# Patient Record
Sex: FEMALE | Race: WHITE | Employment: OTHER | URBAN - METROPOLITAN AREA
[De-identification: names, ages, dates, MRNs, and addresses within clinical notes are randomized per-mention and may not be internally consistent; named-entity substitution may affect disease eponyms.]

---

## 2017-03-14 ENCOUNTER — HOSPITAL ENCOUNTER (OUTPATIENT)
Dept: MRI IMAGING | Age: 67
Discharge: HOME | End: 2017-03-14
Attending: PODIATRIST
Payer: COMMERCIAL

## 2017-03-14 DIAGNOSIS — Z87.39 HISTORY OF OSTEOMYELITIS: ICD-10-CM

## 2017-03-14 LAB — CREAT BLD-MCNC: 0.6 MG/DL (ref 0.6–1)

## 2017-03-14 PROCEDURE — 74011250636 HC RX REV CODE- 250/636: Performed by: PODIATRIST

## 2017-03-14 PROCEDURE — 82565 ASSAY OF CREATININE: CPT

## 2017-03-14 PROCEDURE — A9577 INJ MULTIHANCE: HCPCS | Performed by: PODIATRIST

## 2017-03-14 PROCEDURE — 73720 MRI LWR EXTREMITY W/O&W/DYE: CPT

## 2017-03-14 RX ORDER — SODIUM CHLORIDE 0.9 % (FLUSH) 0.9 %
10 SYRINGE (ML) INJECTION
Status: ACTIVE | OUTPATIENT
Start: 2017-03-14 | End: 2017-03-15

## 2017-03-14 RX ADMIN — GADOBENATE DIMEGLUMINE 9 ML: 529 INJECTION, SOLUTION INTRAVENOUS at 16:12

## 2017-03-17 ENCOUNTER — PATIENT OUTREACH (OUTPATIENT)
Dept: OTHER | Age: 67
End: 2017-03-17

## 2017-03-17 NOTE — PROGRESS NOTES
Verified  and address for HIPAA security. Discussed [address] Management services with patient. Patient does not identify any care management needs at this time and declines services. Patient was notified that should she identify any needs in the future, to please contact us as we would be happy to work with her.

## 2017-06-21 ENCOUNTER — HOSPITAL ENCOUNTER (OUTPATIENT)
Dept: SURGERY | Age: 67
Discharge: HOME | End: 2017-06-21
Attending: OBSTETRICS & GYNECOLOGY
Payer: COMMERCIAL

## 2017-06-21 VITALS
DIASTOLIC BLOOD PRESSURE: 99 MMHG | BODY MASS INDEX: 35.73 KG/M2 | TEMPERATURE: 97.4 F | WEIGHT: 194.13 LBS | RESPIRATION RATE: 16 BRPM | HEART RATE: 64 BPM | SYSTOLIC BLOOD PRESSURE: 187 MMHG | OXYGEN SATURATION: 95 % | HEIGHT: 62 IN

## 2017-06-21 LAB
GLUCOSE BLD STRIP.AUTO-MCNC: 113 MG/DL (ref 65–100)
GLUCOSE SERPL-MCNC: 106 MG/DL (ref 65–100)
POTASSIUM SERPL-SCNC: 3.7 MMOL/L (ref 3.5–5.1)

## 2017-06-21 PROCEDURE — 84132 ASSAY OF SERUM POTASSIUM: CPT | Performed by: ANESTHESIOLOGY

## 2017-06-21 PROCEDURE — 82947 ASSAY GLUCOSE BLOOD QUANT: CPT | Performed by: ANESTHESIOLOGY

## 2017-06-21 PROCEDURE — 82962 GLUCOSE BLOOD TEST: CPT | Performed by: ANESTHESIOLOGY

## 2017-06-21 RX ORDER — THERA TABS 400 MCG
1 TAB ORAL DAILY
COMMUNITY
End: 2019-12-10 | Stop reason: HOSPADM

## 2017-06-21 RX ORDER — FUROSEMIDE 20 MG/1
20 TABLET ORAL DAILY
COMMUNITY
Start: 2016-03-30 | End: 2017-06-21

## 2017-06-21 RX ORDER — CITALOPRAM 20 MG/1
20 TABLET, FILM COATED ORAL
COMMUNITY
Start: 2016-04-20 | End: 2017-10-30 | Stop reason: SDUPTHER

## 2017-06-21 NOTE — PERIOP NOTES
Recent Results (from the past 8 hour(s))   POTASSIUM    Collection Time: 06/21/17  9:55 AM   Result Value Ref Range    Potassium 3.7 3.5 - 5.1 mmol/L   GLUCOSE, RANDOM    Collection Time: 06/21/17  9:55 AM   Result Value Ref Range    Glucose 106 (H) 65 - 100 mg/dL   GLUCOSE, POC    Collection Time: 06/21/17  9:57 AM   Result Value Ref Range    Glucose (POC) 113 (H) 65 - 100 mg/dL

## 2017-06-21 NOTE — PERIOP NOTES
Patient verified name, , and surgery as listed in [facility]. Type 1B surgery, walk in assessment complete. Labs per surgeon: none;   Labs per anesthesia protocol: potassium; results:  3.7; SQBS:  Results 113~within anesthesia guidelines; printed/placed on chart; routed to PCP, Dr. Chas Ceballos and to surgeon, Dr. Tavon Cast for further review. EKG: none needed per protocol    Hibiclens and instructions given per hospital policy. Patient provided with handouts including Guide to Surgery, Pain Management, Hand Hygiene, Blood Transfusion Education, and [place] [facility] Brochure. Patient answered medical/surgical history questions at their best of ability. All prior to admission medications documented in [facility]. Original medication prescription bottle NOT visualized during patient appointment. Patient instructed to hold all vitamins 7 days prior to surgery and NSAIDS 5 days prior to surgery, patient verbalized understanding. Medications to be held none    Patient instructed to continue previous medications as prescribed prior to surgery and to take the following medications the day of surgery according to anesthesia guidelines with a small sip of water: none. Patient taught back and verbalized understanding.

## 2017-06-22 ENCOUNTER — ANESTHESIA EVENT (OUTPATIENT)
Dept: SURGERY | Age: 67
End: 2017-06-22
Payer: COMMERCIAL

## 2017-06-22 RX ORDER — CELECOXIB 200 MG/1
200 CAPSULE ORAL
Status: CANCELLED | OUTPATIENT
Start: 2017-06-22

## 2017-06-22 RX ORDER — FENTANYL CITRATE 50 UG/ML
100 INJECTION, SOLUTION INTRAMUSCULAR; INTRAVENOUS ONCE
Status: CANCELLED | OUTPATIENT
Start: 2017-06-22 | End: 2017-06-22

## 2017-06-23 ENCOUNTER — HOSPITAL ENCOUNTER (OUTPATIENT)
Age: 67
Setting detail: OUTPATIENT SURGERY
Discharge: HOME | End: 2017-06-23
Attending: OBSTETRICS & GYNECOLOGY | Admitting: OBSTETRICS & GYNECOLOGY
Payer: COMMERCIAL

## 2017-06-23 ENCOUNTER — ANESTHESIA (OUTPATIENT)
Dept: SURGERY | Age: 67
End: 2017-06-23
Payer: COMMERCIAL

## 2017-06-23 VITALS
HEART RATE: 57 BPM | BODY MASS INDEX: 35.7 KG/M2 | RESPIRATION RATE: 15 BRPM | SYSTOLIC BLOOD PRESSURE: 124 MMHG | HEIGHT: 62 IN | OXYGEN SATURATION: 94 % | DIASTOLIC BLOOD PRESSURE: 57 MMHG | WEIGHT: 194 LBS | TEMPERATURE: 98 F

## 2017-06-23 LAB — GLUCOSE BLD STRIP.AUTO-MCNC: 164 MG/DL (ref 65–100)

## 2017-06-23 PROCEDURE — 74011250636 HC RX REV CODE- 250/636

## 2017-06-23 PROCEDURE — 77030032490 HC SLV COMPR SCD KNE COVD -B: Performed by: OBSTETRICS & GYNECOLOGY

## 2017-06-23 PROCEDURE — 88305 TISSUE EXAM BY PATHOLOGIST: CPT | Performed by: OBSTETRICS & GYNECOLOGY

## 2017-06-23 PROCEDURE — 76010000138 HC OR TIME 0.5 TO 1 HR: Performed by: OBSTETRICS & GYNECOLOGY

## 2017-06-23 PROCEDURE — 77030020143 HC AIRWY LARYN INTUB CGAS -A: Performed by: ANESTHESIOLOGY

## 2017-06-23 PROCEDURE — 82962 GLUCOSE BLOOD TEST: CPT

## 2017-06-23 PROCEDURE — 74011250636 HC RX REV CODE- 250/636: Performed by: ANESTHESIOLOGY

## 2017-06-23 PROCEDURE — 77030012317 HC CATH URET INT COVD -A: Performed by: OBSTETRICS & GYNECOLOGY

## 2017-06-23 PROCEDURE — 74011000250 HC RX REV CODE- 250: Performed by: ANESTHESIOLOGY

## 2017-06-23 PROCEDURE — 77030020782 HC GWN BAIR PAWS FLX 3M -B: Performed by: ANESTHESIOLOGY

## 2017-06-23 PROCEDURE — 76060000032 HC ANESTHESIA 0.5 TO 1 HR: Performed by: OBSTETRICS & GYNECOLOGY

## 2017-06-23 PROCEDURE — 77030018836 HC SOL IRR NACL ICUM -A: Performed by: OBSTETRICS & GYNECOLOGY

## 2017-06-23 PROCEDURE — 76210000020 HC REC RM PH II FIRST 0.5 HR: Performed by: OBSTETRICS & GYNECOLOGY

## 2017-06-23 PROCEDURE — 74011000250 HC RX REV CODE- 250

## 2017-06-23 PROCEDURE — 76210000016 HC OR PH I REC 1 TO 1.5 HR: Performed by: OBSTETRICS & GYNECOLOGY

## 2017-06-23 RX ORDER — HYDROMORPHONE HYDROCHLORIDE 2 MG/ML
0.5 INJECTION, SOLUTION INTRAMUSCULAR; INTRAVENOUS; SUBCUTANEOUS
Status: DISCONTINUED | OUTPATIENT
Start: 2017-06-23 | End: 2017-06-23 | Stop reason: HOSPADM

## 2017-06-23 RX ORDER — FENTANYL CITRATE 50 UG/ML
INJECTION, SOLUTION INTRAMUSCULAR; INTRAVENOUS AS NEEDED
Status: DISCONTINUED | OUTPATIENT
Start: 2017-06-23 | End: 2017-06-23 | Stop reason: HOSPADM

## 2017-06-23 RX ORDER — SODIUM CHLORIDE 0.9 % (FLUSH) 0.9 %
5-10 SYRINGE (ML) INJECTION AS NEEDED
Status: DISCONTINUED | OUTPATIENT
Start: 2017-06-23 | End: 2017-06-23 | Stop reason: HOSPADM

## 2017-06-23 RX ORDER — ONDANSETRON 2 MG/ML
INJECTION INTRAMUSCULAR; INTRAVENOUS AS NEEDED
Status: DISCONTINUED | OUTPATIENT
Start: 2017-06-23 | End: 2017-06-23 | Stop reason: HOSPADM

## 2017-06-23 RX ORDER — SODIUM CHLORIDE, SODIUM LACTATE, POTASSIUM CHLORIDE, CALCIUM CHLORIDE 600; 310; 30; 20 MG/100ML; MG/100ML; MG/100ML; MG/100ML
75 INJECTION, SOLUTION INTRAVENOUS CONTINUOUS
Status: DISCONTINUED | OUTPATIENT
Start: 2017-06-23 | End: 2017-06-23 | Stop reason: HOSPADM

## 2017-06-23 RX ORDER — LIDOCAINE HYDROCHLORIDE 10 MG/ML
0.1 INJECTION INFILTRATION; PERINEURAL AS NEEDED
Status: DISCONTINUED | OUTPATIENT
Start: 2017-06-23 | End: 2017-06-23 | Stop reason: HOSPADM

## 2017-06-23 RX ORDER — SODIUM CHLORIDE, SODIUM LACTATE, POTASSIUM CHLORIDE, CALCIUM CHLORIDE 600; 310; 30; 20 MG/100ML; MG/100ML; MG/100ML; MG/100ML
50 INJECTION, SOLUTION INTRAVENOUS CONTINUOUS
Status: DISCONTINUED | OUTPATIENT
Start: 2017-06-23 | End: 2017-06-23 | Stop reason: HOSPADM

## 2017-06-23 RX ORDER — KETOROLAC TROMETHAMINE 30 MG/ML
INJECTION, SOLUTION INTRAMUSCULAR; INTRAVENOUS AS NEEDED
Status: DISCONTINUED | OUTPATIENT
Start: 2017-06-23 | End: 2017-06-23 | Stop reason: HOSPADM

## 2017-06-23 RX ORDER — ALBUTEROL SULFATE 0.83 MG/ML
2.5 SOLUTION RESPIRATORY (INHALATION) AS NEEDED
Status: DISCONTINUED | OUTPATIENT
Start: 2017-06-23 | End: 2017-06-23 | Stop reason: HOSPADM

## 2017-06-23 RX ORDER — MIDAZOLAM HYDROCHLORIDE 1 MG/ML
2 INJECTION, SOLUTION INTRAMUSCULAR; INTRAVENOUS ONCE
Status: DISCONTINUED | OUTPATIENT
Start: 2017-06-23 | End: 2017-06-23 | Stop reason: HOSPADM

## 2017-06-23 RX ORDER — PROPOFOL 10 MG/ML
INJECTION, EMULSION INTRAVENOUS AS NEEDED
Status: DISCONTINUED | OUTPATIENT
Start: 2017-06-23 | End: 2017-06-23 | Stop reason: HOSPADM

## 2017-06-23 RX ORDER — MIDAZOLAM HYDROCHLORIDE 1 MG/ML
2 INJECTION, SOLUTION INTRAMUSCULAR; INTRAVENOUS
Status: DISCONTINUED | OUTPATIENT
Start: 2017-06-23 | End: 2017-06-23 | Stop reason: HOSPADM

## 2017-06-23 RX ORDER — DEXAMETHASONE SODIUM PHOSPHATE 4 MG/ML
INJECTION, SOLUTION INTRA-ARTICULAR; INTRALESIONAL; INTRAMUSCULAR; INTRAVENOUS; SOFT TISSUE AS NEEDED
Status: DISCONTINUED | OUTPATIENT
Start: 2017-06-23 | End: 2017-06-23 | Stop reason: HOSPADM

## 2017-06-23 RX ORDER — LIDOCAINE HYDROCHLORIDE 20 MG/ML
INJECTION, SOLUTION EPIDURAL; INFILTRATION; INTRACAUDAL; PERINEURAL AS NEEDED
Status: DISCONTINUED | OUTPATIENT
Start: 2017-06-23 | End: 2017-06-23 | Stop reason: HOSPADM

## 2017-06-23 RX ORDER — SODIUM CHLORIDE 0.9 % (FLUSH) 0.9 %
5-10 SYRINGE (ML) INJECTION EVERY 8 HOURS
Status: DISCONTINUED | OUTPATIENT
Start: 2017-06-23 | End: 2017-06-23 | Stop reason: HOSPADM

## 2017-06-23 RX ORDER — OXYCODONE HYDROCHLORIDE 5 MG/1
5 TABLET ORAL
Status: DISCONTINUED | OUTPATIENT
Start: 2017-06-23 | End: 2017-06-23 | Stop reason: HOSPADM

## 2017-06-23 RX ORDER — TRAMADOL HYDROCHLORIDE 50 MG/1
50 TABLET ORAL
Qty: 20 TAB | Refills: 0 | Status: SHIPPED | OUTPATIENT
Start: 2017-06-23 | End: 2017-12-13

## 2017-06-23 RX ADMIN — DEXAMETHASONE SODIUM PHOSPHATE 10 MG: 4 INJECTION, SOLUTION INTRA-ARTICULAR; INTRALESIONAL; INTRAMUSCULAR; INTRAVENOUS; SOFT TISSUE at 11:06

## 2017-06-23 RX ADMIN — FENTANYL CITRATE 25 MCG: 50 INJECTION, SOLUTION INTRAMUSCULAR; INTRAVENOUS at 11:00

## 2017-06-23 RX ADMIN — SODIUM CHLORIDE, SODIUM LACTATE, POTASSIUM CHLORIDE, AND CALCIUM CHLORIDE: 600; 310; 30; 20 INJECTION, SOLUTION INTRAVENOUS at 10:55

## 2017-06-23 RX ADMIN — FENTANYL CITRATE 50 MCG: 50 INJECTION, SOLUTION INTRAMUSCULAR; INTRAVENOUS at 10:56

## 2017-06-23 RX ADMIN — KETOROLAC TROMETHAMINE 30 MG: 30 INJECTION, SOLUTION INTRAMUSCULAR; INTRAVENOUS at 11:24

## 2017-06-23 RX ADMIN — SODIUM CHLORIDE, SODIUM LACTATE, POTASSIUM CHLORIDE, AND CALCIUM CHLORIDE: 600; 310; 30; 20 INJECTION, SOLUTION INTRAVENOUS at 11:24

## 2017-06-23 RX ADMIN — PROPOFOL 200 MG: 10 INJECTION, EMULSION INTRAVENOUS at 10:56

## 2017-06-23 RX ADMIN — SODIUM CHLORIDE, SODIUM LACTATE, POTASSIUM CHLORIDE, AND CALCIUM CHLORIDE 75 ML/HR: 600; 310; 30; 20 INJECTION, SOLUTION INTRAVENOUS at 10:39

## 2017-06-23 RX ADMIN — LIDOCAINE HYDROCHLORIDE 0.1 ML: 10 INJECTION, SOLUTION INFILTRATION; PERINEURAL at 10:39

## 2017-06-23 RX ADMIN — FENTANYL CITRATE 25 MCG: 50 INJECTION, SOLUTION INTRAMUSCULAR; INTRAVENOUS at 11:10

## 2017-06-23 RX ADMIN — LIDOCAINE HYDROCHLORIDE 100 MG: 20 INJECTION, SOLUTION EPIDURAL; INFILTRATION; INTRACAUDAL; PERINEURAL at 10:56

## 2017-06-23 RX ADMIN — ONDANSETRON 4 MG: 2 INJECTION INTRAMUSCULAR; INTRAVENOUS at 11:06

## 2017-06-23 NOTE — OP NOTES
HYSTEROSCOPY WITH DILATATION AND CURETTAGE    DATE OF PROCEDURE: 6/23/2017     PREOPERATIVE DIAGNOSIS: Post-menopausal bleeding [N95.0]  Thickened endometrium [R93.8]     POSTOPERATWE DIAGNOSIS: Post-menopausal bleeding [N95.0]  Thickened endometrium [R93.8] endometrIA POLYPS    PROCEDURE: Hysteroscopy with dilatation & curettage. SURGEON: Ilia Wilkerson MD    ANESTHESIA: General.    DRAINS: Sterile in and out catheterization of the bladder. FINDINGS: MULTIPLE LARGE POLYPS    PROCEDURE IN DETAIL: The patient was taken to the Operating Room. After adequate anesthesia was established she was properly positioned, scrubbed, prepped and draped. Time out was done to confirm the operating procedure, surgeon, patient and site. Once confirmed by the team, procedure was started. The weighted speculum was placed in the vault to expose the cervix, was grasped at 12 oclock with the single-tooth tenaculum and sounded to 8 cm. It was sequentially dilated prior to the hysteroscope being inserted with the above noted findings. Multiple large polyps excised with forceps. Afterwards a very gentle but homogenous curetting was done starting in the midline and working in a clockwise manner. Endometrial tissue was retrieved. The hysteroscope was reinserted again. With this complete and thorough visual review, we terminated the procedure. With the sponge, instrument and needle count correct, the patient was awakened and taken to the Recovery Room in satisfactory condition. BLOOD LOSS ESTIMATED: Minimal    SPECIMENS:Endometrial curettings AND POLYPS    Pt discharged to home . Precautions given . Appt 2 weeks.  Rx for Tramadol  50 mg (#20)

## 2017-06-23 NOTE — ANESTHESIA POSTPROCEDURE EVALUATION
Post-Anesthesia Evaluation and Assessment    Patient: Kristan Chew MRN: [id number]  SSN: [id number]    YOB: 1950  Age: 78 y.o. Sex: female       Cardiovascular Function/Vital Signs  Visit Vitals    /82    Pulse 60    Temp 36.7 °C (98 °F)    Resp 16    Ht 5' 2\" (1.575 m)    Wt 88 kg (194 lb)    SpO2 93%    BMI 35.48 kg/m2       Patient is status post general anesthesia for Procedure(s):  DILATATION AND CURETTAGE HYSTEROSCOPY, POLYPECTOMY. Nausea/Vomiting: None    Postoperative hydration reviewed and adequate. Pain:  Pain Scale 1: Numeric (0 - 10) (06/23/17 1218)  Pain Intensity 1: 0 (06/23/17 1218)   Managed    Neurological Status:   Neuro (WDL): Exceptions to WDL (06/23/17 1218)  Neuro  Neurologic State: Drowsy (06/23/17 1218)  Orientation Level: Oriented to person;Oriented to place;Oriented to situation (06/23/17 1132)  Cognition: Follows commands (06/23/17 1218)  LUE Motor Response: Purposeful (06/23/17 1218)  LLE Motor Response: Purposeful (06/23/17 1218)  RUE Motor Response: Purposeful (06/23/17 1218)  RLE Motor Response: Purposeful (06/23/17 1218)   At baseline    Mental Status and Level of Consciousness: Arousable    Pulmonary Status:   O2 Device: Room air (06/23/17 1218)   Adequate oxygenation and airway patent    Complications related to anesthesia: None    Post-anesthesia assessment completed.  No concerns    Signed By: Deshawn Garcia MD     June 23, 2017

## 2017-06-23 NOTE — DISCHARGE INSTRUCTIONS
INSTRUCTIONS FOLLOWING HYSTEROSCOPY    ACTIVITY   Limited activity today; increase as tolerated tomorrow, but no vigorous exercise   Shower only; no tub baths   No douches, tampons or intercourse until your doctor releases you (at least 2 weeks)   May return to work or school as directed by your doctor    DIET   Clear liquids until no nausea or vomiting   Advance to regular diet as tolerated    PAIN   Expect a moderate amount of pain.  Take pain medication as directed by your doctor. If no prescription for pain is sent         home with you, take the appropriate dose of your commonly used pain medication.  Call your doctor if pain is NOT relieved by medication.  DO NOT take aspirin or blood thinners until directed by your doctor. FOLLOW-UP PHONE [address] will be made by nursing staff   If you have any problems, call your doctor as needed. CALL YOUR DOCTOR IF   Excessive bleeding that soaks a pad in an hour   Temperature of 101°F or above   Green or yellow, smelly discharge   Unable to urinate by bedtime   Nausea and vomiting that does not stop by bedtime    AFTER ANESTHESIA   For the first 24 hours: DO NOT Drive, Drink alcoholic beverages, or Make important decisions.  Beware of dizziness following anesthesia and while taking pain medication.    Plan to stay tonight within 1 hours drive of the hospital

## 2017-06-23 NOTE — ANESTHESIA PREPROCEDURE EVALUATION
Anesthetic History   No history of anesthetic complications            Review of Systems / Medical History  Patient summary reviewed, nursing notes reviewed and pertinent labs reviewed    Pulmonary  Within defined limits                 Neuro/Psych   Within defined limits           Cardiovascular    Hypertension: well controlled        Dysrhythmias       Exercise tolerance: >4 METS     GI/Hepatic/Renal     GERD: well controlled           Endo/Other    Diabetes: well controlled, type 2    Obesity and arthritis     Other Findings              Physical Exam    Airway  Mallampati: II  TM Distance: 4 - 6 cm  Neck ROM: normal range of motion   Mouth opening: Normal     Cardiovascular    Rhythm: regular           Dental  No notable dental hx       Pulmonary  Breath sounds clear to auscultation               Abdominal         Other Findings            Anesthetic Plan    ASA: 3  Anesthesia type: general          Induction: Intravenous  Anesthetic plan and risks discussed with: Patient

## 2017-06-23 NOTE — H&P
Subjective:     Patient is a 78 y.o.  female presents with postmenopausal bleeding. gradually worsening course. Patient Active Problem List    Diagnosis Date Noted    Cellulitis in diabetic foot ([facility] 2011    Severe sepsis ([facility] 2011    Diabetes ([facility] 2011    Hypotension, unspecified 2011    HTN (hypertension) 2011    Depression 2011     Past Medical History:   Diagnosis Date    Arrhythmia     episode A. Fib with tachycardia 2014 (on a cruise) Converted back to normal sinus rhythm with Amiodarone    Arthritis     in hands    Cellulitis     history of cellulitis right foot, leg (hosp. 11) and 2nd episode in 2014 (not hospitalized);turned into ulcer on toe     Depression     Diabetes ([facility] dx     type 2, oral med, avg fbs 130-170, notices s/s hypoglycemia at 60, unsure last HA1C    Diverticulosis of colon (without mention of hemorrhage)     Edema extremities     chronic (treated with daily Furosemide)    Foot ulcer (HCC)     GERD (gastroesophageal reflux disease)     occ. episode with tomato-type products, relieved with rolaids or tums    Hammer toe     Hypercholesterolemia     no medication    Hypertension     controlled with meds    Neuropathy     in bilateral feet; no medication    Obese 14    BMI 39.1    PUD (peptic ulcer disease)     Thromboembolus ([facility]     right leg      Past Surgical History:   Procedure Laterality Date    ABDOMEN SURGERY PROC UNLISTED  2011    drainage abdominal wall abscess    HX APPENDECTOMY      HX CARPAL TUNNEL RELEASE Right     HX  SECTION      HX LAP CHOLECYSTECTOMY      HX OOPHORECTOMY  early 's    Rt ovary removed.  Left ovary intact    HX ORTHOPAEDIC      left foot~pt denies    HX OTHER SURGICAL      abscess drained~pt states no    HX TUBAL LIGATION        [unfilled]  Allergies   Allergen Reactions    Amoxicillin Rash and Other (comments)     Made her feel \"weird\"  Doxycycline Nausea and Vomiting      Social History   Substance Use Topics    Smoking status: Never Smoker    Smokeless tobacco: Never Used    Alcohol use No      Family History   Problem Relation Age of Onset    Colon Cancer Mother     Cancer Father      lung (smoker)    Hypertension Brother     Heart Disease Brother     Heart Attack Brother     Stroke Brother     Cancer Other      mother - colon      Review of Systems  A comprehensive review of systems was negative except for that written in the HPI. Objective:     Patient Vitals for the past 8 hrs:   BP Temp Pulse Resp SpO2 Height Weight   06/23/17 1012 184/85 - - - - - -   06/23/17 1008 - 97.6 °F (36.4 °C) 68 18 95 % 5' 2\" (1.575 m) 194 lb (88 kg)     No intake or output data in the 24 hours ending 06/23/17 1040      General: Alert . Oriented x3   HEENT: Normocephalic PEERL   Heart: RRR   Lungs: Clear   Abdominal: Bowel sounds are normal, liver is not enlarged, spleen is not enlarged   Neurological: Alert and oriented X 3, normal strength and tone. Normal symmetric reflexes.  Normal coordination and gait   Extremities: extremities normal, atraumatic, no cyanosis or edema     Assessment:     Postmenopausal bleeding    Plan:       Procedure(s):  DILATATION AND CURETTAGE HYSTEROSCOPY

## 2017-06-23 NOTE — IP AVS SNAPSHOT
[place] [facility] 
 
 
 [address] 
[phone] Patient: Huang Lake MRN: [id number] :1950 You are allergic to the following Allergen Reactions Amoxicillin Rash Other (comments) Made her feel \"weird\" Doxycycline Nausea and Vomiting Recent Documentation Height Weight BMI OB Status Smoking Status 1.575 m 88 kg 35.48 kg/m2 Menopause Never Smoker Emergency Contacts Name Discharge Info Relation Home Work Mobile Richard Meehan DISCHARGE CAREGIVER [3] Spouse [3] [phone] About your hospitalization You were admitted on:  2017 You last received care in the:  [facility] PACU You were discharged on:  2017 Unit phone number:  [phone] Why you were hospitalized Your primary diagnosis was:  Not on File Providers Seen During Your Hospitalizations Provider Role Specialty Primary office phone Zen Lipscomb MD Attending Provider Obstetrics & Gynecology [phone] Your Primary Care Physician (PCP) Primary Care Physician Office Phone Office Fax [facility] [address] [facility] [phone] Follow-up Information Follow up With Details Comments Contact Info Zen Lipscomb MD  Please call for follow-up appointment. [address] [employer] [address] [place] [address] 
[phone] Mehrdad Salcedo MD   [address] [place] [address] 
[phone] Your Appointments 2017  2:30 PM EDT  
POST OP with Zen Lipscomb MD  
[facility] ([address] [facility]) [facility] [address] [phone] Current Discharge Medication List  
  
START taking these medications Dose & Instructions Dispensing Information Comments Morning Noon Evening Bedtime traMADol 50 mg tablet Commonly known as:  ULTRAM  
   
Your last dose was: Your next dose is:    
   
   
 Dose:  50 mg Take 1 Tab by mouth every six (6) hours as needed for Pain. Max Daily Amount: 200 mg. Quantity:  20 Tab Refills:  0 CONTINUE these medications which have NOT CHANGED Dose & Instructions Dispensing Information Comments Morning Noon Evening Bedtime  
 b complex vitamins tablet Your last dose was: Your next dose is:    
   
   
 Dose:  1 Tab Take 1 Tab by mouth daily. Refills:  0  
     
   
   
   
  
 benazepril 20 mg tablet Commonly known as:  LOTENSIN Your last dose was: Your next dose is:    
   
   
 Dose:  20 mg Take 1 Tab by mouth daily. Quantity:  90 Tab Refills:  3 cholecalciferol (VITAMIN D3) 5,000 unit Tab tablet Commonly known as:  VITAMIN D3 Your last dose was: Your next dose is:    
   
   
 Dose:  5000 Units Take 5,000 Units by mouth every morning. Refills:  0  
     
   
   
   
  
 citalopram 20 mg tablet Commonly known as:  Uyen Garcia Your last dose was: Your next dose is:    
   
   
 Dose:  20 mg Take 20 mg by mouth nightly. Takes 1/2 tablet Refills:  0  
     
   
   
   
  
 CO Q-10 10 mg Cap Generic drug:  coenzyme q10 Your last dose was: Your next dose is: Take  by mouth daily. WITH CINNAMIN Refills:  0  
     
   
   
   
  
 furosemide 20 mg tablet Commonly known as:  LASIX Your last dose was: Your next dose is:    
   
   
 Dose:  20 mg Take 1 Tab by mouth daily. Quantity:  90 Tab Refills:  3  
     
   
   
   
  
 glucose blood VI test strips strip Commonly known as:  CONTOUR NEXT STRIPS Your last dose was: Your next dose is:    
   
   
 Test blood sugars once daily Quantity:  50 Strip Refills:  11  
     
   
   
   
  
 glyBURIDE-metFORMIN 5-500 mg per tablet Commonly known as:  Kd Mccarthy Your last dose was: Your next dose is: TAKE 1 TABLET ORAL TWO TIMES DAILY Quantity:  180 Tab Refills:  3 OMEGA 3-6-9 COMPLEX PO Your last dose was: Your next dose is: Take  by mouth daily. Refills:  0 PROBIOTIC 4X 10-15 mg Tbec Generic drug:  B.infantis-B.ani-B.long-B.bifi Your last dose was: Your next dose is: Take  by mouth daily. Refills:  0  
     
   
   
   
  
 therapeutic multivitamin tablet Commonly known as:  [facility] Your last dose was: Your next dose is:    
   
   
 Dose:  1 Tab Take 1 Tab by mouth daily. Refills:  0  
     
   
   
   
  
 VITAMIN C PO Your last dose was: Your next dose is:    
   
   
 Dose:  1 Tab Take 1 Tab by mouth every morning. Refills:  0 Where to Get Your Medications Information on where to get these meds will be given to you by the nurse or doctor. ! Ask your nurse or doctor about these medications  
  traMADol 50 mg tablet Discharge Instructions INSTRUCTIONS FOLLOWING HYSTEROSCOPY 
 
ACTIVITY  Limited activity today; increase as tolerated tomorrow, but no vigorous exercise  Shower only; no tub baths  No douches, tampons or intercourse until your doctor releases you (at least 2 weeks)  May return to work or school as directed by your doctor DIET  Clear liquids until no nausea or vomiting  Advance to regular diet as tolerated PAIN 
 Expect a moderate amount of pain.  Take pain medication as directed by your doctor. If no prescription for pain is sent     
   home with you, take the appropriate dose of your commonly used pain medication.  Call your doctor if pain is NOT relieved by medication.  DO NOT take aspirin or blood thinners until directed by your doctor. FOLLOW-UP PHONE CALLS  Calls will be made by nursing staff  If you have any problems, call your doctor as needed. CALL YOUR DOCTOR IF 
 Excessive bleeding that soaks a pad in an hour  Temperature of 101°F or above  Green or yellow, smelly discharge  Unable to urinate by bedtime  Nausea and vomiting that does not stop by bedtime AFTER ANESTHESIA  For the first 24 hours: DO NOT Drive, Drink alcoholic beverages, or Make important decisions.  Beware of dizziness following anesthesia and while taking pain medication.  Plan to stay tonight within 1 hours drive of the hospital 
 
 
 
 
Discharge Orders None ACO Transitions of Care Introducing [employer] [address]qui Mayen offers a voluntary care coordination program to provide high quality service and care to [facility] fee-for-service beneficiaries. Renée Rodriguez was designed to help you enhance your health and well-being through the following services: ? Transitions of Care  support for individuals who are transitioning from one care setting to another (example: Hospital to home). ? Chronic and Complex Care Coordination  support for individuals and caregivers of those with serious or chronic illnesses or with more than one chronic (ongoing) condition and those who take a number of different medications. If you meet specific medical criteria, a [facility] [address] may call you directly to coordinate your care with your primary care physician and your other care providers. For questions about the [facility] programs, please, contact your physicians office. For general questions or additional information about Accountable Care Organizations: 
Please visit www.medicare.gov/acos. html or call 1-800-MEDICARE (7-716.922.3456) TTY users should call 6-258.365.1705. Introducing [facility]! Jennifer Argueta Nicely: Thank you for requesting a [employer] account. Our records indicate that you already have an active [employer] account. You can access your account anytime at [email or web address] Did you know that you can access your hospital and ER discharge instructions at any time in [employer]? You can also review all of your test results from your hospital stay or ER visit. Additional Information If you have questions, please visit the Frequently Asked Questions section of the [employer] website at [email or web address] Remember, [employer] is NOT to be used for urgent needs. For medical emergencies, dial 911. Now available from your iPhone and Android! General Information Please provide this summary of care documentation to your next provider. Patient Signature:  ____________________________________________________________ Date:  ____________________________________________________________  
  
[place] Has Provider Signature:  ____________________________________________________________ Date:  ____________________________________________________________

## 2017-08-10 ENCOUNTER — APPOINTMENT (OUTPATIENT)
Dept: WOUND CARE | Age: 67
End: 2017-08-10
Attending: SURGERY

## 2017-11-02 ENCOUNTER — HOSPITAL ENCOUNTER (OUTPATIENT)
Dept: MAMMOGRAPHY | Age: 67
Discharge: HOME | End: 2017-11-02
Attending: INTERNAL MEDICINE
Payer: COMMERCIAL

## 2017-11-02 DIAGNOSIS — Z12.31 VISIT FOR SCREENING MAMMOGRAM: ICD-10-CM

## 2017-11-02 PROCEDURE — 77067 SCR MAMMO BI INCL CAD: CPT

## 2019-02-03 ENCOUNTER — HOSPITAL ENCOUNTER (OUTPATIENT)
Age: 69
Setting detail: OBSERVATION
Discharge: HOME | End: 2019-02-03
Attending: EMERGENCY MEDICINE | Admitting: INTERNAL MEDICINE
Payer: COMMERCIAL

## 2019-02-03 ENCOUNTER — APPOINTMENT (OUTPATIENT)
Dept: GENERAL RADIOLOGY | Age: 69
End: 2019-02-03
Attending: EMERGENCY MEDICINE
Payer: COMMERCIAL

## 2019-02-03 VITALS
RESPIRATION RATE: 18 BRPM | OXYGEN SATURATION: 96 % | TEMPERATURE: 98 F | WEIGHT: 195 LBS | DIASTOLIC BLOOD PRESSURE: 60 MMHG | HEIGHT: 62 IN | HEART RATE: 66 BPM | BODY MASS INDEX: 35.88 KG/M2 | SYSTOLIC BLOOD PRESSURE: 123 MMHG

## 2019-02-03 DIAGNOSIS — E11.9 TYPE 2 DIABETES MELLITUS WITHOUT COMPLICATION, WITHOUT LONG-TERM CURRENT USE OF INSULIN (HCC): ICD-10-CM

## 2019-02-03 DIAGNOSIS — I48.91 ATRIAL FIBRILLATION WITH RAPID VENTRICULAR RESPONSE (HCC): Primary | ICD-10-CM

## 2019-02-03 DIAGNOSIS — I10 ESSENTIAL HYPERTENSION: ICD-10-CM

## 2019-02-03 LAB
ALBUMIN SERPL-MCNC: 3.8 G/DL (ref 3.2–4.6)
ALBUMIN/GLOB SERPL: 0.9 {RATIO} (ref 1.2–3.5)
ALP SERPL-CCNC: 103 U/L (ref 50–136)
ALT SERPL-CCNC: 30 U/L (ref 12–65)
ANION GAP SERPL CALC-SCNC: 10 MMOL/L (ref 7–16)
AST SERPL-CCNC: 15 U/L (ref 15–37)
ATRIAL RATE: 127 BPM
BASOPHILS # BLD: 0.1 K/UL (ref 0–0.2)
BASOPHILS NFR BLD: 0 % (ref 0–2)
BILIRUB SERPL-MCNC: 0.6 MG/DL (ref 0.2–1.1)
BUN SERPL-MCNC: 16 MG/DL (ref 8–23)
CALCIUM SERPL-MCNC: 10.5 MG/DL (ref 8.3–10.4)
CALCULATED R AXIS, ECG10: 6 DEGREES
CALCULATED T AXIS, ECG11: 37 DEGREES
CHLORIDE SERPL-SCNC: 100 MMOL/L (ref 98–107)
CO2 SERPL-SCNC: 28 MMOL/L (ref 21–32)
CREAT SERPL-MCNC: 0.72 MG/DL (ref 0.6–1)
DIAGNOSIS, 93000: NORMAL
DIFFERENTIAL METHOD BLD: ABNORMAL
EOSINOPHIL # BLD: 0.1 K/UL (ref 0–0.8)
EOSINOPHIL NFR BLD: 1 % (ref 0.5–7.8)
ERYTHROCYTE [DISTWIDTH] IN BLOOD BY AUTOMATED COUNT: 12.9 % (ref 11.9–14.6)
GLOBULIN SER CALC-MCNC: 4.4 G/DL (ref 2.3–3.5)
GLUCOSE SERPL-MCNC: 160 MG/DL (ref 65–100)
HCT VFR BLD AUTO: 48.5 % (ref 35.8–46.3)
HGB BLD-MCNC: 16.4 G/DL (ref 11.7–15.4)
IMM GRANULOCYTES # BLD AUTO: 0.1 K/UL (ref 0–0.5)
IMM GRANULOCYTES NFR BLD AUTO: 0 % (ref 0–5)
LYMPHOCYTES # BLD: 4.7 K/UL (ref 0.5–4.6)
LYMPHOCYTES NFR BLD: 42 % (ref 13–44)
MCH RBC QN AUTO: 30.8 PG (ref 26.1–32.9)
MCHC RBC AUTO-ENTMCNC: 33.8 G/DL (ref 31.4–35)
MCV RBC AUTO: 91.2 FL (ref 79.6–97.8)
MONOCYTES # BLD: 0.8 K/UL (ref 0.1–1.3)
MONOCYTES NFR BLD: 7 % (ref 4–12)
NEUTS SEG # BLD: 5.5 K/UL (ref 1.7–8.2)
NEUTS SEG NFR BLD: 49 % (ref 43–78)
NRBC # BLD: 0 K/UL (ref 0–0.2)
PLATELET # BLD AUTO: 226 K/UL (ref 150–450)
PMV BLD AUTO: 9.8 FL (ref 9.4–12.3)
POTASSIUM SERPL-SCNC: 3.5 MMOL/L (ref 3.5–5.1)
PROT SERPL-MCNC: 8.2 G/DL (ref 6.3–8.2)
Q-T INTERVAL, ECG07: 282 MS
QRS DURATION, ECG06: 84 MS
QTC CALCULATION (BEZET), ECG08: 478 MS
RBC # BLD AUTO: 5.32 M/UL (ref 4.05–5.2)
SODIUM SERPL-SCNC: 138 MMOL/L (ref 136–145)
TROPONIN I BLD-MCNC: 0 NG/ML (ref 0.02–0.05)
VENTRICULAR RATE, ECG03: 173 BPM
WBC # BLD AUTO: 11.2 K/UL (ref 4.3–11.1)

## 2019-02-03 PROCEDURE — 96376 TX/PRO/DX INJ SAME DRUG ADON: CPT

## 2019-02-03 PROCEDURE — 85025 COMPLETE CBC W/AUTO DIFF WBC: CPT

## 2019-02-03 PROCEDURE — 93005 ELECTROCARDIOGRAM TRACING: CPT | Performed by: EMERGENCY MEDICINE

## 2019-02-03 PROCEDURE — 71045 X-RAY EXAM CHEST 1 VIEW: CPT

## 2019-02-03 PROCEDURE — 96365 THER/PROPH/DIAG IV INF INIT: CPT | Performed by: EMERGENCY MEDICINE

## 2019-02-03 PROCEDURE — 80053 COMPREHEN METABOLIC PANEL: CPT

## 2019-02-03 PROCEDURE — 74011000250 HC RX REV CODE- 250: Performed by: EMERGENCY MEDICINE

## 2019-02-03 PROCEDURE — 96375 TX/PRO/DX INJ NEW DRUG ADDON: CPT | Performed by: EMERGENCY MEDICINE

## 2019-02-03 PROCEDURE — 99218 HC RM OBSERVATION: CPT

## 2019-02-03 PROCEDURE — 99285 EMERGENCY DEPT VISIT HI MDM: CPT | Performed by: EMERGENCY MEDICINE

## 2019-02-03 PROCEDURE — 74011000258 HC RX REV CODE- 258: Performed by: EMERGENCY MEDICINE

## 2019-02-03 PROCEDURE — 96366 THER/PROPH/DIAG IV INF ADDON: CPT | Performed by: EMERGENCY MEDICINE

## 2019-02-03 PROCEDURE — 84484 ASSAY OF TROPONIN QUANT: CPT

## 2019-02-03 RX ORDER — FLECAINIDE ACETATE 50 MG/1
50 TABLET ORAL 2 TIMES DAILY
Qty: 60 TAB | Refills: 2 | Status: SHIPPED | OUTPATIENT
Start: 2019-02-03 | End: 2019-03-05

## 2019-02-03 RX ORDER — DILTIAZEM HYDROCHLORIDE 5 MG/ML
20 INJECTION INTRAVENOUS
Status: COMPLETED | OUTPATIENT
Start: 2019-02-03 | End: 2019-02-03

## 2019-02-03 RX ORDER — DILTIAZEM HYDROCHLORIDE 5 MG/ML
INJECTION INTRAVENOUS
Status: DISCONTINUED
Start: 2019-02-03 | End: 2019-02-03 | Stop reason: HOSPADM

## 2019-02-03 RX ORDER — LISINOPRIL 20 MG/1
20 TABLET ORAL DAILY
Status: CANCELLED | OUTPATIENT
Start: 2019-02-04

## 2019-02-03 RX ORDER — METOPROLOL TARTRATE 25 MG/1
25 TABLET, FILM COATED ORAL 2 TIMES DAILY
Qty: 60 TAB | Refills: 2 | Status: SHIPPED | OUTPATIENT
Start: 2019-02-03 | End: 2019-02-05

## 2019-02-03 RX ORDER — INSULIN LISPRO 100 [IU]/ML
INJECTION, SOLUTION INTRAVENOUS; SUBCUTANEOUS
Status: DISCONTINUED | OUTPATIENT
Start: 2019-02-03 | End: 2019-02-03 | Stop reason: HOSPADM

## 2019-02-03 RX ORDER — SODIUM CHLORIDE 0.9 % (FLUSH) 0.9 %
5-40 SYRINGE (ML) INJECTION EVERY 8 HOURS
Status: DISCONTINUED | OUTPATIENT
Start: 2019-02-03 | End: 2019-02-03 | Stop reason: HOSPADM

## 2019-02-03 RX ORDER — METOPROLOL TARTRATE 5 MG/5ML
INJECTION INTRAVENOUS
Status: DISCONTINUED
Start: 2019-02-03 | End: 2019-02-03 | Stop reason: HOSPADM

## 2019-02-03 RX ORDER — METOPROLOL TARTRATE 25 MG/1
25 TABLET, FILM COATED ORAL 2 TIMES DAILY
Qty: 60 TAB | Refills: 2 | Status: SHIPPED | OUTPATIENT
Start: 2019-02-03 | End: 2019-03-05

## 2019-02-03 RX ORDER — SODIUM CHLORIDE 0.9 % (FLUSH) 0.9 %
5-40 SYRINGE (ML) INJECTION AS NEEDED
Status: DISCONTINUED | OUTPATIENT
Start: 2019-02-03 | End: 2019-02-03 | Stop reason: HOSPADM

## 2019-02-03 RX ORDER — METOPROLOL TARTRATE 5 MG/5ML
5 INJECTION INTRAVENOUS
Status: ACTIVE | OUTPATIENT
Start: 2019-02-03 | End: 2019-02-03

## 2019-02-03 RX ADMIN — DILTIAZEM HYDROCHLORIDE 20 MG: 5 INJECTION INTRAVENOUS at 13:04

## 2019-02-03 RX ADMIN — METOPROLOL TARTRATE 5 MG: 1 INJECTION, SOLUTION INTRAVENOUS at 13:05

## 2019-02-03 RX ADMIN — DILTIAZEM HYDROCHLORIDE 5 MG/HR: 5 INJECTION, SOLUTION INTRAVENOUS at 13:33

## 2019-02-03 NOTE — ED TRIAGE NOTES
Pt to triage with . EKG being completed at this time. Pt states she feel like her heart is out of rhythm and is feeling palpitations, but denies cp. Pt reports SOB. Pt this started after [place] a bit ago. Pt has a hx of atrial flutter. Pt states she is not taking any medications for the atrial flutter and denies taking any anticoagulants.

## 2019-02-03 NOTE — ED PROVIDER NOTES
Patient has a history of paroxysmal atrial fibrillation that has been going on for several years. She is currently not taking any medication for it, states it happens very sporadically. Around noon today, she had the sudden onset of palpitations and the feeling of her heart racing. She denies any chest pain, states that this has been the pattern in the past.  Her symptoms persisted so she came here for evaluation. She presented to triage with a heart rate in the 180s. Elements of this note were created using speech recognition software. As such, errors of speech recognition may be present. Past Medical History:  
Diagnosis Date  Arrhythmia   
 episode A. Fib with tachycardia 1/2014 (on a cruise) Converted back to normal sinus rhythm with Amiodarone  Arthritis   
 in hands  Cellulitis   
 history of cellulitis right foot, leg (hosp. 8/19/11) and 2nd episode in 1/2014 (not hospitalized);turned into ulcer on toe  Depression  Diabetes ([address] dx 2010  
 type 2, oral med, avg fbs 130-170, notices s/s hypoglycemia at 60, unsure last HA1C  
 Diverticulosis of colon (without mention of hemorrhage)  Edema extremities   
 chronic (treated with daily Furosemide)  Foot ulcer ([address]  GERD (gastroesophageal reflux disease)   
 occ. episode with tomato-type products, relieved with rolaids or tums  Hammer toe  Hypercholesterolemia   
 no medication  Hypertension   
 controlled with meds  Neuropathy   
 in bilateral feet; no medication  Obese 2/18/14 BMI 39.1  PUD (peptic ulcer disease) 1971  Thromboembolus ([address] right leg Past Surgical History:  
Procedure Laterality Date  ABDOMEN SURGERY PROC UNLISTED  2/2011  
 drainage abdominal wall abscess [address]  HX CARPAL TUNNEL RELEASE Right [address]  HX OOPHORECTOMY  early 2000's Rt ovary removed. Left ovary intact  HX ORTHOPAEDIC    
 left foot~pt denies  HX OTHER SURGICAL  2007  
 abscess drained~pt states no  HX TUBAL LIGATION Family History:  
Problem Relation Age of Onset  Colon Cancer Mother  Cancer Father   
     lung (smoker)  Hypertension Brother  Heart Disease Brother  Heart Attack Brother  Stroke Brother  Cancer Other   
     mother - colon Social History Socioeconomic History  Marital status:  Spouse name: Not on file  Number of children: Not on file  Years of education: Not on file  Highest education level: Not on file Social Needs  Financial resource strain: Not on file  Food insecurity - worry: Not on file  Food insecurity - inability: Not on file  Transportation needs - medical: Not on file  Transportation needs - non-medical: Not on file Occupational History  Not on file Tobacco Use  Smoking status: Never Smoker  Smokeless tobacco: Never Used Substance and Sexual Activity  Alcohol use: No  
 Drug use: No  
 Sexual activity: Not on file Other Topics Concern [address] Service Not Asked  Blood Transfusions Not Asked  Caffeine Concern Not Asked  Occupational Exposure Not Asked [place] Hazards Not Asked  Sleep Concern Not Asked  Stress Concern Not Asked  Weight Concern Not Asked  Special Diet Not Asked  Back Care Not Asked  Exercise Not Asked  Bike Helmet Not Asked [address] [facility] Yes  Self-Exams Not Asked Social History Narrative Has PCP Dr Praful Dangelo in [facility] Lives with  at home ALLERGIES: Amoxicillin; Doxycycline; and Pravastatin Review of Systems Constitutional: Negative for chills and fever. Gastrointestinal: Negative for nausea and vomiting. All other systems reviewed and are negative. Vitals:  
 02/03/19 1229 BP: (!) 163/126 Pulse: (!) 170 Resp: 24 Temp: 98 °F (36.7 °C) SpO2: 100% Weight: 88.5 kg (195 lb) Height: 5' 2\" (1.575 m) Physical Exam  
Constitutional: She is oriented to person, place, and time. She appears well-developed and well-nourished. HENT:  
Head: Normocephalic and atraumatic. Eyes: Conjunctivae are normal. Pupils are equal, round, and reactive to light. Neck: Normal range of motion. Neck supple. Cardiovascular: No murmur heard. Heart rate in the 180s per bedside monitor Pulmonary/Chest: Breath sounds normal. No respiratory distress. Abdominal: Soft. There is no tenderness. Musculoskeletal: She exhibits no edema or tenderness. Neurological: She is alert and oriented to person, place, and time. Skin: Skin is warm and dry. Psychiatric: She has a normal mood and affect. Her behavior is normal.  
Nursing note and vitals reviewed. MDM Number of Diagnoses or Management Options Atrial fibrillation with rapid ventricular response ([address]): established and worsening Essential hypertension:  
Type 2 diabetes mellitus without complication, without long-term current use of insulin [facility]):  
Diagnosis management comments: 12:59 PM heart rate went from 190s down to 140s after Cardizem 20 mg. She was given an additional 20 mg bolus, heart rate came down to 90s briefly and then into the 120s. She will be given metoprolol 5 mg 
1:16 PM heart rate now in the 90s after metoprolol, still in atrial fibrillation 1:28 PM patient now in the 130s, we will start her on Cardizem drip 2:15 PM heart rate improved on Cardizem 2:47 PM patient continues to be tachycardic in the 110-130 range on Cardizem 15 mg/hr. Cardiology has been paged. 4:27 PM patient has converted to a sinus rhythm with a rate in the 50s. I spoke with Dr. Citlaly Sargent, he requests the patient be discharged on Eliquis and Toprol 25 mg BID and flecainide 50 mg BID Total critical care time spent on initial evaluation, repeat evaluations, managing her care, looking up old records, speaking with consultant and obtaining additional history from the family was forty-five minutes. Amount and/or Complexity of Data Reviewed Clinical lab tests: ordered and reviewed Tests in the radiology section of CPT®: ordered and reviewed Tests in the medicine section of CPT®: ordered and reviewed Decide to obtain previous medical records or to obtain history from someone other than the patient: yes Discuss the patient with other providers: yes Risk of Complications, Morbidity, and/or Mortality Presenting problems: high Diagnostic procedures: high Management options: high Critical Care Total time providing critical care: 30-74 minutes Patient Progress Patient progress: improved Procedures

## 2019-02-03 NOTE — DISCHARGE INSTRUCTIONS
Follow-up with [facility] [place] this week. They will call you with an appointment time. Return to the emergency department if your symptoms recur.

## 2019-02-03 NOTE — ED NOTES
Pt ambulatory with no assistance. Remains in NSR with no symptoms. I have reviewed discharge instructions with the patient. The patient verbalized understanding. Patient left ED via Discharge Method: ambulatory to Home with . Opportunity for questions and clarification provided. Patient given 3 scripts. To continue your aftercare when you leave the hospital, you may receive an automated call from our care team to check in on how you are doing. This is a free service and part of our promise to provide the best care and service to meet your aftercare needs.  If you have questions, or wish to unsubscribe from this service please call [phone]. Thank you for Choosing our [employer] Emergency Department.

## 2019-02-03 NOTE — H&P
History of present illness:68 y.o. female Previous history of atrial fibrillation diagnosed several years before. Patient stated she was on a cruise approximate 5 years before presentation 2/3/2019. Patient was diagnosed with atrial fibrillation at that time. She had follow up with [facility] with Dr. Katerine Rodrigues. According to patient's subsequent monitoring did not reveal atrial arrhythmias. She reported no recurrent symptoms. On 2/3/2019 patient described episodes of palpitation shortness of breath. She was discovered to be in atrial fibrillation with rapid ventricular response in the emergency department. Placed on intravenous Cardizem with improvement of symptoms. At the time of exam patient with much improvement of symptoms. Assessment 1. Atrial fibrillation previous diagnosis but no recurrence. Now atrial fibrillation with rapid ventricular response initiate IV Cardizem. Patient has [id number] Score for Atrial Fibrillation Stroke Risk score of 3  
2. Based on her risk factors for age diabetes and female gender. We discussed risks of anticoagulation therapy including major bleeding and intracranial hemorrhage. Patient agrees to initiate anticoagulation for stroke prevention. If patient does not achieve successful cardioversion additional rhythm control strategies will be discussed. Likely transesophageal echo and cardioversion 2/4/2019. Patient nothing by mouth. 3. Elevated white count chest radiograph does not show infiltrate no left shift plan for UA 4. Diabetes continue home medications with sliding scale insulin 5. Hyperlipidemia patient has previous allergies to statins additional lipid strategy should be discussed at follow-up. Addendum Patient converted to sinus rhythm spontaneously. She will be discharged with office follow-up. Initiate flecanide and 50 mg by mouth twice a day, metoprolol 25 by mouth twice a day eliquis. Review of Systems Constitutional: Negative for fever. Respiratory: Negative for cough. Cardiovascular: Negative for chest pain. Genitourinary: Negative for dysuria. Musculoskeletal: Negative for myalgias. Skin: Negative for rash. Neurological: Negative for dizziness. Psychiatric/Behavioral: Negative for depression. Past Medical History:  
Diagnosis Date  Arrhythmia   
 episode A. Fib with tachycardia 1/2014 (on a cruise) Converted back to normal sinus rhythm with Amiodarone  Arthritis   
 in hands  Cellulitis   
 history of cellulitis right foot, leg (hosp. 8/19/11) and 2nd episode in 1/2014 (not hospitalized);turned into ulcer on toe  Depression  Diabetes ([facility] [address] dx 2010  
 type 2, oral med, avg fbs 130-170, notices s/s hypoglycemia at 60, unsure last HA1C  
 Diverticulosis of colon (without mention of hemorrhage)  Edema extremities   
 chronic (treated with daily Furosemide)  Foot ulcer ([address]  GERD (gastroesophageal reflux disease)   
 occ. episode with tomato-type products, relieved with rolaids or tums  Hammer toe  Hypercholesterolemia   
 no medication  Hypertension   
 controlled with meds  Neuropathy   
 in bilateral feet; no medication  Obese 2/18/14 BMI 39.1  PUD (peptic ulcer disease) 1971  Thromboembolus ([address] right leg Past Surgical History:  
Procedure Laterality Date  ABDOMEN SURGERY PROC UNLISTED  2/2011  
 drainage abdominal wall abscess [address]  HX CARPAL TUNNEL RELEASE Right [address]  HX OOPHORECTOMY  early 2000's Rt ovary removed. Left ovary intact  HX ORTHOPAEDIC    
 left foot~pt denies  HX OTHER SURGICAL  2007  
 abscess drained~pt states no  HX TUBAL LIGATION Family History Problem Relation Age of Onset  Colon Cancer Mother  Cancer Father   
     lung (smoker)  Hypertension Brother  Heart Disease Brother  Heart Attack Brother  Stroke Brother  Cancer Other   
     mother - colon Social History Socioeconomic History  Marital status:  Spouse name: Not on file  Number of children: Not on file  Years of education: Not on file  Highest education level: Not on file Social Needs  Financial resource strain: Not on file  Food insecurity - worry: Not on file  Food insecurity - inability: Not on file  Transportation needs - medical: Not on file  Transportation needs - non-medical: Not on file Occupational History  Not on file Tobacco Use  Smoking status: Never Smoker  Smokeless tobacco: Never Used Substance and Sexual Activity  Alcohol use: No  
 Drug use: No  
 Sexual activity: Not on file Other Topics Concern [address] Service Not Asked  Blood Transfusions Not Asked  Caffeine Concern Not Asked  Occupational Exposure Not Asked [place] Hazards Not Asked  Sleep Concern Not Asked  Stress Concern Not Asked  Weight Concern Not Asked  Special Diet Not Asked  Back Care Not Asked  Exercise Not Asked  Bike Helmet Not Asked [address] Yes  Self-Exams Not Asked Social History Narrative Has PCP [facility] Paul Whiet in [facility] Lives with  at home Current Facility-Administered Medications Medication Dose Route Frequency  dilTIAZem (CARDIZEM) 125 mg in dextrose 5% 125 mL infusion  5 mg/hr IntraVENous TITRATE  sodium chloride (NS) flush 5-40 mL  5-40 mL IntraVENous Q8H  
 sodium chloride (NS) flush 5-40 mL  5-40 mL IntraVENous PRN  
 insulin lispro (HUMALOG) injection   SubCUTAneous AC&HS  
 apixaban (ELIQUIS) tablet 5 mg  5 mg Oral Q12H Current Outpatient Medications Medication Sig  
 citalopram (CELEXA) 20 mg tablet TAKE 1 TAB BY MOUTH NIGHTLY.  benazepril (LOTENSIN) 20 mg tablet TAKE 1 TABLET BY MOUTH EVERY DAY  glyBURIDE-metFORMIN (GLUCOVANCE) 5-500 mg per tablet TAKE 1 TABLET ORAL TWO TIMES DAILY  furosemide (LASIX) 20 mg tablet TAKE 1 TABLET BY MOUTH EVERY DAY  therapeutic multivitamin (THERAGRAN) tablet Take 1 Tab by mouth daily.  glucose blood VI test strips (CONTOUR NEXT STRIPS) strip Test blood sugars once daily  FISH OIL/BORAGE/FLAX/OM3,6,9#1 (OMEGA 3-6-9 COMPLEX PO) Take  by mouth daily.  b complex vitamins tablet Take 1 Tab by mouth daily.  cholecalciferol, VITAMIN D3, (VITAMIN D3) 5,000 unit tab tablet Take 5,000 Units by mouth every morning.  ASCORBATE CALCIUM (VITAMIN C PO) Take 1 Tab by mouth every morning. Visit Vitals /55 Pulse (!) 51 Temp 98 °F (36.7 °C) Resp 24 Ht 5' 2\" (1.575 m) Wt 88.5 kg (195 lb) SpO2 98% BMI 35.67 kg/m² Physical Exam 
No intake or output data in the 24 hours ending 02/03/19 1529 Lab Results Component Value Date/Time Sodium 138 02/03/2019 12:33 PM  
 Potassium 3.5 02/03/2019 12:33 PM  
 Chloride 100 02/03/2019 12:33 PM  
 CO2 28 02/03/2019 12:33 PM  
 Anion gap 10 02/03/2019 12:33 PM  
 Glucose 160 (H) 02/03/2019 12:33 PM  
 BUN 16 02/03/2019 12:33 PM  
 Creatinine 0.72 02/03/2019 12:33 PM  
 BUN/Creatinine ratio 31 (H) 12/20/2018 09:26 AM  
 GFR est AA >60 02/03/2019 12:33 PM  
 GFR est non-AA >60 02/03/2019 12:33 PM  
 Calcium 10.5 (H) 02/03/2019 12:33 PM  
 
Lab Results Component Value Date/Time WBC 11.2 (H) 02/03/2019 12:33 PM  
 HGB 16.4 (H) 02/03/2019 12:33 PM  
 HCT 48.5 (H) 02/03/2019 12:33 PM  
 PLATELET 811 24/55/2549 12:33 PM  
 MCV 91.2 02/03/2019 12:33 PM  
 
No results found for: CPK, RCK1, RCK2, RCK3, RCK4, CKMB, CKNDX, CKND1, TROPT, TROIQ, BNPP, BNP Lab Results Component Value Date/Time Hemoglobin A1c 7.3 (H) 12/20/2018 09:26 AM  
 Hemoglobin A1c, External 7.0 01/15/2015 Assessment: 
[unfilled] Recommendations:

## 2019-02-04 ENCOUNTER — PATIENT OUTREACH (OUTPATIENT)
Dept: OTHER | Age: 69
End: 2019-02-04

## 2019-02-04 NOTE — PROGRESS NOTES
Patient on report as eligible for Case Management. Left discreet message on voicemail with this CM contact information. Will attempt to contact again to offer [phone] Management services.

## 2019-02-05 ENCOUNTER — PATIENT OUTREACH (OUTPATIENT)
Dept: OTHER | Age: 69
End: 2019-02-05

## 2019-02-05 ENCOUNTER — HOSPITAL ENCOUNTER (OUTPATIENT)
Dept: LAB | Age: 69
Discharge: HOME | End: 2019-02-05
Payer: COMMERCIAL

## 2019-02-05 DIAGNOSIS — Z79.899 LONG TERM CURRENT USE OF ANTIARRHYTHMIC DRUG: ICD-10-CM

## 2019-02-05 DIAGNOSIS — R06.02 SHORTNESS OF BREATH: ICD-10-CM

## 2019-02-05 DIAGNOSIS — Z79.01 ANTICOAGULANT LONG-TERM USE: ICD-10-CM

## 2019-02-05 PROBLEM — E66.01 SEVERE OBESITY (HCC): Status: ACTIVE | Noted: 2019-02-05

## 2019-02-05 LAB
T4 SERPL-MCNC: 8.6 UG/DL (ref 4.8–13.9)
TSH SERPL DL<=0.005 MIU/L-ACNC: 2.31 UIU/ML (ref 0.36–3.74)

## 2019-02-05 PROCEDURE — 84436 ASSAY OF TOTAL THYROXINE: CPT

## 2019-02-05 PROCEDURE — 84443 ASSAY THYROID STIM HORMONE: CPT

## 2019-02-05 PROCEDURE — 36415 COLL VENOUS BLD VENIPUNCTURE: CPT

## 2019-02-05 NOTE — PROGRESS NOTES
Verified  and address for HIPAA security. Introduced [employer] for patient. Patient does not identify any Care Management needs at this time and declines services. Asked patient if it would be alright for me to remain her care manager so I would be notified if she had any hospitalizations. She said that would be fine.

## 2019-02-26 ENCOUNTER — HOSPITAL ENCOUNTER (OUTPATIENT)
Dept: LAB | Age: 69
Discharge: HOME | End: 2019-02-26
Attending: INTERNAL MEDICINE
Payer: COMMERCIAL

## 2019-02-26 DIAGNOSIS — R94.39 ABNORMAL STRESS TEST: ICD-10-CM

## 2019-02-26 LAB
ANION GAP SERPL CALC-SCNC: 10 MMOL/L
BASOPHILS # BLD: 0 K/UL (ref 0–0.2)
BASOPHILS NFR BLD: 0 % (ref 0–2)
BUN SERPL-MCNC: 26 MG/DL (ref 8–23)
CALCIUM SERPL-MCNC: 9.4 MG/DL (ref 8.3–10.4)
CHLORIDE SERPL-SCNC: 105 MMOL/L (ref 98–107)
CO2 SERPL-SCNC: 26 MMOL/L (ref 21–32)
CREAT SERPL-MCNC: 0.7 MG/DL (ref 0.6–1)
DIFFERENTIAL METHOD BLD: NORMAL
EOSINOPHIL # BLD: 0.1 K/UL (ref 0–0.8)
EOSINOPHIL NFR BLD: 1 % (ref 0.5–7.8)
ERYTHROCYTE [DISTWIDTH] IN BLOOD BY AUTOMATED COUNT: 13.4 % (ref 11.9–14.6)
GLUCOSE SERPL-MCNC: 90 MG/DL (ref 65–100)
HCT VFR BLD AUTO: 41.9 % (ref 35.8–46.3)
HGB BLD-MCNC: 14.2 G/DL (ref 11.7–15.4)
IMM GRANULOCYTES # BLD AUTO: 0 K/UL (ref 0–0.5)
IMM GRANULOCYTES NFR BLD AUTO: 0 % (ref 0–5)
LYMPHOCYTES # BLD: 3.7 K/UL (ref 0.5–4.6)
LYMPHOCYTES NFR BLD: 35 % (ref 13–44)
MCH RBC QN AUTO: 31.1 PG (ref 26.1–32.9)
MCHC RBC AUTO-ENTMCNC: 33.9 G/DL (ref 31.4–35)
MCV RBC AUTO: 91.9 FL (ref 79.6–97.8)
MONOCYTES # BLD: 0.7 K/UL (ref 0.1–1.3)
MONOCYTES NFR BLD: 7 % (ref 4–12)
NEUTS SEG # BLD: 6 K/UL (ref 1.7–8.2)
NEUTS SEG NFR BLD: 57 % (ref 43–78)
NRBC # BLD: 0 K/UL (ref 0–0.2)
PLATELET # BLD AUTO: 168 K/UL (ref 150–450)
PMV BLD AUTO: 10.1 FL (ref 9.4–12.3)
POTASSIUM SERPL-SCNC: 4 MMOL/L (ref 3.5–5.1)
RBC # BLD AUTO: 4.56 M/UL (ref 4.05–5.2)
SODIUM SERPL-SCNC: 141 MMOL/L (ref 136–145)
WBC # BLD AUTO: 10.6 K/UL (ref 4.3–11.1)

## 2019-02-26 PROCEDURE — 80048 BASIC METABOLIC PNL TOTAL CA: CPT

## 2019-02-26 PROCEDURE — 85025 COMPLETE CBC W/AUTO DIFF WBC: CPT

## 2019-02-26 PROCEDURE — 36415 COLL VENOUS BLD VENIPUNCTURE: CPT

## 2019-02-27 ENCOUNTER — HOSPITAL ENCOUNTER (OUTPATIENT)
Dept: CARDIAC CATH/INVASIVE PROCEDURES | Age: 69
Discharge: HOME | End: 2019-02-27
Attending: INTERNAL MEDICINE | Admitting: INTERNAL MEDICINE
Payer: COMMERCIAL

## 2019-02-27 VITALS
HEIGHT: 62 IN | BODY MASS INDEX: 37.36 KG/M2 | OXYGEN SATURATION: 97 % | WEIGHT: 203 LBS | RESPIRATION RATE: 15 BRPM | HEART RATE: 51 BPM | DIASTOLIC BLOOD PRESSURE: 61 MMHG | SYSTOLIC BLOOD PRESSURE: 129 MMHG

## 2019-02-27 LAB
ACT BLD: 224 SECS (ref 70–128)
ACT BLD: 246 SECS (ref 70–128)
ACT BLD: 296 SECS (ref 70–128)
ATRIAL RATE: 44 BPM
CALCULATED P AXIS, ECG09: 31 DEGREES
CALCULATED R AXIS, ECG10: -12 DEGREES
CALCULATED T AXIS, ECG11: 44 DEGREES
DIAGNOSIS, 93000: NORMAL
GLUCOSE BLD STRIP.AUTO-MCNC: 184 MG/DL (ref 65–100)
P-R INTERVAL, ECG05: 170 MS
Q-T INTERVAL, ECG07: 524 MS
QRS DURATION, ECG06: 106 MS
QTC CALCULATION (BEZET), ECG08: 448 MS
VENTRICULAR RATE, ECG03: 44 BPM

## 2019-02-27 PROCEDURE — 99152 MOD SED SAME PHYS/QHP 5/>YRS: CPT

## 2019-02-27 PROCEDURE — C1894 INTRO/SHEATH, NON-LASER: HCPCS

## 2019-02-27 PROCEDURE — 93005 ELECTROCARDIOGRAM TRACING: CPT | Performed by: INTERNAL MEDICINE

## 2019-02-27 PROCEDURE — 77030019569 HC BND COMPR RAD TERU -B

## 2019-02-27 PROCEDURE — 93571 IV DOP VEL&/PRESS C FLO 1ST: CPT

## 2019-02-27 PROCEDURE — C1725 CATH, TRANSLUMIN NON-LASER: HCPCS

## 2019-02-27 PROCEDURE — 82962 GLUCOSE BLOOD TEST: CPT

## 2019-02-27 PROCEDURE — 74011636320 HC RX REV CODE- 636/320: Performed by: INTERNAL MEDICINE

## 2019-02-27 PROCEDURE — 93572 IV DOP VEL&/PRESS C FLO EA: CPT

## 2019-02-27 PROCEDURE — 85347 COAGULATION TIME ACTIVATED: CPT

## 2019-02-27 PROCEDURE — 93458 L HRT ARTERY/VENTRICLE ANGIO: CPT

## 2019-02-27 PROCEDURE — 74011000250 HC RX REV CODE- 250: Performed by: INTERNAL MEDICINE

## 2019-02-27 PROCEDURE — 74011250637 HC RX REV CODE- 250/637: Performed by: INTERNAL MEDICINE

## 2019-02-27 PROCEDURE — C1769 GUIDE WIRE: HCPCS

## 2019-02-27 PROCEDURE — 77030004534 HC CATH ANGI DX INFN CARD -A

## 2019-02-27 PROCEDURE — 99153 MOD SED SAME PHYS/QHP EA: CPT

## 2019-02-27 PROCEDURE — 74011250636 HC RX REV CODE- 250/636

## 2019-02-27 PROCEDURE — C1887 CATHETER, GUIDING: HCPCS

## 2019-02-27 PROCEDURE — 74011250636 HC RX REV CODE- 250/636: Performed by: INTERNAL MEDICINE

## 2019-02-27 RX ORDER — MAG HYDROX/ALUMINUM HYD/SIMETH 200-200-20
30 SUSPENSION, ORAL (FINAL DOSE FORM) ORAL AS NEEDED
Status: DISCONTINUED | OUTPATIENT
Start: 2019-02-27 | End: 2019-02-27 | Stop reason: HOSPADM

## 2019-02-27 RX ORDER — HEPARIN SODIUM 10000 [USP'U]/ML
1000-10000 INJECTION, SOLUTION INTRAVENOUS; SUBCUTANEOUS
Status: DISCONTINUED | OUTPATIENT
Start: 2019-02-27 | End: 2019-02-27 | Stop reason: HOSPADM

## 2019-02-27 RX ORDER — FUROSEMIDE 20 MG/1
20 TABLET ORAL DAILY
COMMUNITY
End: 2019-09-24 | Stop reason: SDUPTHER

## 2019-02-27 RX ORDER — GLYBURIDE-METFORMIN HYDROCHLORIDE 5; 500 MG/1; MG/1
1 TABLET ORAL
COMMUNITY
End: 2019-03-13 | Stop reason: ALTCHOICE

## 2019-02-27 RX ORDER — SODIUM CHLORIDE 9 MG/ML
75 INJECTION, SOLUTION INTRAVENOUS CONTINUOUS
Status: DISCONTINUED | OUTPATIENT
Start: 2019-02-27 | End: 2019-02-27 | Stop reason: HOSPADM

## 2019-02-27 RX ORDER — ISOSORBIDE MONONITRATE 30 MG/1
30 TABLET, EXTENDED RELEASE ORAL DAILY
Qty: 30 TAB | Refills: 11 | Status: SHIPPED | OUTPATIENT
Start: 2019-02-27 | End: 2020-02-20 | Stop reason: SDUPTHER

## 2019-02-27 RX ORDER — GUAIFENESIN 100 MG/5ML
81-324 LIQUID (ML) ORAL ONCE
Status: COMPLETED | OUTPATIENT
Start: 2019-02-27 | End: 2019-02-27

## 2019-02-27 RX ORDER — DIAZEPAM 5 MG/1
5 TABLET ORAL ONCE
Status: DISCONTINUED | OUTPATIENT
Start: 2019-02-27 | End: 2019-02-27 | Stop reason: HOSPADM

## 2019-02-27 RX ORDER — SODIUM CHLORIDE 0.9 % (FLUSH) 0.9 %
5-40 SYRINGE (ML) INJECTION EVERY 8 HOURS
Status: DISCONTINUED | OUTPATIENT
Start: 2019-02-27 | End: 2019-02-27 | Stop reason: HOSPADM

## 2019-02-27 RX ORDER — HYDROCODONE BITARTRATE AND ACETAMINOPHEN 5; 325 MG/1; MG/1
1 TABLET ORAL
Status: DISCONTINUED | OUTPATIENT
Start: 2019-02-27 | End: 2019-02-27 | Stop reason: HOSPADM

## 2019-02-27 RX ORDER — BENAZEPRIL HYDROCHLORIDE 20 MG/1
20 TABLET ORAL DAILY
COMMUNITY
End: 2019-09-24 | Stop reason: SDUPTHER

## 2019-02-27 RX ORDER — LIDOCAINE HYDROCHLORIDE 10 MG/ML
2-10 INJECTION INFILTRATION; PERINEURAL
Status: DISCONTINUED | OUTPATIENT
Start: 2019-02-27 | End: 2019-02-27 | Stop reason: HOSPADM

## 2019-02-27 RX ORDER — ACETAMINOPHEN 325 MG/1
650 TABLET ORAL
Status: DISCONTINUED | OUTPATIENT
Start: 2019-02-27 | End: 2019-02-27 | Stop reason: HOSPADM

## 2019-02-27 RX ORDER — ATORVASTATIN CALCIUM 10 MG/1
10 TABLET, FILM COATED ORAL DAILY
Qty: 30 TAB | Refills: 11 | Status: SHIPPED | OUTPATIENT
Start: 2019-02-27 | End: 2020-03-09 | Stop reason: SDUPTHER

## 2019-02-27 RX ORDER — ONDANSETRON 2 MG/ML
4 INJECTION INTRAMUSCULAR; INTRAVENOUS
Status: DISCONTINUED | OUTPATIENT
Start: 2019-02-27 | End: 2019-02-27 | Stop reason: HOSPADM

## 2019-02-27 RX ORDER — MIDAZOLAM HYDROCHLORIDE 1 MG/ML
.5-2 INJECTION, SOLUTION INTRAMUSCULAR; INTRAVENOUS
Status: DISCONTINUED | OUTPATIENT
Start: 2019-02-27 | End: 2019-02-27 | Stop reason: HOSPADM

## 2019-02-27 RX ORDER — MORPHINE SULFATE 2 MG/ML
1 INJECTION, SOLUTION INTRAMUSCULAR; INTRAVENOUS
Status: DISCONTINUED | OUTPATIENT
Start: 2019-02-27 | End: 2019-02-27 | Stop reason: HOSPADM

## 2019-02-27 RX ORDER — NALOXONE HYDROCHLORIDE 0.4 MG/ML
0.4 INJECTION, SOLUTION INTRAMUSCULAR; INTRAVENOUS; SUBCUTANEOUS AS NEEDED
Status: DISCONTINUED | OUTPATIENT
Start: 2019-02-27 | End: 2019-02-27 | Stop reason: HOSPADM

## 2019-02-27 RX ORDER — FENTANYL CITRATE 50 UG/ML
25-100 INJECTION, SOLUTION INTRAMUSCULAR; INTRAVENOUS
Status: DISCONTINUED | OUTPATIENT
Start: 2019-02-27 | End: 2019-02-27 | Stop reason: HOSPADM

## 2019-02-27 RX ORDER — HEPARIN SODIUM 200 [USP'U]/100ML
2 INJECTION, SOLUTION INTRAVENOUS CONTINUOUS
Status: ACTIVE | OUTPATIENT
Start: 2019-02-27 | End: 2019-02-27

## 2019-02-27 RX ORDER — SODIUM CHLORIDE 0.9 % (FLUSH) 0.9 %
5-40 SYRINGE (ML) INJECTION AS NEEDED
Status: DISCONTINUED | OUTPATIENT
Start: 2019-02-27 | End: 2019-02-27 | Stop reason: HOSPADM

## 2019-02-27 RX ADMIN — HEPARIN SODIUM 4000 UNITS: 10000 INJECTION INTRAVENOUS; SUBCUTANEOUS at 08:52

## 2019-02-27 RX ADMIN — ASPIRIN 81 MG 324 MG: 81 TABLET ORAL at 07:32

## 2019-02-27 RX ADMIN — HEPARIN SODIUM 2 ML: 10000 INJECTION, SOLUTION INTRAVENOUS; SUBCUTANEOUS at 08:42

## 2019-02-27 RX ADMIN — NITROGLYCERIN 0.1 MG: 200 INJECTION, SOLUTION INTRAVENOUS at 09:18

## 2019-02-27 RX ADMIN — LIDOCAINE HYDROCHLORIDE 2 ML: 10 INJECTION, SOLUTION INFILTRATION; PERINEURAL at 08:42

## 2019-02-27 RX ADMIN — HEPARIN SODIUM 2000 UNITS: 10000 INJECTION INTRAVENOUS; SUBCUTANEOUS at 09:17

## 2019-02-27 RX ADMIN — HEPARIN SODIUM 2 ML/HR: 5000 INJECTION, SOLUTION INTRAVENOUS; SUBCUTANEOUS at 08:23

## 2019-02-27 RX ADMIN — TICAGRELOR 180 MG: 90 TABLET ORAL at 08:54

## 2019-02-27 RX ADMIN — MIDAZOLAM HYDROCHLORIDE 2 MG: 1 INJECTION, SOLUTION INTRAMUSCULAR; INTRAVENOUS at 08:42

## 2019-02-27 RX ADMIN — IOPAMIDOL 200 ML: 755 INJECTION, SOLUTION INTRAVENOUS at 09:48

## 2019-02-27 RX ADMIN — ALUMINUM HYDROXIDE, MAGNESIUM HYDROXIDE, AND SIMETHICONE 30 ML: 200; 200; 20 SUSPENSION ORAL at 09:54

## 2019-02-27 RX ADMIN — SODIUM CHLORIDE 75 ML/HR: 900 INJECTION, SOLUTION INTRAVENOUS at 07:33

## 2019-02-27 RX ADMIN — FENTANYL CITRATE 50 MCG: 50 INJECTION, SOLUTION INTRAMUSCULAR; INTRAVENOUS at 08:42

## 2019-02-27 RX ADMIN — HEPARIN SODIUM 2000 UNITS: 10000 INJECTION INTRAVENOUS; SUBCUTANEOUS at 09:07

## 2019-02-27 NOTE — PROGRESS NOTES
Patient received to [address] [place] [address] # 10  Ambulatory from [facility]. Patient scheduled for [facility] today with Dr Ras Woodall. Procedure reviewed & questions answered, voiced good understanding consent obtained & placed on chart. All medications and medical history reviewed. Will prep patient per orders. Patient & family updated on plan of care. The patient has a fraility score of 3-MANAGING WELL, based on patient A&Ox3, patient able to ambulate to room without difficulty.

## 2019-02-27 NOTE — DISCHARGE INSTRUCTIONS
DO NOT TAKE METFORMIN (GLUCOPHAGE) UNTIL Friday AFTERNOON. HEART CATHETERIZATION/ANGIOGRAPHY DISCHARGE INSTRUCTIONS    1. Check puncture site frequently for swelling or bleeding. If there is any bleeding, lie down and apply pressure over the area with a clean towel or washcloth and call 911. Notify your doctor for any redness, swelling, drainage, or oozing from the puncture site. Notify your doctor for any fever or chills. 2. If the extremity becomes cold, numb, or painful call Dr. Villalta Bring at [phone].  3. Activity should be limited for the next 48 hours. Avoid pushing, pulling and bending of affected wrist for 48 hours. No heavy lifting (anything over 5 pounds) for 3 days. No driving for 48 hours. 4. You may resume your usual diet. Drink more fluids than usual.  5. Have a responsible person drive you home and stay with you for at least 24 hours after your heart catheterization/angiography. 6. You may remove bandage from your right arm  in 24 hours. You may shower in 24 hours. No tub baths, hot tubs, or swimming for 1 week. Do not place any lotions, creams, powders, or ointments over puncture site for 1 week. You may place a clean band-aid over the puncture site each day for 5 days. Change daily. I have read the above instructions and have had the opportunity to ask questions.

## 2019-02-27 NOTE — PROGRESS NOTES
Patient up to bedside, vital signs and site stable. Patient ambulated to bathroom without difficulty. Patient voided without difficulty. Vascular site stable. 1315 Discharge instructions and home medications reviewed with patient. Time allowed for questions and answers. 1320 Patient ambulated second time without difficulty. Site stable after ambulation. Peripheral IV sites dc'd without difficulty with tips intact. 1325 Patient discharged to home with family.

## 2019-02-27 NOTE — PROCEDURES
Brief Cardiac Procedure Note    Patient: Evelyn Parks MRN: [id number]  SSN: [id number]    YOB: 1950  Age: 70 y.o. Sex: female      Date of Procedure: 2/27/2019     Pre-procedure Diagnosis: Shortness of Breath    Post-procedure Diagnosis: Coronary Artery Disease    Reason for Procedure: New Onset Angina < or = 2 Months    Procedure: Left Heart Catheterization    Brief Description of Procedure: LHC via R radial artery, iFR mLAD, iFR OM1, iFR OM2, attempted PCI to dCirc could not pass balloon. Performed By: Rodney Montoya MD     Assistants: None    Anesthesia: Moderate Sedation    Estimated Blood Loss: Less than 10 mL      Specimens: None    Implants: None    Findings: LM normal , LAD mid vessel 60% iFR: 0.94, OM1 p60% iFR 1.00, OM2 p70% iFR 1.00. dCirc 80% small vessel sub 2 mm. RCA mid vessel 40%. LVEF 55-60% with LVEDP 25 mmHg    Complications: None    Recommendations: Continue medical therapy.     Signed By: Rodney Montoya MD     February 27, 2019

## 2019-02-27 NOTE — PROGRESS NOTES
Report received from Rachel/Jahaira Cath Lab RN. Procedural findings communicated. Intra procedural  medication administration reviewed. Progression of care discussed. Patient received into [facility] 6 post sheath removal.  
 
Access site without bleeding or swelling yes Dressing dry and intact yes Patient instructed to limit movement to right upper extremity Routine post procedural vital signs and site assessment initiated yes

## 2019-02-27 NOTE — PROGRESS NOTES
TRANSFER - OUT REPORT: 
 
Verbal report given to ***(name) on Karthikeyan Dong  being transferred to CPRU(unit) for routine progression of care Report consisted of patients Situation, Background, Assessment and  
Recommendations(SBAR). Information from the following report(s) Procedure Summary, MAR and Cardiac Rhythm SB  was reviewed with the receiving nurse. Lines:  
Peripheral IV 02/27/19 Anterior; Left Forearm (Active) Peripheral IV 02/27/19 Anterior;Proximal;Right Forearm (Active) Opportunity for questions and clarification was provided. Patient transported with: 
 Registered Nurse [facility] Dr Amber Peres IFR LAD/OM1/OM2 Brilinta 180mg PO Heparin 5000 untis IA Heparin 8000 units IV 
ACT 0925 = 296 Versed 2mg IV Fent 50mcg IV

## 2019-02-27 NOTE — PROGRESS NOTES
Terumo band completely deflated. 1300 Terumo band removed from right wrist using sterile technique. Sterile dressing applied. No signs and symptoms of bleeding, oozing or hematoma.

## 2019-02-28 ENCOUNTER — PATIENT OUTREACH (OUTPATIENT)
Dept: OTHER | Age: 69
End: 2019-02-28

## 2019-02-28 NOTE — PROGRESS NOTES
Patient on report as eligible for Case Management. Left discreet message on voicemail with this CM contact information. Will attempt to contact again to offer [phone] Management services.

## 2019-03-01 NOTE — PROCEDURES
[facility]  CARDIAC CATH    Name:  Tessie Canada  MR#:  [id number]  :  1950  ACCOUNT #:  [de-identified]  DATE OF SERVICE:  2019    INTERVENTIONAL CARDIOLOGIST:  Gary Morales. Dread Pardo MD    PRIMARY CARDIOLOGIST:  Janessa Benavides. Sander Dexter MD    INDICATION:  The patient is a 57-year-old female who was recently evaluated in Cardiology Clinic with history of atrial fib and was found to have lateral ischemia on a stress test.  She is brought to the heart catheterization lab to define her coronary anatomy. ESTIMATED BLOOD LOSS:  Less than 5 mL. SEDATION:  The patient was given moderate sedation. SPECIMENS REMOVED:  None. COMPLICATIONS:  None. ASSISTANT:  None. Pre-procedure time-out was completed. Mallampati score of 2. ASA score of 2. PROCEDURE:  After informed consent, the patient was prepped and draped in usual sterile fashion. The right wrist was infiltrated with lidocaine. The right radial artery was accessed via the modified Seldinger technique with 6-Colombian sheath. A Terumo band was used for hemostasis. CATHETERS USED:  Included a 5-Colombian JL3.5, 5-Colombian JR5 and a 6-Colombian XB 3.5 guide. FINDINGS:  1. Left ventricle:  Left ventricle ejection fraction is estimated at 55% to 60% with normal wall motion. 2.  LVEDP was 25 mmHg. 3.  Left main:  Normal.  4.  Left anterior descending had a mid vessel 60% stenosis that was iFR'd and found to be 0.94.  5.  Circumflex had luminal irregularities throughout. Obtuse marginal 1 had proximal 60% stenosis and iFR was done of this and it was 1.0. OM2 had a proximal 70% stenosis and iFR measurement was done at this and it was 1.0. The distal circumflex had 80% stenosis and a small sub 2 mm vessel. INTERVENTION:  It was decided to try to intervene upon the distal circumflex. A wire was easily passed across the lesion; however, we were unable to advance balloons across the lesion.   Therefore, it was decided not to attempt percutaneous intervention. RECOMMENDATIONS:  We will continue her therapy for coronary artery disease and angina. I will have her follow up in clinic in the next 1-2 weeks.       Melony Renteria MD      [id number]  D:  03/01/2019 7:58  T:  03/01/2019 12:39  JOB #:  [id number]

## 2019-03-08 ENCOUNTER — PATIENT OUTREACH (OUTPATIENT)
Dept: OTHER | Age: 69
End: 2019-03-08

## 2019-03-08 NOTE — PROGRESS NOTES
Verified  and address for HIPAA security. Introduced [employer] for patient. Patient does not identify any Care Management needs at this time and declines services. Politely declined. Appreciated me calling to check on her.

## 2019-03-18 ENCOUNTER — HOSPITAL ENCOUNTER (OUTPATIENT)
Age: 69
Setting detail: OBSERVATION
Discharge: HOME | End: 2019-03-18
Attending: EMERGENCY MEDICINE | Admitting: INTERNAL MEDICINE
Payer: COMMERCIAL

## 2019-03-18 VITALS
HEART RATE: 60 BPM | DIASTOLIC BLOOD PRESSURE: 70 MMHG | RESPIRATION RATE: 17 BRPM | SYSTOLIC BLOOD PRESSURE: 149 MMHG | TEMPERATURE: 98.2 F | WEIGHT: 195 LBS | HEIGHT: 62 IN | BODY MASS INDEX: 35.88 KG/M2 | OXYGEN SATURATION: 89 %

## 2019-03-18 DIAGNOSIS — I48.0 PAROXYSMAL ATRIAL FIBRILLATION WITH RAPID VENTRICULAR RESPONSE (HCC): Primary | ICD-10-CM

## 2019-03-18 PROBLEM — I25.10 CAD (CORONARY ARTERY DISEASE): Status: ACTIVE | Noted: 2019-03-18

## 2019-03-18 LAB
ALBUMIN SERPL-MCNC: 3.7 G/DL (ref 3.2–4.6)
ALBUMIN/GLOB SERPL: 0.9 {RATIO} (ref 1.2–3.5)
ALP SERPL-CCNC: 68 U/L (ref 50–136)
ALT SERPL-CCNC: 21 U/L (ref 12–65)
ANION GAP SERPL CALC-SCNC: 9 MMOL/L (ref 7–16)
AST SERPL-CCNC: 18 U/L (ref 15–37)
ATRIAL RATE: 174 BPM
ATRIAL RATE: 55 BPM
BASOPHILS # BLD: 0.1 K/UL (ref 0–0.2)
BASOPHILS NFR BLD: 0 % (ref 0–2)
BILIRUB SERPL-MCNC: 0.8 MG/DL (ref 0.2–1.1)
BUN SERPL-MCNC: 21 MG/DL (ref 8–23)
CALCIUM SERPL-MCNC: 9.1 MG/DL (ref 8.3–10.4)
CALCULATED P AXIS, ECG09: 39 DEGREES
CALCULATED R AXIS, ECG10: -19 DEGREES
CALCULATED R AXIS, ECG10: -24 DEGREES
CALCULATED T AXIS, ECG11: 147 DEGREES
CALCULATED T AXIS, ECG11: 76 DEGREES
CHLORIDE SERPL-SCNC: 101 MMOL/L (ref 98–107)
CO2 SERPL-SCNC: 30 MMOL/L (ref 21–32)
CREAT SERPL-MCNC: 0.77 MG/DL (ref 0.6–1)
DIAGNOSIS, 93000: NORMAL
DIAGNOSIS, 93000: NORMAL
DIFFERENTIAL METHOD BLD: ABNORMAL
EOSINOPHIL # BLD: 0.1 K/UL (ref 0–0.8)
EOSINOPHIL NFR BLD: 1 % (ref 0.5–7.8)
ERYTHROCYTE [DISTWIDTH] IN BLOOD BY AUTOMATED COUNT: 13.2 % (ref 11.9–14.6)
GLOBULIN SER CALC-MCNC: 4.2 G/DL (ref 2.3–3.5)
GLUCOSE SERPL-MCNC: 100 MG/DL (ref 65–100)
HCT VFR BLD AUTO: 43.7 % (ref 35.8–46.3)
HGB BLD-MCNC: 14.8 G/DL (ref 11.7–15.4)
IMM GRANULOCYTES # BLD AUTO: 0.1 K/UL (ref 0–0.5)
IMM GRANULOCYTES NFR BLD AUTO: 0 % (ref 0–5)
LYMPHOCYTES # BLD: 3.8 K/UL (ref 0.5–4.6)
LYMPHOCYTES NFR BLD: 32 % (ref 13–44)
MAGNESIUM SERPL-MCNC: 1.9 MG/DL (ref 1.8–2.4)
MCH RBC QN AUTO: 31.5 PG (ref 26.1–32.9)
MCHC RBC AUTO-ENTMCNC: 33.9 G/DL (ref 31.4–35)
MCV RBC AUTO: 93 FL (ref 79.6–97.8)
MONOCYTES # BLD: 0.8 K/UL (ref 0.1–1.3)
MONOCYTES NFR BLD: 7 % (ref 4–12)
NEUTS SEG # BLD: 7.1 K/UL (ref 1.7–8.2)
NEUTS SEG NFR BLD: 60 % (ref 43–78)
NRBC # BLD: 0 K/UL (ref 0–0.2)
P-R INTERVAL, ECG05: 152 MS
PLATELET # BLD AUTO: 168 K/UL (ref 150–450)
PMV BLD AUTO: 9.8 FL (ref 9.4–12.3)
POTASSIUM SERPL-SCNC: 3.2 MMOL/L (ref 3.5–5.1)
PROT SERPL-MCNC: 7.9 G/DL (ref 6.3–8.2)
Q-T INTERVAL, ECG07: 286 MS
Q-T INTERVAL, ECG07: 434 MS
QRS DURATION, ECG06: 88 MS
QRS DURATION, ECG06: 88 MS
QTC CALCULATION (BEZET), ECG08: 415 MS
QTC CALCULATION (BEZET), ECG08: 453 MS
RBC # BLD AUTO: 4.7 M/UL (ref 4.05–5.2)
SODIUM SERPL-SCNC: 140 MMOL/L (ref 136–145)
VENTRICULAR RATE, ECG03: 151 BPM
VENTRICULAR RATE, ECG03: 55 BPM
WBC # BLD AUTO: 11.9 K/UL (ref 4.3–11.1)

## 2019-03-18 PROCEDURE — 74011250637 HC RX REV CODE- 250/637: Performed by: NURSE PRACTITIONER

## 2019-03-18 PROCEDURE — 74011000250 HC RX REV CODE- 250: Performed by: EMERGENCY MEDICINE

## 2019-03-18 PROCEDURE — 80053 COMPREHEN METABOLIC PANEL: CPT

## 2019-03-18 PROCEDURE — 99218 HC RM OBSERVATION: CPT

## 2019-03-18 PROCEDURE — 96368 THER/DIAG CONCURRENT INF: CPT | Performed by: EMERGENCY MEDICINE

## 2019-03-18 PROCEDURE — 93005 ELECTROCARDIOGRAM TRACING: CPT | Performed by: NURSE PRACTITIONER

## 2019-03-18 PROCEDURE — 74011000250 HC RX REV CODE- 250: Performed by: NURSE PRACTITIONER

## 2019-03-18 PROCEDURE — 93005 ELECTROCARDIOGRAM TRACING: CPT | Performed by: EMERGENCY MEDICINE

## 2019-03-18 PROCEDURE — 96365 THER/PROPH/DIAG IV INF INIT: CPT | Performed by: EMERGENCY MEDICINE

## 2019-03-18 PROCEDURE — 74011250636 HC RX REV CODE- 250/636: Performed by: EMERGENCY MEDICINE

## 2019-03-18 PROCEDURE — 74011000258 HC RX REV CODE- 258: Performed by: EMERGENCY MEDICINE

## 2019-03-18 PROCEDURE — 83735 ASSAY OF MAGNESIUM: CPT

## 2019-03-18 PROCEDURE — 99285 EMERGENCY DEPT VISIT HI MDM: CPT | Performed by: EMERGENCY MEDICINE

## 2019-03-18 PROCEDURE — 85025 COMPLETE CBC W/AUTO DIFF WBC: CPT

## 2019-03-18 RX ORDER — SODIUM CHLORIDE 0.9 % (FLUSH) 0.9 %
5-40 SYRINGE (ML) INJECTION AS NEEDED
Status: CANCELLED | OUTPATIENT
Start: 2019-03-18

## 2019-03-18 RX ORDER — HALOPERIDOL 5 MG/ML
2.5 INJECTION INTRAMUSCULAR ONCE
Status: DISCONTINUED | OUTPATIENT
Start: 2019-03-18 | End: 2019-03-18

## 2019-03-18 RX ORDER — SODIUM CHLORIDE 0.9 % (FLUSH) 0.9 %
5-40 SYRINGE (ML) INJECTION EVERY 8 HOURS
Status: CANCELLED | OUTPATIENT
Start: 2019-03-18

## 2019-03-18 RX ORDER — POTASSIUM CHLORIDE 20 MEQ/1
40 TABLET, EXTENDED RELEASE ORAL
Status: COMPLETED | OUTPATIENT
Start: 2019-03-18 | End: 2019-03-18

## 2019-03-18 RX ORDER — LISINOPRIL 5 MG/1
5 TABLET ORAL DAILY
Status: CANCELLED | OUTPATIENT
Start: 2019-03-19

## 2019-03-18 RX ORDER — METFORMIN HYDROCHLORIDE 500 MG/1
500 TABLET ORAL 2 TIMES DAILY WITH MEALS
Status: CANCELLED | OUTPATIENT
Start: 2019-03-18

## 2019-03-18 RX ORDER — ISOSORBIDE MONONITRATE 30 MG/1
30 TABLET, EXTENDED RELEASE ORAL DAILY
Status: CANCELLED | OUTPATIENT
Start: 2019-03-19

## 2019-03-18 RX ORDER — ATORVASTATIN CALCIUM 10 MG/1
10 TABLET, FILM COATED ORAL DAILY
Status: CANCELLED | OUTPATIENT
Start: 2019-03-19

## 2019-03-18 RX ORDER — DILTIAZEM HYDROCHLORIDE 5 MG/ML
10 INJECTION INTRAVENOUS
Status: COMPLETED | OUTPATIENT
Start: 2019-03-18 | End: 2019-03-18

## 2019-03-18 RX ORDER — CITALOPRAM 20 MG/1
20 TABLET, FILM COATED ORAL DAILY
Status: CANCELLED | OUTPATIENT
Start: 2019-03-19

## 2019-03-18 RX ORDER — DILTIAZEM HYDROCHLORIDE 5 MG/ML
10 INJECTION INTRAVENOUS ONCE
Status: COMPLETED | OUTPATIENT
Start: 2019-03-18 | End: 2019-03-18

## 2019-03-18 RX ORDER — MAGNESIUM SULFATE HEPTAHYDRATE 40 MG/ML
2 INJECTION, SOLUTION INTRAVENOUS
Status: COMPLETED | OUTPATIENT
Start: 2019-03-18 | End: 2019-03-18

## 2019-03-18 RX ORDER — FUROSEMIDE 40 MG/1
20 TABLET ORAL DAILY
Status: CANCELLED | OUTPATIENT
Start: 2019-03-19

## 2019-03-18 RX ADMIN — DILTIAZEM HYDROCHLORIDE 10 MG: 5 INJECTION INTRAVENOUS at 14:18

## 2019-03-18 RX ADMIN — DILTIAZEM HYDROCHLORIDE 5 MG/HR: 5 INJECTION INTRAVENOUS at 13:28

## 2019-03-18 RX ADMIN — MAGNESIUM SULFATE HEPTAHYDRATE 2 G: 40 INJECTION, SOLUTION INTRAVENOUS at 14:15

## 2019-03-18 RX ADMIN — DILTIAZEM HYDROCHLORIDE 10 MG: 5 INJECTION INTRAVENOUS at 13:03

## 2019-03-18 RX ADMIN — POTASSIUM CHLORIDE 40 MEQ: 20 TABLET, EXTENDED RELEASE ORAL at 14:13

## 2019-03-18 NOTE — ED NOTES
TRANSFER - OUT REPORT:    Verbal report given to Dari(name) on Donta Basket  being transferred to 309(unit) for routine progression of care       Report consisted of patients Situation, Background, Assessment and   Recommendations(SBAR). Information from the following report(s) SBAR, ED Summary, [facility] and Recent Results was reviewed with the receiving nurse. Lines:   Peripheral IV 03/18/19 Right Antecubital (Active)   Site Assessment Clean, dry, & intact 3/18/2019 12:58 PM   Phlebitis Assessment 0 3/18/2019 12:58 PM   Infiltration Assessment 0 3/18/2019 12:58 PM   Dressing Status Clean, dry, & intact 3/18/2019 12:58 PM   Hub Color/Line Status Pink 3/18/2019 12:58 PM       Peripheral IV 03/18/19 Left Wrist (Active)   Site Assessment Clean, dry, & intact 3/18/2019  2:14 PM   Phlebitis Assessment 0 3/18/2019  2:14 PM   Infiltration Assessment 0 3/18/2019  2:14 PM   Dressing Status Clean, dry, & intact 3/18/2019  2:14 PM   Alcohol Cap Used No 3/18/2019  2:14 PM        Opportunity for questions and clarification was provided.       Patient transported with:   Monitor  O2 @ 2 liters  Registered Nurse

## 2019-03-18 NOTE — ED PROVIDER NOTES
[facility] Emergency Department     Elie Chen is a 70 y.o. female seen on 3/18/2019 at 12:59 PM in the [facility] EMERGENCY DEPT in room ER05/05. Chief Complaint   Patient presents with    Palpitations       HPI: 69-year-old female with history of atrial fibrillation presents to the emergency department with a \"A. Fib attack\" had one about 5 weeks ago and several times previous to that    She has been on different medications states they have not worked. Stressful weekend. Mickey Riggs to family member. Was at work today when she started feeling her heart race and skipped. Started about 15-20 minutes prior to her arrival    Some chest pain shortness of breath. No fever no chills. No nausea or vomiting. HPI    Review of Systems: Review of Systems   Constitutional: Negative for activity change, appetite change and fever. HENT: Negative. Eyes: Negative. Respiratory: Positive for chest tightness and shortness of breath. Cardiovascular: Positive for palpitations. Gastrointestinal: Negative. Genitourinary: Negative. Musculoskeletal: Negative. Neurological: Negative. Psychiatric/Behavioral: Negative. Past Medical History: Primary Care Doctor: Meme Dias MD     Past Medical History:   Diagnosis Date    Arrhythmia     episode A. Fib with tachycardia 1/2014 (on a cruise) Converted back to normal sinus rhythm with Amiodarone    Arthritis     in hands    Cellulitis     history of cellulitis right foot, leg (hosp.  8/19/11) and 2nd episode in 1/2014 (not hospitalized);turned into ulcer on toe     Depression     Diabetes ([facility] dx 2010    type 2, oral med, avg fbs 130-170, notices s/s hypoglycemia at 60, unsure last HA1C    Diverticulosis of colon (without mention of hemorrhage)     Edema extremities     chronic (treated with daily Furosemide)    Foot ulcer (HCC)     GERD (gastroesophageal reflux disease)     occ. episode with tomato-type products, relieved with rolaids or tums    Hammer toe     Hypercholesterolemia     no medication    Hypertension     controlled with meds    Neuropathy     in bilateral feet; no medication    Obese 14    BMI 39.1    PUD (peptic ulcer disease) 1971    Thromboembolus ([address]     right leg     Past Surgical History:   Procedure Laterality Date    ABDOMEN SURGERY PROC UNLISTED  2011    drainage abdominal wall abscess    HX APPENDECTOMY  1968    HX CARPAL TUNNEL RELEASE Right     HX  SECTION      HX LAP CHOLECYSTECTOMY      HX OOPHORECTOMY  early 's    Rt ovary removed. Left ovary intact    HX ORTHOPAEDIC      left foot~pt denies    HX OTHER SURGICAL  2007    abscess drained~pt states no    HX TUBAL LIGATION       Social History     Socioeconomic History    Marital status:      Spouse name: Not on file    Number of children: Not on file    Years of education: Not on file    Highest education level: Not on file   Tobacco Use    Smoking status: Never Smoker    Smokeless tobacco: Never Used   Substance and Sexual Activity    Alcohol use: No    Drug use: No   Other Topics Concern    Seat Belt Yes   Social History Narrative    Has PCP Dr Sukumar Wolff in [facility]    Lives with  at home         Prior to Admission Medications   Prescriptions Last Dose Informant Patient Reported? Taking? ASCORBATE CALCIUM (VITAMIN C PO)   Yes No   Sig: Take 1 Tab by mouth every morning. BIOTIN PO   Yes No   Sig: Take 500 mg by mouth daily. FISH OIL/BORAGE/FLAX/OM3,6,9#1 (OMEGA 3-6-9 COMPLEX PO)   Yes No   Sig: Take 1 Tab by mouth daily. OTHER   Yes No   Sig: Berbarine 500mg daily   apixaban (ELIQUIS) 5 mg tablet   No No   Sig: Take 1 Tab by mouth two (2) times a day. atorvastatin (LIPITOR) 10 mg tablet   No No   Sig: Take 1 Tab by mouth daily.    azithromycin (ZITHROMAX Z-BIJAL) 250 mg tablet   No No   Sig: Take 2 tabs po today then 1 tab po daily   b complex vitamins tablet   Yes No   Sig: Take 1 Tab by mouth daily. benazepril (LOTENSIN) 20 mg tablet   Yes No   Sig: Take 20 mg by mouth daily. cholecalciferol, VITAMIN D3, (VITAMIN D3) 5,000 unit tab tablet   Yes No   Sig: Take 5,000 Units by mouth every morning. citalopram (CELEXA) 20 mg tablet   Yes No   Sig: Take 20 mg by mouth daily. dilTIAZem CD (CARDIZEM CD) 120 mg ER capsule   No No   Sig: Take 1 Cap by mouth daily. dronedarone (MULTAQ) tab tablet   Yes No   Sig: Take 400 mg by mouth two (2) times daily (with meals). Samples given   furosemide (LASIX) 20 mg tablet   Yes No   Sig: Take 20 mg by mouth daily. glucose blood VI test strips (CONTOUR NEXT STRIPS) strip   No No   Sig: Test blood sugars once daily   isosorbide mononitrate ER (IMDUR) 30 mg tablet   No No   Sig: Take 1 Tab by mouth daily. metFORMIN (GLUCOPHAGE) 500 mg tablet   No No   Sig: Take 1 Tab by mouth two (2) times daily (with meals). therapeutic multivitamin [facility]) tablet   Yes No   Sig: Take 1 Tab by mouth daily. Facility-Administered Medications: None     Allergies   Allergen Reactions    Amoxicillin Rash and Other (comments)     Made her feel \"weird\"    Doxycycline Nausea and Vomiting    Flecainide Palpitations    Pravastatin Other (comments)     Body aches         Physical Exam:  Nursing documentation reviewed. Vitals:    03/18/19 1257 03/18/19 1303   BP: 178/83 131/72   Pulse: (!) 163 (!) 161   Resp: 16    Temp: 98.2 °F (36.8 °C)    SpO2: 93%    Vital signs were reviewed. Physical Exam   Constitutional: She is oriented to person, place, and time. She appears well-developed and well-nourished. No distress. HENT:   Head: Normocephalic and atraumatic. Eyes: Pupils are equal, round, and reactive to light. Cardiovascular:   Irregularly irregular   Pulmonary/Chest: Breath sounds normal.   Abdominal: Soft. Bowel sounds are normal.   Neurological: She is alert and oriented to person, place, and time. Skin: Skin is warm and dry.    Psychiatric: She has a normal mood and affect. Her behavior is normal.   Nursing note and vitals reviewed. MEDICAL DECISION MAKING:  MDM  Number of Diagnoses or Management Options  Diagnosis management comments: 50-year-old female history of atrial fibrillation presents to the emergency department with sudden onset of palpitations. She is irregularly irregular. Heart rate is 150-170    We will start her on Cardizem drip after a bolus. IV fluids. Reassess       Amount and/or Complexity of Data Reviewed  Clinical lab tests: ordered  Tests in the medicine section of CPT®: ordered          ED Evaluation     Labs:    Recent Results (from the past 24 hour(s))   EKG, 12 LEAD, INITIAL    Collection Time: 03/18/19 12:58 PM   Result Value Ref Range    Ventricular Rate 151 BPM    Atrial Rate 174 BPM    QRS Duration 88 ms    Q-T Interval 286 ms    QTC Calculation (Bezet) 453 ms    Calculated R Axis -24 degrees    Calculated T Axis 147 degrees    Diagnosis       !! AGE AND GENDER SPECIFIC ECG ANALYSIS !! Atrial fibrillation with rapid ventricular response with premature   ventricular or aberrantly conducted complexes  Marked ST abnormality, possible inferior subendocardial injury  Abnormal ECG  When compared with ECG of 27-FEB-2019 07:50,  Significant changes have occurred     CBC WITH AUTOMATED DIFF    Collection Time: 03/18/19  1:03 PM   Result Value Ref Range    WBC 11.9 (H) 4.3 - 11.1 K/uL    RBC 4.70 4.05 - 5.2 M/uL    HGB 14.8 11.7 - 15.4 g/dL    HCT 43.7 35.8 - 46.3 %    MCV 93.0 79.6 - 97.8 FL    MCH 31.5 26.1 - 32.9 PG    MCHC 33.9 31.4 - 35.0 g/dL    RDW 13.2 11.9 - 14.6 %    PLATELET 975 876 - 546 K/uL    MPV 9.8 9.4 - 12.3 FL    ABSOLUTE NRBC 0.00 0.0 - 0.2 K/uL    DF AUTOMATED      NEUTROPHILS 60 43 - 78 %    LYMPHOCYTES 32 13 - 44 %    MONOCYTES 7 4.0 - 12.0 %    EOSINOPHILS 1 0.5 - 7.8 %    BASOPHILS 0 0.0 - 2.0 %    IMMATURE GRANULOCYTES 0 0.0 - 5.0 %    ABS. NEUTROPHILS 7.1 1.7 - 8.2 K/UL    ABS.  LYMPHOCYTES 3.8 0.5 - 4.6 K/UL    ABS. MONOCYTES 0.8 0.1 - 1.3 K/UL    ABS. EOSINOPHILS 0.1 0.0 - 0.8 K/UL    ABS. BASOPHILS 0.1 0.0 - 0.2 K/UL    ABS. IMM. GRANS. 0.1 0.0 - 0.5 K/UL   METABOLIC PANEL, COMPREHENSIVE    Collection Time: 03/18/19  1:03 PM   Result Value Ref Range    Sodium 140 136 - 145 mmol/L    Potassium 3.2 (L) 3.5 - 5.1 mmol/L    Chloride 101 98 - 107 mmol/L    CO2 30 21 - 32 mmol/L    Anion gap 9 7 - 16 mmol/L    Glucose 100 65 - 100 mg/dL    BUN 21 8 - 23 MG/DL    Creatinine 0.77 0.6 - 1.0 MG/DL    GFR est AA >60 >60 ml/min/1.73m2    GFR est non-AA >60 >60 ml/min/1.73m2    Calcium 9.1 8.3 - 10.4 MG/DL    Bilirubin, total 0.8 0.2 - 1.1 MG/DL    ALT (SGPT) 21 12 - 65 U/L    AST (SGOT) 18 15 - 37 U/L    Alk.  phosphatase 68 50 - 136 U/L    Protein, total 7.9 6.3 - 8.2 g/dL    Albumin 3.7 3.2 - 4.6 g/dL    Globulin 4.2 (H) 2.3 - 3.5 g/dL    A-G Ratio 0.9 (L) 1.2 - 3.5       Reviewed and interpreted by me    ===============================================  ED ECG Interpretation (in the absence of a cardiologist)  Rhythm: Atrial Fibrillation At 151  ST Segments: ST depression noted in 2-3 aVF  Rate related changes  Wilfrido Chavez MD; 3/18/2019 @1:01 PM===========================================      Orders Placed This Encounter    CBC WITH AUTOMATED DIFF    METABOLIC PANEL,COMP.    MAGNESIUM    POC URINE DIPSTICK    EKG 12 LEAD INITIAL    INSERT PERIPHERAL IV ONE TIME STAT    dilTIAZem (CARDIZEM) injection 10 mg    DISCONTD: dilTIAZem (CARDIZEM) 100 mg in 0.9% sodium chloride (MBP/ADV) 100 mL infusion    dilTIAZem (CARDIZEM) 125 mg in dextrose 5% 125 mL infusion    magnesium sulfate 2 g/50 ml IVPB (premix or compounded)       Medications   dilTIAZem (CARDIZEM) 125 mg in dextrose 5% 125 mL infusion (5 mg/hr IntraVENous New Bag 3/18/19 1328)   magnesium sulfate 2 g/50 ml IVPB (premix or compounded) (not administered)   dilTIAZem (CARDIZEM) injection 10 mg (10 mg IntraVENous Given 3/18/19 1303) ________________________________________________________  Procedures    ======================================================  ASSESSMENT: 69-year-old female with recurrent paroxysmal atrial fibrillation    Dispo/Plan:    Consult cardiology  Condition:  Ted Nieves MD; 3/18/2019 @12:59 PM ================================

## 2019-03-18 NOTE — ED NOTES
Dr. Luciana Montero in room with patient.  Patient states that she would be discharged, waiting for confirmation from Dr. Luciana Montero.

## 2019-09-09 ENCOUNTER — HOSPITAL ENCOUNTER (OUTPATIENT)
Dept: MRI IMAGING | Age: 69
Discharge: HOME | End: 2019-09-09
Attending: PODIATRIST
Payer: COMMERCIAL

## 2019-09-09 DIAGNOSIS — L97.511 RIGHT FOOT ULCER, LIMITED TO BREAKDOWN OF SKIN (HCC): ICD-10-CM

## 2019-09-09 DIAGNOSIS — E11.621 TYPE 2 DIABETES MELLITUS WITH FOOT ULCER (HCC): ICD-10-CM

## 2019-09-09 DIAGNOSIS — L97.509 TYPE 2 DIABETES MELLITUS WITH FOOT ULCER (HCC): ICD-10-CM

## 2019-09-09 PROCEDURE — A9575 INJ GADOTERATE MEGLUMI 0.1ML: HCPCS | Performed by: PODIATRIST

## 2019-09-09 PROCEDURE — 73718 MRI LOWER EXTREMITY W/O DYE: CPT

## 2019-09-09 PROCEDURE — 73720 MRI LWR EXTREMITY W/O&W/DYE: CPT

## 2019-09-09 PROCEDURE — 74011250636 HC RX REV CODE- 250/636: Performed by: PODIATRIST

## 2019-09-10 PROCEDURE — 74011250636 HC RX REV CODE- 250/636: Performed by: PODIATRIST

## 2019-09-10 PROCEDURE — A9575 INJ GADOTERATE MEGLUMI 0.1ML: HCPCS | Performed by: PODIATRIST

## 2019-09-10 RX ORDER — GADOTERATE MEGLUMINE 376.9 MG/ML
18 INJECTION INTRAVENOUS
Status: COMPLETED | OUTPATIENT
Start: 2019-09-10 | End: 2019-09-10

## 2019-09-10 RX ORDER — SODIUM CHLORIDE 0.9 % (FLUSH) 0.9 %
10 SYRINGE (ML) INJECTION
Status: COMPLETED | OUTPATIENT
Start: 2019-09-10 | End: 2019-09-10

## 2019-09-10 RX ADMIN — GADOTERATE MEGLUMINE 18 ML: 376.9 INJECTION INTRAVENOUS at 11:52

## 2019-09-10 RX ADMIN — Medication 10 ML: at 11:52

## 2020-01-30 PROBLEM — I25.118 CORONARY ARTERY DISEASE WITH STABLE ANGINA PECTORIS (HCC): Status: ACTIVE | Noted: 2020-01-30

## 2020-03-06 ENCOUNTER — HOSPITAL ENCOUNTER (OUTPATIENT)
Dept: MAMMOGRAPHY | Age: 70
Discharge: HOME | End: 2020-03-06
Attending: INTERNAL MEDICINE
Payer: MEDICARE

## 2020-03-06 DIAGNOSIS — Z12.31 VISIT FOR SCREENING MAMMOGRAM: ICD-10-CM

## 2020-03-06 PROCEDURE — 77067 SCR MAMMO BI INCL CAD: CPT

## 2022-03-18 PROBLEM — I48.91 ATRIAL FIBRILLATION (HCC): Status: ACTIVE | Noted: 2019-02-03

## 2022-03-19 PROBLEM — I25.10 CAD (CORONARY ARTERY DISEASE): Status: ACTIVE | Noted: 2019-03-18

## 2022-03-19 PROBLEM — E66.01 SEVERE OBESITY (HCC): Status: ACTIVE | Noted: 2019-02-05

## 2022-03-19 PROBLEM — I25.118 CORONARY ARTERY DISEASE WITH STABLE ANGINA PECTORIS (HCC): Status: ACTIVE | Noted: 2020-01-30

## 2022-05-05 ENCOUNTER — HOSPITAL ENCOUNTER (OUTPATIENT)
Dept: MAMMOGRAPHY | Age: 72
Discharge: HOME | End: 2022-05-05
Attending: INTERNAL MEDICINE
Payer: MEDICARE

## 2022-05-05 DIAGNOSIS — Z13.820 SCREENING FOR OSTEOPOROSIS: ICD-10-CM

## 2022-05-05 DIAGNOSIS — Z78.0 POSTMENOPAUSAL STATUS (AGE-RELATED) (NATURAL): ICD-10-CM

## 2022-05-05 DIAGNOSIS — Z12.31 BREAST CANCER SCREENING BY MAMMOGRAM: ICD-10-CM

## 2022-05-05 PROCEDURE — 77067 SCR MAMMO BI INCL CAD: CPT

## 2022-05-05 PROCEDURE — 77080 DXA BONE DENSITY AXIAL: CPT

## 2022-06-28 ENCOUNTER — OFFICE VISIT (OUTPATIENT)
Dept: CARDIOLOGY CLINIC | Age: 72
End: 2022-06-28
Payer: MEDICARE

## 2022-06-28 VITALS
DIASTOLIC BLOOD PRESSURE: 68 MMHG | SYSTOLIC BLOOD PRESSURE: 120 MMHG | HEART RATE: 63 BPM | HEIGHT: 59 IN | BODY MASS INDEX: 37.29 KG/M2 | WEIGHT: 185 LBS

## 2022-06-28 DIAGNOSIS — I48.91 ATRIAL FIBRILLATION, UNSPECIFIED TYPE (HCC): ICD-10-CM

## 2022-06-28 DIAGNOSIS — I25.10 CORONARY ARTERY DISEASE INVOLVING NATIVE HEART WITHOUT ANGINA PECTORIS, UNSPECIFIED VESSEL OR LESION TYPE: Primary | ICD-10-CM

## 2022-06-28 DIAGNOSIS — I10 HYPERTENSION, UNSPECIFIED TYPE: ICD-10-CM

## 2022-06-28 PROCEDURE — 99214 OFFICE O/P EST MOD 30 MIN: CPT | Performed by: INTERNAL MEDICINE

## 2022-06-28 PROCEDURE — G8427 DOCREV CUR MEDS BY ELIG CLIN: HCPCS | Performed by: INTERNAL MEDICINE

## 2022-06-28 PROCEDURE — 4004F PT TOBACCO SCREEN RCVD TLK: CPT | Performed by: INTERNAL MEDICINE

## 2022-06-28 PROCEDURE — G8417 CALC BMI ABV UP PARAM F/U: HCPCS | Performed by: INTERNAL MEDICINE

## 2022-06-28 PROCEDURE — 1090F PRES/ABSN URINE INCON ASSESS: CPT | Performed by: INTERNAL MEDICINE

## 2022-06-28 PROCEDURE — G8399 PT W/DXA RESULTS DOCUMENT: HCPCS | Performed by: INTERNAL MEDICINE

## 2022-06-28 PROCEDURE — 3017F COLORECTAL CA SCREEN DOC REV: CPT | Performed by: INTERNAL MEDICINE

## 2022-06-28 PROCEDURE — 1123F ACP DISCUSS/DSCN MKR DOCD: CPT | Performed by: INTERNAL MEDICINE

## 2022-06-28 PROCEDURE — 93000 ELECTROCARDIOGRAM COMPLETE: CPT | Performed by: INTERNAL MEDICINE

## 2022-06-28 RX ORDER — BENAZEPRIL HYDROCHLORIDE 20 MG/1
20 TABLET ORAL DAILY
Qty: 90 TABLET | Refills: 3 | Status: CANCELLED | OUTPATIENT
Start: 2022-06-28

## 2022-06-28 RX ORDER — ISOSORBIDE MONONITRATE 30 MG/1
30 TABLET, EXTENDED RELEASE ORAL DAILY
Qty: 90 TABLET | Refills: 3 | Status: CANCELLED | OUTPATIENT
Start: 2022-06-28

## 2022-06-28 RX ORDER — EZETIMIBE 10 MG/1
10 TABLET ORAL DAILY
Qty: 90 TABLET | Refills: 3 | Status: CANCELLED | OUTPATIENT
Start: 2022-06-28

## 2022-06-28 RX ORDER — ATORVASTATIN CALCIUM 10 MG/1
10 TABLET, FILM COATED ORAL DAILY
Qty: 90 TABLET | Refills: 3 | Status: CANCELLED | OUTPATIENT
Start: 2022-06-28

## 2022-06-28 RX ORDER — EZETIMIBE 10 MG/1
10 TABLET ORAL DAILY
Qty: 30 TABLET | Refills: 3 | Status: SHIPPED | OUTPATIENT
Start: 2022-06-28 | End: 2022-10-11

## 2022-06-28 RX ORDER — FUROSEMIDE 20 MG/1
20 TABLET ORAL DAILY
Qty: 90 TABLET | Refills: 3 | Status: CANCELLED | OUTPATIENT
Start: 2022-06-28

## 2022-06-28 ASSESSMENT — ENCOUNTER SYMPTOMS
EYE PAIN: 0
APHONIA: 0
ABDOMINAL PAIN: 0
STRIDOR: 0
COUGH: 0
NAIL CHANGES: 0

## 2022-06-28 NOTE — PROGRESS NOTES
[facility], [address] [place] [address], [address] [place] [address], [address] [place] [address]  PHONE: [phone]    SUBJECTIVE:   Aniyah Zepeda is a 66 y.o. female 1950   seen for a follow up visit regarding the following:     Chief Complaint   Patient presents with    Atrial Fibrillation     yearly f/u      History of present illness: 66 y.o. female presented for follow-up 6/28/22 Only two or three afib attacks. A1c up. Interval Hx:   He was seen at [facility] Emergency Department for atrial fibrillation with rapid ventricular response 02/03/2019. Previous history of atrial fibrillation several years before. She had spontaneous cardioversion to sinus rhythm prior to admission. She was able to be discharged from the emergency room. Cardiac History:   1.   01/2014 echocardiogram - normal.    2.   Atrial fibrillation during emergency room visit 02/2019.    3.   02/2019 initiated on Metoprolol and Flecainide. 4.   Stress perfusion study 2's 2019 lateral defect moderate risk and  5. Cath 2019 distical circumflex vessel 2 mm too small for intervention  6. Flecainide change to Multitaq with improvement of symptoms 3/27/2013  7.   7/31/2020 Sinus  Bradycardia WITHIN NORMAL LIMITS      Assessment and Plan:   1. Atrial fibrillation. ·   CHADS VASC score of 3. Risks and benefits of anticoagulation were discussed. On eliquis . ·   Multaq    2. Diabetes. ·  Now off therapy   ·  A1c 7.1 2022   ·   SGL-2   This patient does not have an active medication from one of the medication groupers. 3.   Hyperlipidemia. lipitor 10 mg    · atorvastatin - 10 MG   · Last lipids 4/2022 total cholesterol 215  triglycerides 150  4. Long term use of antiarrhythmic drug. ·   Stated on multaq   ·   no flecainide due to CAD.    5.   CAD controlled med rx   · No sx              Current Outpatient Medications   Medication Sig    TURMERIC PO Take 1 tablet by mouth daily    apixaban (ELIQUIS) 5 MG TABS tablet Take 5 mg by mouth 2 times daily    benazepril (LOTENSIN) 20 MG tablet TAKE 1 TABLET BY MOUTH EVERY DAY    vitamin D3 (CHOLECALCIFEROL) 125 MCG (5000 UT) TABS tablet Take 5,000 Units by mouth    citalopram (CELEXA) 20 MG tablet TAKE 1 TABLET BY MOUTH EVERY DAY EVERY NIGHT    dronedarone hcl (MULTAQ) 400 MG TABS Take 1 tablet twice daily.  furosemide (LASIX) 20 MG tablet TAKE 1 TABLET BY MOUTH EVERY DAY    isosorbide mononitrate (IMDUR) 30 MG extended release tablet Take 30 mg by mouth daily    atorvastatin (LIPITOR) 10 MG tablet Take 10 mg by mouth daily (Patient not taking: Reported on 6/28/2022)     No current facility-administered medications for this visit. Past Medical History, Past Surgical History, Family history, Social History, and Medications were all reviewed with the patient today and updated as necessary. Allergies   Allergen Reactions    Pravastatin Other (See Comments)     Body aches    Amoxicillin Other (See Comments) and Rash     Made her feel \"weird\"    Doxycycline Nausea And Vomiting    Flecainide Palpitations     Past Medical History:   Diagnosis Date    Arrhythmia     episode A. Fib with tachycardia 1/2014 (on a cruise) Converted back to normal sinus rhythm with Amiodarone    Arthritis     in hands    Cellulitis     history of cellulitis right foot, leg (hosp.  8/19/11) and 2nd episode in 1/2014 (not hospitalized);turned into ulcer on toe     Depression     Diabetes ([facility] [address] dx 2010    type 2, oral med, avg fbs 130-170, notices s/s hypoglycemia at 60, unsure last HA1C    Diverticulosis of colon (without mention of hemorrhage)     Edema extremities     chronic (treated with daily Furosemide)    Foot ulcer (HCC)     GERD (gastroesophageal reflux disease)     occ. episode with tomato-type products, relieved with rolaids or tums    Hammer toe     Hypercholesterolemia     no medication    Hypertension     controlled with meds    Neuropathy     in bilateral feet; no medication    Obese 14    BMI 39.1    PUD (peptic ulcer disease) 1971    Severe sepsis(995.92) 2011    Thromboembolus ([address]     right leg     Past Surgical History:   Procedure Laterality Date    APPENDECTOMY  1968    CARPAL TUNNEL RELEASE Right      SECTION  1975    CHOLECYSTECTOMY, LAPAROSCOPIC      OOPHORECTOMY  early     Rt ovary removed. Left ovary intact    ORTHOPEDIC SURGERY      left foot    OTHER SURGICAL HISTORY  2007    abscess drained    DC ABDOMEN SURGERY PROC UNLISTED  2011    drainage abdominal wall abscess    TUBAL LIGATION       Family History   Problem Relation Age of Onset    Stroke Brother     Heart Attack Brother     Heart Disease Brother     Hypertension Brother     Cancer Father         lung (smoker)    Colon Cancer Mother     Cancer Other         mother - colon      Social History     Tobacco Use    Smoking status: Never Smoker    Smokeless tobacco: Never Used   Substance Use Topics    Alcohol use: No       ROS:    Review of Systems   Constitutional: Negative for fever. HENT: Negative for stridor. Eyes: Negative for pain. Cardiovascular: Negative for chest pain. Respiratory: Negative for cough. Endocrine: Negative for cold intolerance. Skin: Negative for nail changes. Musculoskeletal: Negative for arthritis. Gastrointestinal: Negative for abdominal pain. Genitourinary: Negative for dysuria. Neurological: Negative for aphonia. Psychiatric/Behavioral: Negative for altered mental status. Allergic/Immunologic: Negative for hives.            PHYSICAL EXAM:    /68   Pulse 63 Comment: per EKG  Ht 4' 11\" (1.499 m)   Wt 185 lb (83.9 kg)   BMI 37.37 kg/m²        Wt Readings from Last 3 Encounters:   22 187 lb (84.8 kg)   10/26/21 180 lb (81.6 kg)   21 180 lb (81.6 kg)     BP Readings from Last 3 Encounters:   22 102/60   10/26/21 126/61   21 133/65         Physical Exam  Vitals reviewed. HENT:      Head: Normocephalic. Right Ear: External ear normal.      Left Ear: External ear normal.      Nose: Nose normal.   Eyes:      General: No scleral icterus. Pulmonary:      Effort: Pulmonary effort is normal.   Abdominal:      General: There is no distension. Musculoskeletal:      Cervical back: Neck supple. Skin:     General: Skin is warm. Neurological:      Mental Status: She is alert. Mental status is at baseline. Medical problems and test results were reviewed with the patient today. No results found for this or any previous visit (from the past 672 hour(s)). Lab Results   Component Value Date    CHOL 215 04/01/2022    HDL 50 04/01/2022    VLDL 27 04/01/2022              AIDA Loya was seen today for atrial fibrillation. Diagnoses and all orders for this visit:    Coronary artery disease involving native heart without angina pectoris, unspecified vessel or lesion type  -     EKG 12 lead    Atrial fibrillation, unspecified type ([address]  -     EKG 12 lead    Hypertension, unspecified type  -     EKG 12 lead  -     Basic Metabolic Panel; Future    Other orders  -     ezetimibe (ZETIA) 10 MG tablet; Take 1 tablet by mouth daily  -     Discontinue: empagliflozin (JARDIANCE) 25 MG tablet; Take 1 tablet by mouth daily  -     empagliflozin (JARDIANCE) 25 MG tablet; Take 1 tablet by mouth daily  -     apixaban (ELIQUIS) 5 MG TABS tablet; Take 1 tablet by mouth 2 times daily  -     dronedarone hcl (MULTAQ) 400 MG TABS; Take 1 tablet by mouth 2 times daily (with meals) Take 1 tablet twice daily. Return in about 1 year (around 6/28/2023).        Renita Vo MD  6/28/2022  1:01 PM

## 2022-07-19 RX ORDER — ISOSORBIDE MONONITRATE 30 MG/1
30 TABLET, EXTENDED RELEASE ORAL DAILY
Qty: 90 TABLET | Refills: 3 | Status: SHIPPED | OUTPATIENT
Start: 2022-07-19

## 2022-08-15 DIAGNOSIS — I10 ESSENTIAL (PRIMARY) HYPERTENSION: ICD-10-CM

## 2022-08-15 RX ORDER — FUROSEMIDE 20 MG/1
TABLET ORAL
Qty: 90 TABLET | Refills: 3 | Status: SHIPPED | OUTPATIENT
Start: 2022-08-15

## 2022-08-15 RX ORDER — BENAZEPRIL HYDROCHLORIDE 20 MG/1
TABLET ORAL
Qty: 90 TABLET | Refills: 3 | Status: SHIPPED | OUTPATIENT
Start: 2022-08-15

## 2022-10-04 ENCOUNTER — OFFICE VISIT (OUTPATIENT)
Dept: ORTHOPEDIC SURGERY | Age: 72
End: 2022-10-04
Payer: MEDICARE

## 2022-10-04 DIAGNOSIS — G56.02 LEFT CARPAL TUNNEL SYNDROME: Primary | ICD-10-CM

## 2022-10-04 PROCEDURE — 4004F PT TOBACCO SCREEN RCVD TLK: CPT | Performed by: ORTHOPAEDIC SURGERY

## 2022-10-04 PROCEDURE — G8399 PT W/DXA RESULTS DOCUMENT: HCPCS | Performed by: ORTHOPAEDIC SURGERY

## 2022-10-04 PROCEDURE — G8417 CALC BMI ABV UP PARAM F/U: HCPCS | Performed by: ORTHOPAEDIC SURGERY

## 2022-10-04 PROCEDURE — 3017F COLORECTAL CA SCREEN DOC REV: CPT | Performed by: ORTHOPAEDIC SURGERY

## 2022-10-04 PROCEDURE — G8428 CUR MEDS NOT DOCUMENT: HCPCS | Performed by: ORTHOPAEDIC SURGERY

## 2022-10-04 PROCEDURE — 99204 OFFICE O/P NEW MOD 45 MIN: CPT | Performed by: ORTHOPAEDIC SURGERY

## 2022-10-04 PROCEDURE — G8484 FLU IMMUNIZE NO ADMIN: HCPCS | Performed by: ORTHOPAEDIC SURGERY

## 2022-10-04 PROCEDURE — 1090F PRES/ABSN URINE INCON ASSESS: CPT | Performed by: ORTHOPAEDIC SURGERY

## 2022-10-04 PROCEDURE — 1123F ACP DISCUSS/DSCN MKR DOCD: CPT | Performed by: ORTHOPAEDIC SURGERY

## 2022-10-04 NOTE — PROGRESS NOTES
Orthopaedic Hand Surgery Note    Name: Jelani Hernandez  Age: 66 y.o. YOB: 1950  Gender: female  MRN: [id number]    CC: New patient referred for hand numbness    HPI: Patient is a 66 y.o. female right hand dominant with a chief complaint of left hand numbness and tingling in the median nerve distribution. The symptoms have been going on for years but worsening over the past month where her numbness has become more persistent, she did have a right carpal tunnel release by Dr. Stacey Carver years ago and she did well with that. The patient does complain of night wakening and increased symptoms with driving. Evaluation to date has included none. Treatment to date has included braces. The current symptoms are rated a 7/10 and interfere with sleep. ROS/Meds/PSH/PMH/FH/SH: I personally reviewed the patients standard intake form. Pertinents are discussed in the HPI    Physical Examination:  General: Awake and alert. HEENT: Normocephalic, atraumatic  CV/Pulm: Breathing even and unlabored  Skin: No obvious rashes noted. Lymphatic: No obvious evidence of lymphedema or lymphadenopathy    Musculoskeletal:     Examination of the left upper extremity demonstrates Decreased sensation to light touch in the median distribution, normal sensation in ulnar and radial distribution, Positive carpal tunnel compression testing and Phalen testing, cap refill < 5 seconds in all fingers. Inspection reveals no thenar atrophy. Negative Tinel and elbow flexion compression test of the cubital tunnel, negative Tinel over Guyon's canal. Sensation to light touch in the ulnar 2 digits is normal with no intrinsic atrophy/weakness. No tenderness to palpation or masses noted in the forearm. Imaging / Electrodiagnostic Tests:     none    Assessment:   1.  Left carpal tunnel syndrome        CTS-6 Assessment Tool    - Numbness predominantly or exclusively in the median nerve distribution (3.5)  - Nocturnal numbness (4)  -  Positive Phalen's test (5)  -  Positive Tinel sign over the median nerve at the carpal tunnel (4)  Patient has a CTS-6 score over 12 points resulting in a very high probability of carpal tunnel syndrome    Plan:  We discussed the diagnosis and different treatment options. We discussed observation, EMG/NCV, night splinting, cortisone injections and surgical decompression of the carpal canal and the risks and benefits of all were clearly outlined. We discussed the fact that carpal tunnel syndrome is a progressive disease and the vast majority of patients will eventually require surgery to prevent progression including permanent numbness and weakness that can impair hand function in its most severe stage. After discussing in detail the patient elects to proceed with left ultrasound-guided carpal tunnel release. Patient understands risks and benefits of left ultrasound-guided carpal tunnel release including but not limited to nerve injury, vessel injury, infection, failure to achieve desired results and possible need for additional surgery. Patient understands and wishes to proceed with surgery. On Exam:   The patient is alert and oriented; ;   Lung auscultation is clear bilaterally   Heart has RRR without murmurs  . Patient voiced accordance and understanding of the information provided and the formulated plan. All questions were answered to the patient's satisfaction during the encounter.     Suyapa Liang MD  Orthopaedic Surgery  10/04/22  1:33 PM

## 2022-10-04 NOTE — LETTER
Name:     Delphine Martinez  :      1950  Age:        66 y.o. Gender:  female  Chart#:   [id number]        Date:  10/04/22    Requesting Physician:  Pritesh Feng  Contact:  Jeimy Crane    Date of Surgery or Procedure: Oct. 13,2022 Left Ultrasound Guided Carpal Tunnel Rlease    Type of Anesthesia:   TIVA (Local Mac)    Cardiac Clearance Needed:  yes    THE MEDICATION TO BE HELD:   Eliqunelia      Days to Hold:   Per Cardiologist     Comments:    Please fax back ASAP to [phone] or put a note in Epic     Sent To:   Dr. Tana Ling

## 2022-10-05 ENCOUNTER — TELEPHONE (OUTPATIENT)
Dept: ORTHOPEDIC SURGERY | Age: 72
End: 2022-10-05

## 2022-10-05 NOTE — TELEPHONE ENCOUNTER
Called and advised pt I received a response back from Dr. Sukhjinder Casiano her cardiologist about her cardiac clearance for surgery. I advised her per Dr. Sukhjinder Casiano to hold her eliquis for 48 hrs and then she can start back after. Pt acknowledged.

## 2022-10-06 DIAGNOSIS — G56.02 LEFT CARPAL TUNNEL SYNDROME: Primary | ICD-10-CM

## 2022-10-11 ENCOUNTER — OFFICE VISIT (OUTPATIENT)
Dept: INTERNAL MEDICINE CLINIC | Facility: CLINIC | Age: 72
End: 2022-10-11
Payer: MEDICARE

## 2022-10-11 VITALS
RESPIRATION RATE: 16 BRPM | WEIGHT: 184.4 LBS | SYSTOLIC BLOOD PRESSURE: 126 MMHG | HEIGHT: 59 IN | BODY MASS INDEX: 37.17 KG/M2 | DIASTOLIC BLOOD PRESSURE: 62 MMHG | HEART RATE: 58 BPM | TEMPERATURE: 97.5 F

## 2022-10-11 DIAGNOSIS — Z23 NEEDS FLU SHOT: ICD-10-CM

## 2022-10-11 DIAGNOSIS — F32.89 OTHER DEPRESSION: ICD-10-CM

## 2022-10-11 DIAGNOSIS — E55.9 VITAMIN D DEFICIENCY: ICD-10-CM

## 2022-10-11 DIAGNOSIS — I10 ESSENTIAL (PRIMARY) HYPERTENSION: ICD-10-CM

## 2022-10-11 DIAGNOSIS — E11.9 TYPE 2 DIABETES MELLITUS WITHOUT COMPLICATION, WITHOUT LONG-TERM CURRENT USE OF INSULIN (HCC): Primary | ICD-10-CM

## 2022-10-11 LAB
EST. AVERAGE GLUCOSE BLD GHB EST-MCNC: 140 MG/DL
HBA1C MFR BLD: 6.5 % (ref 4.8–5.6)

## 2022-10-11 PROCEDURE — G8484 FLU IMMUNIZE NO ADMIN: HCPCS | Performed by: INTERNAL MEDICINE

## 2022-10-11 PROCEDURE — 1090F PRES/ABSN URINE INCON ASSESS: CPT | Performed by: INTERNAL MEDICINE

## 2022-10-11 PROCEDURE — 90694 VACC AIIV4 NO PRSRV 0.5ML IM: CPT | Performed by: INTERNAL MEDICINE

## 2022-10-11 PROCEDURE — 99214 OFFICE O/P EST MOD 30 MIN: CPT | Performed by: INTERNAL MEDICINE

## 2022-10-11 PROCEDURE — 1123F ACP DISCUSS/DSCN MKR DOCD: CPT | Performed by: INTERNAL MEDICINE

## 2022-10-11 PROCEDURE — 1036F TOBACCO NON-USER: CPT | Performed by: INTERNAL MEDICINE

## 2022-10-11 PROCEDURE — G8427 DOCREV CUR MEDS BY ELIG CLIN: HCPCS | Performed by: INTERNAL MEDICINE

## 2022-10-11 PROCEDURE — G8399 PT W/DXA RESULTS DOCUMENT: HCPCS | Performed by: INTERNAL MEDICINE

## 2022-10-11 PROCEDURE — G0008 ADMIN INFLUENZA VIRUS VAC: HCPCS | Performed by: INTERNAL MEDICINE

## 2022-10-11 PROCEDURE — 3017F COLORECTAL CA SCREEN DOC REV: CPT | Performed by: INTERNAL MEDICINE

## 2022-10-11 PROCEDURE — G8417 CALC BMI ABV UP PARAM F/U: HCPCS | Performed by: INTERNAL MEDICINE

## 2022-10-11 PROCEDURE — 2022F DILAT RTA XM EVC RTNOPTHY: CPT | Performed by: INTERNAL MEDICINE

## 2022-10-11 PROCEDURE — 3051F HG A1C>EQUAL 7.0%<8.0%: CPT | Performed by: INTERNAL MEDICINE

## 2022-10-11 RX ORDER — CITALOPRAM 20 MG/1
TABLET ORAL
Qty: 90 TABLET | Refills: 3 | Status: SHIPPED | OUTPATIENT
Start: 2022-10-11

## 2022-10-11 SDOH — ECONOMIC STABILITY: FOOD INSECURITY: WITHIN THE PAST 12 MONTHS, YOU WORRIED THAT YOUR FOOD WOULD RUN OUT BEFORE YOU GOT MONEY TO BUY MORE.: NEVER TRUE

## 2022-10-11 SDOH — ECONOMIC STABILITY: FOOD INSECURITY: WITHIN THE PAST 12 MONTHS, THE FOOD YOU BOUGHT JUST DIDN'T LAST AND YOU DIDN'T HAVE MONEY TO GET MORE.: NEVER TRUE

## 2022-10-11 ASSESSMENT — PATIENT HEALTH QUESTIONNAIRE - PHQ9
SUM OF ALL RESPONSES TO PHQ QUESTIONS 1-9: 0
2. FEELING DOWN, DEPRESSED OR HOPELESS: 0
1. LITTLE INTEREST OR PLEASURE IN DOING THINGS: 0
SUM OF ALL RESPONSES TO PHQ9 QUESTIONS 1 & 2: 0
SUM OF ALL RESPONSES TO PHQ QUESTIONS 1-9: 0

## 2022-10-11 ASSESSMENT — SOCIAL DETERMINANTS OF HEALTH (SDOH): HOW HARD IS IT FOR YOU TO PAY FOR THE VERY BASICS LIKE FOOD, HOUSING, MEDICAL CARE, AND HEATING?: NOT HARD AT ALL

## 2022-10-11 NOTE — PROGRESS NOTES
10/11/2022 2:43 PM  Location:[facility] [address] [facility]INE  [place]  Patient #:  [id number]  YOB: 1950          YOUR LAST HEMOGLOBIN A1CS:   No results found for: HBA1C, [id number]    YOUR LAST LIPID PROFILE:   Lab Results   Component Value Date/Time    CHOL 215 04/01/2022 11:04 AM    HDL 50 04/01/2022 11:04 AM    VLDL 27 04/01/2022 11:04 AM         Lab Results   Component Value Date/Time    GFRAA 78 03/04/2021 10:27 AM    BUN 16 04/01/2022 11:04 AM     04/01/2022 11:04 AM    K 3.6 04/01/2022 11:04 AM     04/01/2022 11:04 AM    CO2 23 04/01/2022 11:04 AM           History of Present Illness     Chief Complaint   Patient presents with    Depression    Diabetes   Overall Vern Guillen states she is doing well. Her blood sugars have been controlled. Her last hemoglobin A1c was 7.1. She is followed by cardiology for A. fib and is having occasional palpitations a chest.  She is scheduled to have carpal tunnel repair on the left wrist next week. She is up-to-date with eye exams and had a successful cataract procedure bilaterally    Ms. Evelyn Patino is a 66 y.o. female  who presents for office visit      Allergies   Allergen Reactions    Pravastatin Other (See Comments)     Body aches    Amoxicillin Other (See Comments) and Rash     Made her feel \"weird\"    Doxycycline Nausea And Vomiting    Flecainide Palpitations     Past Medical History:   Diagnosis Date    Arrhythmia     episode A. Fib with tachycardia 1/2014 (on a cruise) Converted back to normal sinus rhythm with Amiodarone    Arthritis     in hands    Cellulitis     history of cellulitis right foot, leg (hosp.  8/19/11) and 2nd episode in 1/2014 (not hospitalized);turned into ulcer on toe     Depression     Diabetes ([facility] [address] dx 2010    type 2, oral med, avg fbs 130-170, notices s/s hypoglycemia at 60, unsure last HA1C    Diverticulosis of colon (without mention of hemorrhage)     Edema extremities     chronic (treated with daily Furosemide)    Foot ulcer (HCC)     GERD (gastroesophageal reflux disease)     occ. episode with tomato-type products, relieved with rolaids or tums    Hammer toe     Hypercholesterolemia     no medication    Hypertension     controlled with meds    Neuropathy     in bilateral feet; no medication    Obese 2/18/14    BMI 39.1    PUD (peptic ulcer disease) 1971    Severe sepsis(995.92) 8/19/2011    Thromboembolus ([address]     right leg     Social History     Socioeconomic History    Marital status:      Spouse name: None    Number of children: None    Years of education: None    Highest education level: None   Tobacco Use    Smoking status: Never    Smokeless tobacco: Never   Substance and Sexual Activity    Alcohol use: No    Drug use: No     Types: Prescription, OTC   Social History Narrative    Has PCP Dr Desiree Hagen in [facility]  Lives with  at home         Social Determinants of Health     Financial Resource Strain: Low Risk     Difficulty of Paying Living Expenses: Not hard at all   Food Insecurity: No Food Insecurity    Worried About Running Out of Food in the Last Year: Never true    Lara of Food in the Last Year: Never true     Past Surgical History:   Procedure Laterality Date    APPENDECTOMY  1968    CARPAL TUNNEL RELEASE Right     [address] [facility]      left foot    OTHER SURGICAL HISTORY  2007    abscess drained    OVARY REMOVAL  early 2000's    Rt ovary removed.  Left ovary intact    TX ABDOMEN SURGERY PROC UNLISTED  2/2011    drainage abdominal wall abscess    TUBAL LIGATION       Current Outpatient Medications   Medication Sig Dispense Refill    citalopram (CELEXA) 20 MG tablet TAKE 1 TABLET BY MOUTH EVERY DAY EVERY NIGHT 90 tablet 3    furosemide (LASIX) 20 MG tablet TAKE 1 TABLET BY MOUTH EVERY DAY 90 tablet 3    benazepril (LOTENSIN) 20 MG tablet TAKE 1 TABLET BY MOUTH EVERY DAY 90 tablet 3    isosorbide mononitrate (IMDUR) 30 MG extended release tablet Take 1 tablet by mouth in the morning. 90 tablet 3    empagliflozin (JARDIANCE) 25 MG tablet Take 1 tablet by mouth daily 90 tablet 3    apixaban (ELIQUIS) 5 MG TABS tablet Take 1 tablet by mouth 2 times daily 90 tablet 3    dronedarone hcl (MULTAQ) 400 MG TABS Take 1 tablet by mouth 2 times daily (with meals) Take 1 tablet twice daily. 90 tablet 3    TURMERIC PO Take 1 tablet by mouth daily      atorvastatin (LIPITOR) 10 MG tablet Take 10 mg by mouth daily      vitamin D3 (CHOLECALCIFEROL) 125 MCG (5000 UT) TABS tablet Take 5,000 Units by mouth daily       No current facility-administered medications for this visit.      Health Maintenance   Topic Date Due    Diabetic microalbuminuria test  Never done    Diabetic retinal exam  Never done    Hepatitis C screen  Never done    DTaP/Tdap/Td vaccine (1 - Tdap) Never done    Shingles vaccine (1 of 2) Never done    Pneumococcal 65+ years Vaccine (3 - PPSV23 or PCV20) 01/15/2019    COVID-19 Vaccine (4 - Booster for Pfizer series) 02/28/2022    Depression Monitoring  03/31/2023    Diabetic foot exam  04/01/2023    A1C test (Diabetic or Prediabetic)  04/01/2023    Lipids  04/01/2023    Annual Wellness Visit (AWV)  04/02/2023    Breast cancer screen  05/05/2024    Colorectal Cancer Screen  02/06/2025    DEXA (modify frequency per FRAX score)  Completed    Flu vaccine  Completed    Hepatitis A vaccine  Aged Out    Hib vaccine  Aged Out    Meningococcal (ACWY) vaccine  Aged Out     Family History   Problem Relation Age of Onset    Stroke Brother     Heart Attack Brother     Heart Disease Brother     Hypertension Brother     Cancer Father         lung (smoker)    Colon Cancer Mother     Cancer Other         mother - colon             Review of Systems  Review of Systems    /62 (Site: Left Upper Arm, Position: Sitting, Cuff Size: Large Adult)   Pulse 58   Temp 97.5 °F (36.4 °C) (Temporal)   Resp 16   Ht 4' 11\" (1.499 m)   Wt 184 lb 6.4 oz (83.6 kg)   BMI 37.24 kg/m²       Physical Exam    Physical Exam  Constitutional:       Appearance: Normal appearance. HENT:      Head: Normocephalic and atraumatic. Eyes:      Extraocular Movements: Extraocular movements intact. Pupils: Pupils are equal, round, and reactive to light. Cardiovascular:      Rate and Rhythm: Normal rate and regular rhythm. Heart sounds: Normal heart sounds. No murmur heard. Pulmonary:      Effort: Pulmonary effort is normal.      Breath sounds: Normal breath sounds. Skin:     General: Skin is warm and dry. Neurological:      General: No focal deficit present. Mental Status: She is alert and oriented to person, place, and time. Psychiatric:         Mood and Affect: Mood normal.         Behavior: Behavior normal.         Thought Content: Thought content normal.         Judgment: Judgment normal.       Assessment & Plan    Encounter Diagnoses   Name Primary?  Type 2 diabetes mellitus without complication, without long-term current use of insulin ([facility]) Yes    Needs flu shot     Essential (primary) hypertension     Other depression     Vitamin D deficiency        Current Outpatient Medications   Medication Sig Dispense Refill    citalopram (CELEXA) 20 MG tablet TAKE 1 TABLET BY MOUTH EVERY DAY EVERY NIGHT 90 tablet 3    furosemide (LASIX) 20 MG tablet TAKE 1 TABLET BY MOUTH EVERY DAY 90 tablet 3    benazepril (LOTENSIN) 20 MG tablet TAKE 1 TABLET BY MOUTH EVERY DAY 90 tablet 3    isosorbide mononitrate (IMDUR) 30 MG extended release tablet Take 1 tablet by mouth in the morning. 90 tablet 3    empagliflozin (JARDIANCE) 25 MG tablet Take 1 tablet by mouth daily 90 tablet 3    apixaban (ELIQUIS) 5 MG TABS tablet Take 1 tablet by mouth 2 times daily 90 tablet 3    dronedarone hcl (MULTAQ) 400 MG TABS Take 1 tablet by mouth 2 times daily (with meals) Take 1 tablet twice daily. 90 tablet 3    TURMERIC PO Take 1 tablet by mouth daily      atorvastatin (LIPITOR) 10 MG tablet Take 10 mg by mouth daily      vitamin D3 (CHOLECALCIFEROL) 125 MCG (5000 UT) TABS tablet Take 5,000 Units by mouth daily       No current facility-administered medications for this visit. Orders Placed This Encounter   Procedures    Influenza, FLUAD, (age 72 y+), IM, Preservative Free, 0.5 mL    Basic Metabolic Panel     Standing Status:   Future     Number of Occurrences:   1     Standing Expiration Date:   10/11/2023    Hemoglobin A1C     Standing Status:   Future     Number of Occurrences:   1     Standing Expiration Date:   10/11/2023    Vitamin D 25 Hydroxy     Standing Status:   Future     Number of Occurrences:   1     Standing Expiration Date:   10/11/2023       Medications Discontinued During This Encounter   Medication Reason    ezetimibe (ZETIA) 10 MG tablet LIST CLEANUP    citalopram (CELEXA) 20 MG tablet REORDER       1. Type 2 diabetes mellitus without complication, without long-term current use of insulin (HCC)  Previously well controlled  - Basic Metabolic Panel; Future  - Hemoglobin A1C; Future  - Hemoglobin H6I  - Basic Metabolic Panel    2. Needs flu shot     - Influenza, FLUAD, (age 72 y+), IM, Preservative Free, 0.5 mL    3. Essential (primary) hypertension  Controlled    4. Other depression  Stable on present meds  - citalopram (CELEXA) 20 MG tablet; TAKE 1 TABLET BY MOUTH EVERY DAY EVERY NIGHT  Dispense: 90 tablet; Refill: 3    5. Vitamin D deficiency  On vitamin D supplement  - Vitamin D 25 Hydroxy; Future  - Vitamin D 25 Hydroxy      No follow-up provider specified.         Sumi Joy MD

## 2022-10-12 LAB
25(OH)D3 SERPL-MCNC: 52.4 NG/ML (ref 30–100)
ANION GAP SERPL CALC-SCNC: 7 MMOL/L (ref 2–11)
BUN SERPL-MCNC: 22 MG/DL (ref 8–23)
CALCIUM SERPL-MCNC: 8.7 MG/DL (ref 8.3–10.4)
CHLORIDE SERPL-SCNC: 108 MMOL/L (ref 101–110)
CO2 SERPL-SCNC: 24 MMOL/L (ref 21–32)
CREAT SERPL-MCNC: 0.7 MG/DL (ref 0.6–1)
GLUCOSE SERPL-MCNC: 100 MG/DL (ref 65–100)
POTASSIUM SERPL-SCNC: 3.6 MMOL/L (ref 3.5–5.1)
SODIUM SERPL-SCNC: 139 MMOL/L (ref 133–143)

## 2022-10-12 NOTE — TELEPHONE ENCOUNTER
Rx printed and given to  for signature.      Requested Prescriptions     Signed Prescriptions Disp Refills    empagliflozin (JARDIANCE) 25 MG tablet 90 tablet 3     Sig: Take 1 tablet by mouth daily     Authorizing Provider: Guillermo Verma     Ordering User: Kp Mancini

## 2022-10-14 ENCOUNTER — TELEPHONE (OUTPATIENT)
Dept: ORTHOPEDIC SURGERY | Age: 72
End: 2022-10-14

## 2022-12-02 ENCOUNTER — TELEMEDICINE (OUTPATIENT)
Dept: INTERNAL MEDICINE CLINIC | Facility: CLINIC | Age: 72
End: 2022-12-02
Payer: MEDICARE

## 2022-12-02 ENCOUNTER — TELEPHONE (OUTPATIENT)
Dept: INTERNAL MEDICINE CLINIC | Facility: CLINIC | Age: 72
End: 2022-12-02

## 2022-12-02 DIAGNOSIS — J06.9 UPPER RESPIRATORY TRACT INFECTION, UNSPECIFIED TYPE: Primary | ICD-10-CM

## 2022-12-02 DIAGNOSIS — E11.40 TYPE 2 DIABETES MELLITUS WITH DIABETIC NEUROPATHY, WITHOUT LONG-TERM CURRENT USE OF INSULIN (HCC): ICD-10-CM

## 2022-12-02 DIAGNOSIS — E11.9 TYPE 2 DIABETES MELLITUS WITHOUT COMPLICATION, WITHOUT LONG-TERM CURRENT USE OF INSULIN (HCC): ICD-10-CM

## 2022-12-02 PROCEDURE — 3017F COLORECTAL CA SCREEN DOC REV: CPT | Performed by: INTERNAL MEDICINE

## 2022-12-02 PROCEDURE — G8399 PT W/DXA RESULTS DOCUMENT: HCPCS | Performed by: INTERNAL MEDICINE

## 2022-12-02 PROCEDURE — 1090F PRES/ABSN URINE INCON ASSESS: CPT | Performed by: INTERNAL MEDICINE

## 2022-12-02 PROCEDURE — 2022F DILAT RTA XM EVC RTNOPTHY: CPT | Performed by: INTERNAL MEDICINE

## 2022-12-02 PROCEDURE — 3044F HG A1C LEVEL LT 7.0%: CPT | Performed by: INTERNAL MEDICINE

## 2022-12-02 PROCEDURE — 99213 OFFICE O/P EST LOW 20 MIN: CPT | Performed by: INTERNAL MEDICINE

## 2022-12-02 PROCEDURE — 1123F ACP DISCUSS/DSCN MKR DOCD: CPT | Performed by: INTERNAL MEDICINE

## 2022-12-02 PROCEDURE — G8427 DOCREV CUR MEDS BY ELIG CLIN: HCPCS | Performed by: INTERNAL MEDICINE

## 2022-12-02 RX ORDER — BENZONATATE 200 MG/1
200 CAPSULE ORAL 3 TIMES DAILY PRN
Qty: 30 CAPSULE | Refills: 0 | Status: SHIPPED | OUTPATIENT
Start: 2022-12-02 | End: 2022-12-09

## 2022-12-02 RX ORDER — AZITHROMYCIN 250 MG/1
250 TABLET, FILM COATED ORAL SEE ADMIN INSTRUCTIONS
Qty: 6 TABLET | Refills: 0 | Status: SHIPPED | OUTPATIENT
Start: 2022-12-02 | End: 2022-12-07

## 2022-12-02 NOTE — PROGRESS NOTES
Janora Closs (:  1950) is a Established patient, here for evaluation of the following: This patient states over the past 4 to 5 days she has had cough congestion. She did receive a flu shot last month. She is COVID-negative. Her cough is productive with green sputum. Temperature is 101 today. She denies worsening shortness of breath. Blood pressures 129/59 with a pulse of 62. Assessment & Plan   Below is the assessment and plan developed based on review of pertinent history, physical exam, labs, studies, and medications. 1. Upper respiratory tract infection, unspecified type  -     azithromycin (ZITHROMAX) 250 MG tablet; Take 1 tablet by mouth See Admin Instructions for 5 days 500mg on day 1 followed by 250mg on days 2 - 5, Disp-6 tablet, R-0Normal  -     benzonatate (TESSALON) 200 MG capsule; Take 1 capsule by mouth 3 times daily as needed for Cough, Disp-30 capsule, R-0Normal  2. Type 2 diabetes mellitus without complication, without long-term current use of insulin ([facility] [address]  3. Type 2 diabetes mellitus with diabetic neuropathy, without long-term current use of insulin ([facility])    No follow-ups on file. Subjective   HPI  Review of Systems           Objective   Patient-Reported Vitals  No data recorded     Physical Exam  [INSTRUCTIONS:  \"[x]\" Indicates a positive item  \"[]\" Indicates a negative item  -- DELETE ALL ITEMS NOT EXAMINED]    Constitutional: [x] Appears well-developed and well-nourished [x] No apparent distress      [] Abnormal -     Mental status: [x] Alert and awake  [x] Oriented to person/place/time [x] Able to follow commands    [] Abnormal -        Other pertinent observable physical exam findings:-         On this date 2022 I have spent 15 minutes reviewing previous notes, test results and face to face (virtual) with the patient discussing the diagnosis and importance of compliance with the treatment plan as well as documenting on the day of the visit.     Lisa Almazan Jasvir, was evaluated through a synchronous (real-time) audio-video encounter. The patient (or guardian if applicable) is aware that this is a billable service, which includes applicable co-pays. This Virtual Visit was conducted with patient's (and/or legal guardian's) consent. The visit was conducted pursuant to the emergency declaration under the [facility], [address] [place] [address] authority and the [employer] and [employer] Act. Patient identification was verified, and a caregiver was present when appropriate. The patient was located at Home: [address] [place] [address]. Provider was located at [facility] (Appt Dept): Kary Gallardo [address] [place] [address]. --Tom Clifton MD     The patient is told to check her blood sugars on a daily basis and monitor for any increase over 200.   She is told to go to the emergency room if she has any worsening symptoms such as shortness of breath

## 2022-12-02 NOTE — TELEPHONE ENCOUNTER
COVID-19 Phone Call    Are you experiencing any of the below symptoms? Fever or Chills yes   If yes, what is your temperature? 100.2     Cough? yes  Is it dry or productive? productive   If productive, what color is the mucus? Yellow greenish     Shortness of breath or difficulty Breathing? no   Have you checked your O2 Sats? no   If so what are the readings? no     Fatigue? Yes,      Muscle or Body Aches? Yes, coughing so much     Headaches? Yes from coughing     New loss of taste or smell? no     Sore throat? no     Congestion or runny nose? both      Nausea or Vomiting? no     Diarrhea? Yesterday     When did you start experiencing the above symptoms? On tuesday      Have you had a Covid Test Done? no  If Yes, what where the results no  When did you take the test? no  Was it a Home Test? no     Are you vaccinated? yes  Did you receive the Booster Vaccines?  Yes    Pharmacy [facility]x

## 2023-02-07 ENCOUNTER — TELEPHONE (OUTPATIENT)
Dept: CARDIOLOGY CLINIC | Age: 73
End: 2023-02-07

## 2023-02-07 NOTE — TELEPHONE ENCOUNTER
Patient would like samples of mulitaq since hers have not come in from her pharmacy yet. She would pick them up at any office.

## 2023-02-10 ENCOUNTER — TELEPHONE (OUTPATIENT)
Dept: INTERNAL MEDICINE CLINIC | Facility: CLINIC | Age: 73
End: 2023-02-10

## 2023-02-10 NOTE — TELEPHONE ENCOUNTER
----- Message from Beena Tay sent at 2/10/2023 10:37 AM EST -----  Regarding: Permanent Handicap Tag  I did ask Dr. Vel Ashley if he could issue a permanent tag and his response was that my primary care physician would have to fill out the form since he, Dr. Margaux Gonzales, treats me for the diabetes. Thanks for your help!

## 2023-03-09 ENCOUNTER — OFFICE VISIT (OUTPATIENT)
Dept: INTERNAL MEDICINE CLINIC | Facility: CLINIC | Age: 73
End: 2023-03-09

## 2023-03-09 VITALS
BODY MASS INDEX: 37.09 KG/M2 | TEMPERATURE: 97 F | DIASTOLIC BLOOD PRESSURE: 62 MMHG | RESPIRATION RATE: 18 BRPM | HEART RATE: 54 BPM | WEIGHT: 184 LBS | OXYGEN SATURATION: 97 % | HEIGHT: 59 IN | SYSTOLIC BLOOD PRESSURE: 126 MMHG

## 2023-03-09 DIAGNOSIS — M71.50 BURSITIS DUE TO TRAUMA: Primary | ICD-10-CM

## 2023-03-09 SDOH — ECONOMIC STABILITY: FOOD INSECURITY: WITHIN THE PAST 12 MONTHS, YOU WORRIED THAT YOUR FOOD WOULD RUN OUT BEFORE YOU GOT MONEY TO BUY MORE.: NEVER TRUE

## 2023-03-09 SDOH — ECONOMIC STABILITY: INCOME INSECURITY: HOW HARD IS IT FOR YOU TO PAY FOR THE VERY BASICS LIKE FOOD, HOUSING, MEDICAL CARE, AND HEATING?: NOT HARD AT ALL

## 2023-03-09 SDOH — ECONOMIC STABILITY: HOUSING INSECURITY
IN THE LAST 12 MONTHS, WAS THERE A TIME WHEN YOU DID NOT HAVE A STEADY PLACE TO SLEEP OR SLEPT IN A SHELTER (INCLUDING NOW)?: NO

## 2023-03-09 SDOH — ECONOMIC STABILITY: FOOD INSECURITY: WITHIN THE PAST 12 MONTHS, THE FOOD YOU BOUGHT JUST DIDN'T LAST AND YOU DIDN'T HAVE MONEY TO GET MORE.: NEVER TRUE

## 2023-03-09 ASSESSMENT — ENCOUNTER SYMPTOMS: BACK PAIN: 0

## 2023-03-09 ASSESSMENT — PATIENT HEALTH QUESTIONNAIRE - PHQ9
SUM OF ALL RESPONSES TO PHQ9 QUESTIONS 1 & 2: 0
SUM OF ALL RESPONSES TO PHQ QUESTIONS 1-9: 0
4. FEELING TIRED OR HAVING LITTLE ENERGY: 0
3. TROUBLE FALLING OR STAYING ASLEEP: 0
9. THOUGHTS THAT YOU WOULD BE BETTER OFF DEAD, OR OF HURTING YOURSELF: 0
2. FEELING DOWN, DEPRESSED OR HOPELESS: 0
6. FEELING BAD ABOUT YOURSELF - OR THAT YOU ARE A FAILURE OR HAVE LET YOURSELF OR YOUR FAMILY DOWN: 0
7. TROUBLE CONCENTRATING ON THINGS, SUCH AS READING THE NEWSPAPER OR WATCHING TELEVISION: 0
8. MOVING OR SPEAKING SO SLOWLY THAT OTHER PEOPLE COULD HAVE NOTICED. OR THE OPPOSITE, BEING SO FIGETY OR RESTLESS THAT YOU HAVE BEEN MOVING AROUND A LOT MORE THAN USUAL: 0
SUM OF ALL RESPONSES TO PHQ QUESTIONS 1-9: 0
SUM OF ALL RESPONSES TO PHQ QUESTIONS 1-9: 0
5. POOR APPETITE OR OVEREATING: 0
10. IF YOU CHECKED OFF ANY PROBLEMS, HOW DIFFICULT HAVE THESE PROBLEMS MADE IT FOR YOU TO DO YOUR WORK, TAKE CARE OF THINGS AT HOME, OR GET ALONG WITH OTHER PEOPLE: 0
1. LITTLE INTEREST OR PLEASURE IN DOING THINGS: 0
SUM OF ALL RESPONSES TO PHQ QUESTIONS 1-9: 0

## 2023-03-09 NOTE — PROGRESS NOTES
3/9/2023 3:23 PM  Location:[facility] [address] [facility]  [place]  Patient #:  [id number]  YOB: 1950          YOUR LAST HEMOGLOBIN A1CS:   No results found for: HBA1C, [id number]    YOUR LAST LIPID PROFILE:   Lab Results   Component Value Date/Time    CHOL 215 04/01/2022 11:04 AM    HDL 50 04/01/2022 11:04 AM    VLDL 27 04/01/2022 11:04 AM         Lab Results   Component Value Date/Time    GFRAA 78 03/04/2021 10:27 AM    BUN 22 10/11/2022 02:40 PM     10/11/2022 02:40 PM    K 3.6 10/11/2022 02:40 PM     10/11/2022 02:40 PM    CO2 24 10/11/2022 02:40 PM           History of Present Illness     Chief Complaint   Patient presents with    Joint Swelling     Fluid on left elbow       Ms. George Ward is a 67 y.o. female  who presents for left elbow swelling. Ms. Katya Landeros presents for left elbow effusion. She had a trip and fall into a bush where she hit her left elbow onto the hard ground about a month ago. She had no real pain but subsequently about a week later her left elbow began to swell. The swelling has persisted over the last several weeks. She denies any pain with movement, loss of strength distally, associated neck or back pain. She denies any fevers or chills, redness or warmth to elbow. She has no history of gout. She does have a history of atrial fibrillation and is on chronic oral anticoagulation with Eliquis twice daily. She has not taken any medication for the symptoms. Allergies   Allergen Reactions    Pravastatin Other (See Comments)     Body aches    Amoxicillin Other (See Comments) and Rash     Made her feel \"weird\"    Doxycycline Nausea And Vomiting    Flecainide Palpitations     Past Medical History:   Diagnosis Date    Arrhythmia     episode A. Fib with tachycardia 1/2014 (on a cruise) Converted back to normal sinus rhythm with Amiodarone    Arthritis     in hands    Cellulitis     history of cellulitis right foot, leg (hosp. 8/19/11) and 2nd episode in 1/2014 (not hospitalized);turned into ulcer on toe     Depression     Diabetes ([facility] [address] dx 2010    type 2, oral med, avg fbs 130-170, notices s/s hypoglycemia at 60, unsure last HA1C    Diverticulosis of colon (without mention of hemorrhage)     Edema extremities     chronic (treated with daily Furosemide)    Foot ulcer (HCC)     GERD (gastroesophageal reflux disease)     occ. episode with tomato-type products, relieved with rolaids or tums    Hammer toe     Hypercholesterolemia     no medication    Hypertension     controlled with meds    Neuropathy     in bilateral feet; no medication    Obese 2/18/14    BMI 39.1    PUD (peptic ulcer disease) 1971    Severe sepsis(995.92) 8/19/2011    Thromboembolus ([facility] [address]     right leg     Social History     Socioeconomic History    Marital status:      Spouse name: None    Number of children: None    Years of education: None    Highest education level: None   Tobacco Use    Smoking status: Never    Smokeless tobacco: Never   Substance and Sexual Activity    Alcohol use: No    Drug use: No     Types: Prescription, OTC   Social History Narrative    Has PCP Dr Chaitanya Barbour in [facility]  Lives with  at home         Social Determinants of Health     Financial Resource Strain: Low Risk     Difficulty of Paying Living Expenses: Not hard at all   Food Insecurity: No Food Insecurity    Worried About Running Out of Food in the Last Year: Never true    Ran Out of Food in the Last Year: Never true   Transportation Needs: Unknown    Lack of Transportation (Non-Medical): No   Housing Stability: Unknown    Unstable Housing in the Last Year: No     Past Surgical History:   Procedure Laterality Date    [address]      left foot    OTHER SURGICAL HISTORY  2007    abscess drained    OVARY REMOVAL  early 2000's    Rt ovary removed.  Left ovary intact    CO UNLISTED PROCEDURE ABDOMEN PERITONEUM & OMENTUM  2/2011    drainage abdominal wall abscess    TUBAL LIGATION       Current Outpatient Medications   Medication Sig Dispense Refill    empagliflozin (JARDIANCE) 25 MG tablet Take 1 tablet by mouth daily 90 tablet 3    citalopram (CELEXA) 20 MG tablet TAKE 1 TABLET BY MOUTH EVERY DAY EVERY NIGHT 90 tablet 3    benazepril (LOTENSIN) 20 MG tablet TAKE 1 TABLET BY MOUTH EVERY DAY 90 tablet 3    isosorbide mononitrate (IMDUR) 30 MG extended release tablet Take 1 tablet by mouth in the morning. 90 tablet 3    apixaban (ELIQUIS) 5 MG TABS tablet Take 1 tablet by mouth 2 times daily 90 tablet 3    dronedarone hcl (MULTAQ) 400 MG TABS Take 1 tablet by mouth 2 times daily (with meals) Take 1 tablet twice daily. 90 tablet 3    vitamin D3 (CHOLECALCIFEROL) 125 MCG (5000 UT) TABS tablet Take by mouth daily      furosemide (LASIX) 20 MG tablet TAKE 1 TABLET BY MOUTH EVERY DAY (Patient not taking: Reported on 3/9/2023) 90 tablet 3    TURMERIC PO Take 1 tablet by mouth daily (Patient not taking: Reported on 3/9/2023)      atorvastatin (LIPITOR) 10 MG tablet Take 10 mg by mouth daily (Patient not taking: Reported on 3/9/2023)       No current facility-administered medications for this visit.      Health Maintenance   Topic Date Due    Diabetic Alb to Cr ratio (uACR) test  Never done    Hepatitis C screen  Never done    DTaP/Tdap/Td vaccine (1 - Tdap) Never done    Shingles vaccine (1 of 2) Never done    Pneumococcal 65+ years Vaccine (3 - PPSV23 if available, else PCV20) 01/15/2019    COVID-19 Vaccine (4 - Booster for Pfizer series) 12/23/2021    Diabetic foot exam  04/01/2023    Lipids  04/01/2023    Annual Wellness Visit (AWV)  04/02/2023    Diabetic retinal exam  10/10/2023    A1C test (Diabetic or Prediabetic)  10/11/2023    Depression Monitoring  10/11/2023    GFR test (Diabetes, CKD 3-4, OR last GFR 15-59)  10/11/2023    Breast cancer screen  05/05/2024    Colorectal Cancer Screen  02/06/2025    DEXA (modify frequency per FRAX score)  Completed    Flu vaccine  Completed    Hepatitis A vaccine  Aged Out    Hib vaccine  Aged Out    Meningococcal (ACWY) vaccine  Aged Out     Family History   Problem Relation Age of Onset    Stroke Brother     Heart Attack Brother     Heart Disease Brother     Hypertension Brother     Cancer Father         lung (smoker)    Colon Cancer Mother     Cancer Other         mother - colon             Review of Systems  Review of Systems   Constitutional:  Negative for chills and fever. Cardiovascular:  Negative for chest pain. Musculoskeletal:  Positive for joint swelling (left elbow). Negative for arthralgias, back pain and neck pain. Skin:  Negative for rash and wound. All other systems reviewed and are negative. /62 (Site: Right Upper Arm, Position: Sitting, Cuff Size: Large Adult)   Pulse 54   Temp 97 °F (36.1 °C) (Temporal)   Resp 18   Ht 4' 11\" (1.499 m)   Wt 184 lb (83.5 kg)   SpO2 97% Comment: ra  BMI 37.16 kg/m²       Physical Exam    Physical Exam  Vitals and nursing note reviewed. Constitutional:       General: She is not in acute distress. Appearance: She is not ill-appearing, toxic-appearing or diaphoretic. HENT:      Mouth/Throat:      Mouth: Mucous membranes are moist.   Cardiovascular:      Rate and Rhythm: Regular rhythm. Heart sounds: Murmur heard. Pulmonary:      Effort: No respiratory distress. Breath sounds: No wheezing, rhonchi or rales. Musculoskeletal:      Left shoulder: Normal.      Left upper arm: Normal.      Left elbow: Effusion present. No deformity. Normal range of motion. No tenderness. Left wrist: Normal pulse. Right lower leg: No edema. Left lower leg: No edema. Comments: Effusion noted at olecranon process. Compartments soft, no erythema or warmth. Small fluctuant area noted to olecranon.  Distal pulses intact, full ROM   Neurological:      Mental Status: She is oriented to person, place, and time.         Assessment & Plan    Current Outpatient Medications   Medication Sig Dispense Refill    empagliflozin (JARDIANCE) 25 MG tablet Take 1 tablet by mouth daily 90 tablet 3    citalopram (CELEXA) 20 MG tablet TAKE 1 TABLET BY MOUTH EVERY DAY EVERY NIGHT 90 tablet 3    benazepril (LOTENSIN) 20 MG tablet TAKE 1 TABLET BY MOUTH EVERY DAY 90 tablet 3    isosorbide mononitrate (IMDUR) 30 MG extended release tablet Take 1 tablet by mouth in the morning. 90 tablet 3    apixaban (ELIQUIS) 5 MG TABS tablet Take 1 tablet by mouth 2 times daily 90 tablet 3    dronedarone hcl (MULTAQ) 400 MG TABS Take 1 tablet by mouth 2 times daily (with meals) Take 1 tablet twice daily. 90 tablet 3    vitamin D3 (CHOLECALCIFEROL) 125 MCG (5000 UT) TABS tablet Take by mouth daily      furosemide (LASIX) 20 MG tablet TAKE 1 TABLET BY MOUTH EVERY DAY (Patient not taking: Reported on 3/9/2023) 90 tablet 3    TURMERIC PO Take 1 tablet by mouth daily (Patient not taking: Reported on 3/9/2023)      atorvastatin (LIPITOR) 10 MG tablet Take 10 mg by mouth daily (Patient not taking: Reported on 3/9/2023)       No current facility-administered medications for this visit.       1. Bursitis due to trauma  - 20610 - DE DRAIN/INJECT LARGE JOINT/BURSA  Procedure:  left  elbow arthrocentesis performed, aseptic technique used, site cleansed with betadine, 2 cc  Lidocaine 1% 1mL injected. With the elbow flexed, 22ga needle was inserted at the olecranon at point of maximal swelling. 5 cc dark sanguinous drainage aspirated.  Distal n/v intact pre and post-procedure. Pt. tolerated procedure well.    Bulky dressing placed as well as snug ace wrap. Will follow-up after x-ray, anticipate orthopedic referral for recurrent effusion or for abnormal x-rays findings.    - XR ELBOW LEFT (MIN 3 VIEWS); Future      Zeina Martinez, APRN - CNP

## 2023-03-27 ENCOUNTER — TELEPHONE (OUTPATIENT)
Dept: INTERNAL MEDICINE CLINIC | Facility: CLINIC | Age: 73
End: 2023-03-27

## 2023-03-27 NOTE — TELEPHONE ENCOUNTER
I sent the message below via my chart, patient has not read the following message, please relay. Thanks! Good morning, Ms. Tay,  How is your elbow feeling? Hoping you are better. Please let us know if you have any pain or recurrent swelling. Here if you need anything!   Deepthi Avelar, [address] [facility]

## 2023-03-28 NOTE — TELEPHONE ENCOUNTER
I ordered the xray at her appointment. Just need to fax where she wants to go if she has lost the paper.  Thanks

## 2023-04-11 PROBLEM — G47.33 OBSTRUCTIVE SLEEP APNEA: Status: ACTIVE | Noted: 2023-04-11

## 2023-04-11 PROBLEM — E66.01 SEVERE OBESITY (BMI 35.0-39.9) WITH COMORBIDITY (HCC): Status: ACTIVE | Noted: 2023-04-11

## 2023-04-11 PROBLEM — G56.02 LEFT CARPAL TUNNEL SYNDROME: Status: RESOLVED | Noted: 2022-10-04 | Resolved: 2023-04-11

## 2023-04-11 PROBLEM — D68.69 SECONDARY HYPERCOAGULABLE STATE (HCC): Status: ACTIVE | Noted: 2023-04-11

## 2023-04-11 PROBLEM — L97.509 NON-PRESSURE CHRONIC ULCER OF OTHER PART OF UNSPECIFIED FOOT WITH UNSPECIFIED SEVERITY (HCC): Status: RESOLVED | Noted: 2023-04-11 | Resolved: 2023-04-11

## 2023-04-11 PROBLEM — F33.1 MAJOR DEPRESSIVE DISORDER, RECURRENT, MODERATE (HCC): Status: ACTIVE | Noted: 2023-04-11

## 2023-04-11 PROBLEM — L97.509 NON-PRESSURE CHRONIC ULCER OF OTHER PART OF UNSPECIFIED FOOT WITH UNSPECIFIED SEVERITY (HCC): Status: ACTIVE | Noted: 2023-04-11

## 2023-04-17 NOTE — TELEPHONE ENCOUNTER
Requested Prescriptions     Pending Prescriptions Disp Refills    apixaban (ELIQUIS) 5 MG TABS tablet 180 tablet 3     Sig: Take 1 tablet by mouth 2 times daily    dronedarone hcl (MULTAQ) 400 MG TABS 180 tablet 3     Sig: Take 1 tablet by mouth 2 times daily (with meals) Take 1 tablet twice daily.

## 2023-05-12 ENCOUNTER — NURSE ONLY (OUTPATIENT)
Dept: INTERNAL MEDICINE CLINIC | Facility: CLINIC | Age: 73
End: 2023-05-12

## 2023-05-12 DIAGNOSIS — E87.6 HYPOKALEMIA: ICD-10-CM

## 2023-05-12 LAB
ANION GAP SERPL CALC-SCNC: 6 MMOL/L (ref 2–11)
BUN SERPL-MCNC: 18 MG/DL (ref 8–23)
CALCIUM SERPL-MCNC: 8.5 MG/DL (ref 8.3–10.4)
CHLORIDE SERPL-SCNC: 107 MMOL/L (ref 101–110)
CO2 SERPL-SCNC: 27 MMOL/L (ref 21–32)
CREAT SERPL-MCNC: 0.7 MG/DL (ref 0.6–1)
GLUCOSE SERPL-MCNC: 142 MG/DL (ref 65–100)
POTASSIUM SERPL-SCNC: 3.8 MMOL/L (ref 3.5–5.1)
SODIUM SERPL-SCNC: 140 MMOL/L (ref 133–143)

## 2023-06-14 ENCOUNTER — APPOINTMENT (OUTPATIENT)
Dept: ULTRASOUND IMAGING | Age: 73
DRG: 638 | End: 2023-06-14
Payer: MEDICARE

## 2023-06-14 ENCOUNTER — HOSPITAL ENCOUNTER (INPATIENT)
Age: 73
LOS: 3 days | Discharge: HOME | DRG: 638 | End: 2023-06-17
Attending: EMERGENCY MEDICINE | Admitting: FAMILY MEDICINE
Payer: MEDICARE

## 2023-06-14 ENCOUNTER — APPOINTMENT (OUTPATIENT)
Dept: GENERAL RADIOLOGY | Age: 73
DRG: 638 | End: 2023-06-14
Payer: MEDICARE

## 2023-06-14 DIAGNOSIS — M86.9 OSTEOMYELITIS OF GREAT TOE OF RIGHT FOOT (HCC): Primary | ICD-10-CM

## 2023-06-14 DIAGNOSIS — L03.115 CELLULITIS OF RIGHT LEG: ICD-10-CM

## 2023-06-14 PROBLEM — I25.10 CAD (CORONARY ARTERY DISEASE): Status: ACTIVE | Noted: 2019-03-18

## 2023-06-14 PROBLEM — D68.69 HYPERCOAGULABILITY DUE TO ATRIAL FIBRILLATION (HCC): Chronic | Status: ACTIVE | Noted: 2023-04-11

## 2023-06-14 PROBLEM — E11.621 DIABETIC ULCER OF TOE OF RIGHT FOOT ASSOCIATED WITH TYPE 2 DIABETES MELLITUS, WITH BONE INVOLVEMENT WITHOUT EVIDENCE OF NECROSIS (HCC): Chronic | Status: ACTIVE | Noted: 2023-06-14

## 2023-06-14 PROBLEM — M20.11 ACQUIRED HALLUX VALGUS, RIGHT: Chronic | Status: ACTIVE | Noted: 2023-06-14

## 2023-06-14 PROBLEM — E66.01 SEVERE OBESITY (HCC): Status: RESOLVED | Noted: 2019-02-05 | Resolved: 2023-06-14

## 2023-06-14 PROBLEM — L97.516 DIABETIC ULCER OF TOE OF RIGHT FOOT ASSOCIATED WITH TYPE 2 DIABETES MELLITUS, WITH BONE INVOLVEMENT WITHOUT EVIDENCE OF NECROSIS (HCC): Chronic | Status: ACTIVE | Noted: 2023-06-14

## 2023-06-14 PROBLEM — I25.118 CORONARY ARTERY DISEASE WITH STABLE ANGINA PECTORIS (HCC): Status: ACTIVE | Noted: 2020-01-30

## 2023-06-14 PROBLEM — I48.91 HYPERCOAGULABILITY DUE TO ATRIAL FIBRILLATION (HCC): Chronic | Status: ACTIVE | Noted: 2023-04-11

## 2023-06-14 LAB
ALBUMIN SERPL-MCNC: 3.5 G/DL (ref 3.2–4.6)
ALBUMIN/GLOB SERPL: 0.9 (ref 0.4–1.6)
ALP SERPL-CCNC: 65 U/L (ref 50–136)
ALT SERPL-CCNC: 24 U/L (ref 12–65)
ANION GAP SERPL CALC-SCNC: 4 MMOL/L (ref 2–11)
AST SERPL-CCNC: 13 U/L (ref 15–37)
BASOPHILS # BLD: 0 K/UL (ref 0–0.2)
BASOPHILS NFR BLD: 0 % (ref 0–2)
BILIRUB SERPL-MCNC: 1.1 MG/DL (ref 0.2–1.1)
BUN SERPL-MCNC: 20 MG/DL (ref 8–23)
CALCIUM SERPL-MCNC: 8.7 MG/DL (ref 8.3–10.4)
CHLORIDE SERPL-SCNC: 105 MMOL/L (ref 101–110)
CO2 SERPL-SCNC: 29 MMOL/L (ref 21–32)
CREAT SERPL-MCNC: 0.9 MG/DL (ref 0.6–1)
CRP SERPL-MCNC: 5.2 MG/DL (ref 0–0.9)
DIFFERENTIAL METHOD BLD: ABNORMAL
EOSINOPHIL # BLD: 0 K/UL (ref 0–0.8)
EOSINOPHIL NFR BLD: 0 % (ref 0.5–7.8)
ERYTHROCYTE [DISTWIDTH] IN BLOOD BY AUTOMATED COUNT: 14.4 % (ref 11.9–14.6)
ERYTHROCYTE [SEDIMENTATION RATE] IN BLOOD: 39 MM/HR (ref 0–30)
GLOBULIN SER CALC-MCNC: 4.1 G/DL (ref 2.8–4.5)
GLUCOSE BLD STRIP.AUTO-MCNC: 121 MG/DL (ref 65–100)
GLUCOSE SERPL-MCNC: 180 MG/DL (ref 65–100)
HCT VFR BLD AUTO: 45.1 % (ref 35.8–46.3)
HGB BLD-MCNC: 15.2 G/DL (ref 11.7–15.4)
IMM GRANULOCYTES # BLD AUTO: 0 K/UL (ref 0–0.5)
IMM GRANULOCYTES NFR BLD AUTO: 0 % (ref 0–5)
LACTATE SERPL-SCNC: 0.8 MMOL/L (ref 0.4–2)
LYMPHOCYTES # BLD: 1.3 K/UL (ref 0.5–4.6)
LYMPHOCYTES NFR BLD: 14 % (ref 13–44)
MCH RBC QN AUTO: 31.6 PG (ref 26.1–32.9)
MCHC RBC AUTO-ENTMCNC: 33.7 G/DL (ref 31.4–35)
MCV RBC AUTO: 93.8 FL (ref 82–102)
MONOCYTES # BLD: 0.8 K/UL (ref 0.1–1.3)
MONOCYTES NFR BLD: 8 % (ref 4–12)
NEUTS SEG # BLD: 7.6 K/UL (ref 1.7–8.2)
NEUTS SEG NFR BLD: 78 % (ref 43–78)
NRBC # BLD: 0 K/UL (ref 0–0.2)
NT PRO BNP: 429 PG/ML (ref 5–125)
PLATELET # BLD AUTO: 150 K/UL (ref 150–450)
PMV BLD AUTO: 9.4 FL (ref 9.4–12.3)
POTASSIUM SERPL-SCNC: 4 MMOL/L (ref 3.5–5.1)
PROCALCITONIN SERPL-MCNC: <0.05 NG/ML (ref 0–0.49)
PROT SERPL-MCNC: 7.6 G/DL (ref 6.3–8.2)
RBC # BLD AUTO: 4.81 M/UL (ref 4.05–5.2)
SERVICE CMNT-IMP: ABNORMAL
SODIUM SERPL-SCNC: 138 MMOL/L (ref 133–143)
WBC # BLD AUTO: 9.8 K/UL (ref 4.3–11.1)

## 2023-06-14 PROCEDURE — 93971 EXTREMITY STUDY: CPT

## 2023-06-14 PROCEDURE — 80053 COMPREHEN METABOLIC PANEL: CPT

## 2023-06-14 PROCEDURE — 94660 CPAP INITIATION&MGMT: CPT

## 2023-06-14 PROCEDURE — 85652 RBC SED RATE AUTOMATED: CPT

## 2023-06-14 PROCEDURE — 36415 COLL VENOUS BLD VENIPUNCTURE: CPT

## 2023-06-14 PROCEDURE — 2580000003 HC RX 258: Performed by: EMERGENCY MEDICINE

## 2023-06-14 PROCEDURE — 96365 THER/PROPH/DIAG IV INF INIT: CPT

## 2023-06-14 PROCEDURE — 83880 ASSAY OF NATRIURETIC PEPTIDE: CPT

## 2023-06-14 PROCEDURE — 99285 EMERGENCY DEPT VISIT HI MDM: CPT

## 2023-06-14 PROCEDURE — 85025 COMPLETE CBC W/AUTO DIFF WBC: CPT

## 2023-06-14 PROCEDURE — 84145 PROCALCITONIN (PCT): CPT

## 2023-06-14 PROCEDURE — 6360000002 HC RX W HCPCS: Performed by: EMERGENCY MEDICINE

## 2023-06-14 PROCEDURE — 87040 BLOOD CULTURE FOR BACTERIA: CPT

## 2023-06-14 PROCEDURE — 5A09357 ASSISTANCE WITH RESPIRATORY VENTILATION, LESS THAN 24 CONSECUTIVE HOURS, CONTINUOUS POSITIVE AIRWAY PRESSURE: ICD-10-PCS | Performed by: FAMILY MEDICINE

## 2023-06-14 PROCEDURE — 96368 THER/DIAG CONCURRENT INF: CPT

## 2023-06-14 PROCEDURE — 94761 N-INVAS EAR/PLS OXIMETRY MLT: CPT

## 2023-06-14 PROCEDURE — 73660 X-RAY EXAM OF TOE(S): CPT

## 2023-06-14 PROCEDURE — 96366 THER/PROPH/DIAG IV INF ADDON: CPT

## 2023-06-14 PROCEDURE — 86140 C-REACTIVE PROTEIN: CPT

## 2023-06-14 PROCEDURE — 82962 GLUCOSE BLOOD TEST: CPT

## 2023-06-14 PROCEDURE — 1100000000 HC RM PRIVATE

## 2023-06-14 PROCEDURE — 83605 ASSAY OF LACTIC ACID: CPT

## 2023-06-14 RX ORDER — PROCHLORPERAZINE EDISYLATE 5 MG/ML
10 INJECTION INTRAMUSCULAR; INTRAVENOUS EVERY 6 HOURS PRN
Status: DISCONTINUED | OUTPATIENT
Start: 2023-06-14 | End: 2023-06-17 | Stop reason: HOSPADM

## 2023-06-14 RX ORDER — SODIUM CHLORIDE 9 MG/ML
INJECTION, SOLUTION INTRAVENOUS PRN
Status: DISCONTINUED | OUTPATIENT
Start: 2023-06-14 | End: 2023-06-17 | Stop reason: HOSPADM

## 2023-06-14 RX ORDER — LISINOPRIL 20 MG/1
20 TABLET ORAL DAILY
Status: DISCONTINUED | OUTPATIENT
Start: 2023-06-15 | End: 2023-06-17 | Stop reason: HOSPADM

## 2023-06-14 RX ORDER — SODIUM CHLORIDE 0.9 % (FLUSH) 0.9 %
5-40 SYRINGE (ML) INJECTION EVERY 12 HOURS SCHEDULED
Status: DISCONTINUED | OUTPATIENT
Start: 2023-06-14 | End: 2023-06-17 | Stop reason: HOSPADM

## 2023-06-14 RX ORDER — POLYETHYLENE GLYCOL 3350 17 G/17G
17 POWDER, FOR SOLUTION ORAL DAILY PRN
Status: DISCONTINUED | OUTPATIENT
Start: 2023-06-14 | End: 2023-06-17 | Stop reason: HOSPADM

## 2023-06-14 RX ORDER — ONDANSETRON 4 MG/1
4 TABLET, ORALLY DISINTEGRATING ORAL EVERY 8 HOURS PRN
Status: CANCELLED | OUTPATIENT
Start: 2023-06-14

## 2023-06-14 RX ORDER — ONDANSETRON 2 MG/ML
4 INJECTION INTRAMUSCULAR; INTRAVENOUS EVERY 6 HOURS PRN
Status: CANCELLED | OUTPATIENT
Start: 2023-06-14

## 2023-06-14 RX ORDER — ACETAMINOPHEN 650 MG/1
650 SUPPOSITORY RECTAL EVERY 6 HOURS PRN
Status: DISCONTINUED | OUTPATIENT
Start: 2023-06-14 | End: 2023-06-17 | Stop reason: HOSPADM

## 2023-06-14 RX ORDER — PROMETHAZINE HYDROCHLORIDE 12.5 MG/1
12.5 TABLET ORAL EVERY 6 HOURS PRN
Status: DISCONTINUED | OUTPATIENT
Start: 2023-06-14 | End: 2023-06-17 | Stop reason: HOSPADM

## 2023-06-14 RX ORDER — SODIUM CHLORIDE 0.9 % (FLUSH) 0.9 %
5-40 SYRINGE (ML) INJECTION PRN
Status: DISCONTINUED | OUTPATIENT
Start: 2023-06-14 | End: 2023-06-17 | Stop reason: HOSPADM

## 2023-06-14 RX ORDER — DEXTROSE MONOHYDRATE 100 MG/ML
INJECTION, SOLUTION INTRAVENOUS CONTINUOUS PRN
Status: DISCONTINUED | OUTPATIENT
Start: 2023-06-14 | End: 2023-06-17 | Stop reason: HOSPADM

## 2023-06-14 RX ORDER — INSULIN LISPRO 100 [IU]/ML
0-8 INJECTION, SOLUTION INTRAVENOUS; SUBCUTANEOUS
Status: DISCONTINUED | OUTPATIENT
Start: 2023-06-15 | End: 2023-06-17 | Stop reason: HOSPADM

## 2023-06-14 RX ORDER — ISOSORBIDE MONONITRATE 30 MG/1
30 TABLET, EXTENDED RELEASE ORAL DAILY
Status: DISCONTINUED | OUTPATIENT
Start: 2023-06-15 | End: 2023-06-17 | Stop reason: HOSPADM

## 2023-06-14 RX ORDER — ATORVASTATIN CALCIUM 10 MG/1
10 TABLET, FILM COATED ORAL DAILY
Status: DISCONTINUED | OUTPATIENT
Start: 2023-06-14 | End: 2023-06-17 | Stop reason: HOSPADM

## 2023-06-14 RX ORDER — ACETAMINOPHEN 325 MG/1
650 TABLET ORAL EVERY 6 HOURS PRN
Status: DISCONTINUED | OUTPATIENT
Start: 2023-06-14 | End: 2023-06-17 | Stop reason: HOSPADM

## 2023-06-14 RX ORDER — INSULIN LISPRO 100 [IU]/ML
0-4 INJECTION, SOLUTION INTRAVENOUS; SUBCUTANEOUS NIGHTLY
Status: DISCONTINUED | OUTPATIENT
Start: 2023-06-14 | End: 2023-06-17 | Stop reason: HOSPADM

## 2023-06-14 RX ORDER — CITALOPRAM 20 MG/1
20 TABLET ORAL
Status: DISCONTINUED | OUTPATIENT
Start: 2023-06-14 | End: 2023-06-17 | Stop reason: HOSPADM

## 2023-06-14 RX ADMIN — CEFEPIME 2000 MG: 2 INJECTION, POWDER, FOR SOLUTION INTRAVENOUS at 15:26

## 2023-06-14 RX ADMIN — VANCOMYCIN HYDROCHLORIDE 2000 MG: 10 INJECTION, POWDER, LYOPHILIZED, FOR SOLUTION INTRAVENOUS at 15:50

## 2023-06-14 ASSESSMENT — ENCOUNTER SYMPTOMS
VOMITING: 0
RHINORRHEA: 0
NAUSEA: 0
SHORTNESS OF BREATH: 0
COUGH: 0
DIARRHEA: 0

## 2023-06-14 ASSESSMENT — LIFESTYLE VARIABLES
HOW MANY STANDARD DRINKS CONTAINING ALCOHOL DO YOU HAVE ON A TYPICAL DAY: PATIENT DOES NOT DRINK
HOW OFTEN DO YOU HAVE A DRINK CONTAINING ALCOHOL: NEVER

## 2023-06-14 ASSESSMENT — PAIN SCALES - GENERAL: PAINLEVEL_OUTOF10: 3

## 2023-06-14 ASSESSMENT — PAIN - FUNCTIONAL ASSESSMENT: PAIN_FUNCTIONAL_ASSESSMENT: 0-10

## 2023-06-14 NOTE — ED NOTES
TRANSFER - OUT REPORT:    Verbal report given to RN on Rudy Ribeiro  being transferred to [facility] [id number] for routine progression of patient care       Report consisted of patient's Situation, Background, Assessment and   Recommendations(SBAR). Information from the following report(s) Nurse Handoff Report was reviewed with the receiving nurse. Johnson Fall Assessment:    Presents to emergency department  because of falls (Syncope, seizure, or loss of consciousness): No  Age > 70: Yes  Altered Mental Status, Intoxication with alcohol or substance confusion (Disorientation, impaired judgment, poor safety awaremess, or inability to follow instructions): No  Impaired Mobility: Ambulates or transfers with assistive devices or assistance; Unable to ambulate or transer.: No             Lines:   Peripheral IV Left Forearm (Active)        Opportunity for questions and clarification was provided.       Patient transported with:  Registered Nurse           Charlie Bradley RN  06/14/23 [id number]

## 2023-06-14 NOTE — H&P
CAD - resume imdur, statin. Hold apixaban for now. Saw cardiology yesterday. Mood d/o - resume celexa 20 qhs     ALCIDES - resume cpap qhs     PT/OT evals and PPD needed/ordered? No  Diet: regular, diabetic, cardiac   VTE prophylaxis: SCD's   Code status: Full     Hospital Problems:  Principal Problem:    Osteomyelitis of great toe of right foot ([facility] [address]  Active Problems:    Coronary artery disease with stable angina pectoris (HCC)    CAD (coronary artery disease)    HTN (hypertension)    Major depressive disorder, recurrent, moderate (HCC)    Severe obesity (BMI 35.0-39. 9) with comorbidity ([facility] [address]    Hypercoagulability due to atrial fibrillation (HCC)    Obstructive sleep apnea    Diabetic ulcer of toe of right foot associated with type 2 diabetes mellitus, with bone involvement without evidence of necrosis ([facility] [address]    Cellulitis of right lower extremity    Acquired hallux valgus, right  Resolved Problems:    * No resolved hospital problems. *       Past History:     Past Medical History:   Diagnosis Date    Arrhythmia     episode A. Fib with tachycardia 1/2014 (on a cruise) Converted back to normal sinus rhythm with Amiodarone    Arthritis     in hands    Cellulitis     history of cellulitis right foot, leg (hosp.  8/19/11) and 2nd episode in 1/2014 (not hospitalized);turned into ulcer on toe     Depression     Diabetes ([facility] [address] dx 2010    type 2, oral med, avg fbs 130-170, notices s/s hypoglycemia at 60, unsure last HA1C    Diverticulosis of colon (without mention of hemorrhage)     Edema extremities     chronic (treated with daily Furosemide)    Foot ulcer (HCC)     GERD (gastroesophageal reflux disease)     occ. episode with tomato-type products, relieved with rolaids or tums    Hammer toe     Hypercholesterolemia     no medication    Hypertension     controlled with meds    Neuropathy     in bilateral feet; no medication    Obese 2/18/14    BMI 39.1    Obstructive sleep apnea 4/11/2023    PUD (peptic ulcer disease)

## 2023-06-14 NOTE — ED NOTES
Attempted to call report x 2. Was told that the room is not assigned, not clean, and that they will call me back.      Kirkland Libman, RN  06/14/23 [id number]

## 2023-06-14 NOTE — ED TRIAGE NOTES
Patient ambulatory to triage with CO RLE redness and swelling. Reports being seen at podiatrist and having an ulcer on right great toe. Had completed 2 courses of antibiotics without resolution in symptoms. Hx DM, BGLs in 160s at home. Reports T last night 99.9.

## 2023-06-14 NOTE — ED PROVIDER NOTES
History:   Diagnosis Date    Arrhythmia     episode A. Fib with tachycardia 2014 (on a cruise) Converted back to normal sinus rhythm with Amiodarone    Arthritis     in hands    Cellulitis     history of cellulitis right foot, leg (hosp. 11) and 2nd episode in 2014 (not hospitalized);turned into ulcer on toe     Depression     Diabetes ([facility] [address] dx     type 2, oral med, avg fbs 130-170, notices s/s hypoglycemia at 60, unsure last HA1C    Diverticulosis of colon (without mention of hemorrhage)     Edema extremities     chronic (treated with daily Furosemide)    Foot ulcer (HCC)     GERD (gastroesophageal reflux disease)     occ. episode with tomato-type products, relieved with rolaids or tums    Hammer toe     Hypercholesterolemia     no medication    Hypertension     controlled with meds    Neuropathy     in bilateral feet; no medication    Obese 14    BMI 39.1    Obstructive sleep apnea 2023    PUD (peptic ulcer disease) 1971    Severe sepsis(995.92) 2011    Thromboembolus ([facility] [address]     right leg        Past Surgical History:   Procedure Laterality Date    APPENDECTOMY  1968    CARPAL TUNNEL RELEASE Right      SECTION  1975    CHOLECYSTECTOMY, LAPAROSCOPIC  1998    ORTHOPEDIC SURGERY      left foot    OTHER SURGICAL HISTORY  2007    abscess drained    OVARY REMOVAL  early 's    Rt ovary removed.  Left ovary intact    MN UNLISTED PROCEDURE ABDOMEN PERITONEUM & OMENTUM  2011    drainage abdominal wall abscess    TUBAL LIGATION          Social History     Socioeconomic History    Marital status:    Tobacco Use    Smoking status: Never    Smokeless tobacco: Never   Substance and Sexual Activity    Alcohol use: No    Drug use: No     Types: Prescription, OTC   Social History Narrative    Has PCP Dr Maykel Jang in [facility]  Lives with  at home         Social Determinants of Health     Financial Resource Strain: Low Risk     Difficulty of Paying Living Expenses: Not hard at all   Food

## 2023-06-15 ENCOUNTER — APPOINTMENT (OUTPATIENT)
Dept: ULTRASOUND IMAGING | Age: 73
DRG: 638 | End: 2023-06-15
Payer: MEDICARE

## 2023-06-15 ENCOUNTER — APPOINTMENT (OUTPATIENT)
Dept: MRI IMAGING | Age: 73
DRG: 638 | End: 2023-06-15
Payer: MEDICARE

## 2023-06-15 LAB
ALBUMIN SERPL-MCNC: 3.1 G/DL (ref 3.2–4.6)
ALBUMIN/GLOB SERPL: 0.8 (ref 0.4–1.6)
ALP SERPL-CCNC: 55 U/L (ref 50–136)
ALT SERPL-CCNC: 20 U/L (ref 12–65)
ANION GAP SERPL CALC-SCNC: 5 MMOL/L (ref 2–11)
AST SERPL-CCNC: 12 U/L (ref 15–37)
BASOPHILS # BLD: 0 K/UL (ref 0–0.2)
BASOPHILS NFR BLD: 0 % (ref 0–2)
BILIRUB SERPL-MCNC: 1 MG/DL (ref 0.2–1.1)
BUN SERPL-MCNC: 16 MG/DL (ref 8–23)
CALCIUM SERPL-MCNC: 8.5 MG/DL (ref 8.3–10.4)
CHLORIDE SERPL-SCNC: 108 MMOL/L (ref 101–110)
CO2 SERPL-SCNC: 26 MMOL/L (ref 21–32)
CREAT SERPL-MCNC: 0.5 MG/DL (ref 0.6–1)
DIFFERENTIAL METHOD BLD: ABNORMAL
EOSINOPHIL # BLD: 0.1 K/UL (ref 0–0.8)
EOSINOPHIL NFR BLD: 1 % (ref 0.5–7.8)
ERYTHROCYTE [DISTWIDTH] IN BLOOD BY AUTOMATED COUNT: 14.3 % (ref 11.9–14.6)
GLOBULIN SER CALC-MCNC: 3.7 G/DL (ref 2.8–4.5)
GLUCOSE BLD STRIP.AUTO-MCNC: 131 MG/DL (ref 65–100)
GLUCOSE SERPL-MCNC: 127 MG/DL (ref 65–100)
HCT VFR BLD AUTO: 44.7 % (ref 35.8–46.3)
HGB BLD-MCNC: 14.7 G/DL (ref 11.7–15.4)
IMM GRANULOCYTES # BLD AUTO: 0 K/UL (ref 0–0.5)
IMM GRANULOCYTES NFR BLD AUTO: 0 % (ref 0–5)
LYMPHOCYTES # BLD: 1.6 K/UL (ref 0.5–4.6)
LYMPHOCYTES NFR BLD: 23 % (ref 13–44)
MAGNESIUM SERPL-MCNC: 2.2 MG/DL (ref 1.8–2.4)
MCH RBC QN AUTO: 31.3 PG (ref 26.1–32.9)
MCHC RBC AUTO-ENTMCNC: 32.9 G/DL (ref 31.4–35)
MCV RBC AUTO: 95.1 FL (ref 82–102)
MONOCYTES # BLD: 0.7 K/UL (ref 0.1–1.3)
MONOCYTES NFR BLD: 10 % (ref 4–12)
MRSA DNA SPEC QL NAA+PROBE: NOT DETECTED
NEUTS SEG # BLD: 4.8 K/UL (ref 1.7–8.2)
NEUTS SEG NFR BLD: 66 % (ref 43–78)
NRBC # BLD: 0 K/UL (ref 0–0.2)
PLATELET # BLD AUTO: 119 K/UL (ref 150–450)
PMV BLD AUTO: 9.6 FL (ref 9.4–12.3)
POTASSIUM SERPL-SCNC: 3.5 MMOL/L (ref 3.5–5.1)
PROT SERPL-MCNC: 6.8 G/DL (ref 6.3–8.2)
RBC # BLD AUTO: 4.7 M/UL (ref 4.05–5.2)
S AUREUS CPE NOSE QL NAA+PROBE: NOT DETECTED
SERVICE CMNT-IMP: ABNORMAL
SODIUM SERPL-SCNC: 139 MMOL/L (ref 133–143)
VAS LEFT ABI: 1.06
VAS LEFT ARM BP: 138 MMHG
VAS LEFT DORSALIS PEDIS BP: 146 MMHG
VAS LEFT PTA BP: 144 MMHG
VAS LEFT TBI: 0.64
VAS LEFT TOE PRESSURE: 88 MMHG
VAS RIGHT ABI: 1.07
VAS RIGHT ARM BP: 131 MMHG
VAS RIGHT DORSALIS PEDIS BP: 145 MMHG
VAS RIGHT PTA BP: 148 MMHG
VAS RIGHT TBI: 0.75
VAS RIGHT TOE PRESSURE: 104 MMHG
WBC # BLD AUTO: 7.3 K/UL (ref 4.3–11.1)

## 2023-06-15 PROCEDURE — 6370000000 HC RX 637 (ALT 250 FOR IP): Performed by: FAMILY MEDICINE

## 2023-06-15 PROCEDURE — 87205 SMEAR GRAM STAIN: CPT

## 2023-06-15 PROCEDURE — 2580000003 HC RX 258: Performed by: FAMILY MEDICINE

## 2023-06-15 PROCEDURE — 87075 CULTR BACTERIA EXCEPT BLOOD: CPT

## 2023-06-15 PROCEDURE — 1100000000 HC RM PRIVATE

## 2023-06-15 PROCEDURE — 93925 LOWER EXTREMITY STUDY: CPT

## 2023-06-15 PROCEDURE — 87641 MR-STAPH DNA AMP PROBE: CPT

## 2023-06-15 PROCEDURE — 80053 COMPREHEN METABOLIC PANEL: CPT

## 2023-06-15 PROCEDURE — 82962 GLUCOSE BLOOD TEST: CPT

## 2023-06-15 PROCEDURE — 73720 MRI LWR EXTREMITY W/O&W/DYE: CPT

## 2023-06-15 PROCEDURE — 2500000003 HC RX 250 WO HCPCS: Performed by: FAMILY MEDICINE

## 2023-06-15 PROCEDURE — 6360000004 HC RX CONTRAST MEDICATION: Performed by: FAMILY MEDICINE

## 2023-06-15 PROCEDURE — 6360000002 HC RX W HCPCS: Performed by: FAMILY MEDICINE

## 2023-06-15 PROCEDURE — 94660 CPAP INITIATION&MGMT: CPT

## 2023-06-15 PROCEDURE — 36415 COLL VENOUS BLD VENIPUNCTURE: CPT

## 2023-06-15 PROCEDURE — 85025 COMPLETE CBC W/AUTO DIFF WBC: CPT

## 2023-06-15 PROCEDURE — 87070 CULTURE OTHR SPECIMN AEROBIC: CPT

## 2023-06-15 PROCEDURE — A9579 GAD-BASE MR CONTRAST NOS,1ML: HCPCS | Performed by: FAMILY MEDICINE

## 2023-06-15 PROCEDURE — 83735 ASSAY OF MAGNESIUM: CPT

## 2023-06-15 RX ORDER — L. ACIDOPHILUS/L.BULGARICUS 1MM CELL
4 TABLET ORAL 3 TIMES DAILY
Status: DISCONTINUED | OUTPATIENT
Start: 2023-06-15 | End: 2023-06-17 | Stop reason: HOSPADM

## 2023-06-15 RX ADMIN — DRONEDARONE 400 MG: 400 TABLET, FILM COATED ORAL at 00:05

## 2023-06-15 RX ADMIN — SODIUM CHLORIDE, PRESERVATIVE FREE 10 ML: 5 INJECTION INTRAVENOUS at 00:06

## 2023-06-15 RX ADMIN — Medication 4 TABLET: at 22:28

## 2023-06-15 RX ADMIN — GADOTERIDOL 17 ML: 279.3 INJECTION, SOLUTION INTRAVENOUS at 23:24

## 2023-06-15 RX ADMIN — ISOSORBIDE MONONITRATE 30 MG: 30 TABLET, EXTENDED RELEASE ORAL at 08:40

## 2023-06-15 RX ADMIN — VANCOMYCIN HYDROCHLORIDE 1500 MG: 10 INJECTION, POWDER, LYOPHILIZED, FOR SOLUTION INTRAVENOUS at 17:09

## 2023-06-15 RX ADMIN — TUBERCULIN PURIFIED PROTEIN DERIVATIVE 5 UNITS: 5 INJECTION, SOLUTION INTRADERMAL at 08:42

## 2023-06-15 RX ADMIN — CITALOPRAM HYDROBROMIDE 20 MG: 20 TABLET ORAL at 22:28

## 2023-06-15 RX ADMIN — Medication 4 TABLET: at 14:31

## 2023-06-15 RX ADMIN — SODIUM CHLORIDE, PRESERVATIVE FREE 10 ML: 5 INJECTION INTRAVENOUS at 08:43

## 2023-06-15 RX ADMIN — CEFEPIME 2000 MG: 2 INJECTION, POWDER, FOR SOLUTION INTRAVENOUS at 15:26

## 2023-06-15 RX ADMIN — ATORVASTATIN CALCIUM 10 MG: 10 TABLET, FILM COATED ORAL at 22:28

## 2023-06-15 RX ADMIN — DRONEDARONE 400 MG: 400 TABLET, FILM COATED ORAL at 17:16

## 2023-06-15 RX ADMIN — CEFEPIME 2000 MG: 2 INJECTION, POWDER, FOR SOLUTION INTRAVENOUS at 02:30

## 2023-06-15 RX ADMIN — LISINOPRIL 20 MG: 20 TABLET ORAL at 08:39

## 2023-06-15 RX ADMIN — SODIUM CHLORIDE, PRESERVATIVE FREE 10 ML: 5 INJECTION INTRAVENOUS at 22:29

## 2023-06-15 RX ADMIN — DRONEDARONE 400 MG: 400 TABLET, FILM COATED ORAL at 08:40

## 2023-06-15 RX ADMIN — CITALOPRAM HYDROBROMIDE 20 MG: 20 TABLET ORAL at 00:06

## 2023-06-15 ASSESSMENT — PAIN SCALES - GENERAL: PAINLEVEL_OUTOF10: 0

## 2023-06-15 NOTE — ACP (ADVANCE CARE PLANNING)
Advance Care Planning     General Advance Care Planning (ACP) Conversation    Date of Conversation: 6/14/2023  Conducted with: Patient with Decision Making Capacity    Healthcare Decision Maker:    Primary Decision Maker: Kamran Tay - [facility] - [phone]  Click here to complete Healthcare Decision Makers including selection of the Healthcare Decision Maker Relationship (ie \"Primary\"). Today we documented Decision Maker(s) consistent with Legal Next of Kin hierarchy. Content/Action Overview:  Has NO ACP documents/care preferences - refer to ACP Clinical Specialist  Reviewed DNR/DNI and patient elects Full Code (Attempt Resuscitation)  treatment goals, hospitalization preferences, and resuscitation preferences  No ACP documents on file. Full Code per physician order.     Length of Voluntary ACP Conversation in minutes:  <16 minutes (Non-Billable)    ELIU Rios

## 2023-06-15 NOTE — CARE COORDINATION
Case Management Assessment  Initial Evaluation    Date/Time of Evaluation: 6/15/2023 12:56 PM  Assessment Completed by: ELIU Braswell    If patient is discharged prior to next notation, then this note serves as note for discharge by case management. Patient Name: Castro Santiago                   YOB: 1950  Diagnosis: Cellulitis of right leg [L78.730]  Osteomyelitis of great toe of right foot [facility]) [M86.9]                   Date / Time: 6/14/2023  2:29 PM    Patient Admission Status: Inpatient   Readmission Risk (Low < 19, Mod (19-27), High > 27): Readmission Risk Score: 8.8    Current PCP: Cristiane Noriega MD  PCP verified by CM? Yes (Marinell Severance. Iona Gutierrez MD)    Chart Reviewed: Yes      History Provided by: Patient, Medical Record  Patient Orientation: Alert and Oriented, Person, Place, Self    Patient Cognition: Alert    Hospitalization in the last 30 days (Readmission):  No    If yes, Readmission Assessment in  Navigator will be completed. Advance Directives:      Code Status: Full Code   Patient's Primary Decision Maker is: Legal Next of Kin    Primary Decision Maker: Kamran Tay - Spouse - [phone]    Discharge Planning:    Patient lives with: Spouse/Significant Other Type of Home: House  Primary Care Giver: Self  Patient Support Systems include: Spouse/Significant Other, Children, Family Members   Current Financial resources: Medicare, Other (Comment) (BCBS as secondary)  Current community resources: None  Current services prior to admission: C-pap            Current DME:              Type of Home Care services:  None    ADLS  Prior functional level: Independent in ADLs/IADLs  Current functional level: Independent in ADLs/IADLs    PT AM-PAC:   /24  OT AM-PAC:   /24    Family can provide assistance at DC: Yes  Would you like Case Management to discuss the discharge plan with any other family members/significant others, and if so, who?  No  Plans to Return to Present

## 2023-06-15 NOTE — WOUND CARE
Patient seen for right great toe wound. Noted podiatrist removed this nail bed on last visit. ID consulted for OM, noted dry ulcerations in 2x3 cm area. Shallow and pink. Xeroform gauze dressing placed. Discussed wound care with patient. Answered all questions. Will follow as needed.

## 2023-06-16 LAB
ALBUMIN SERPL-MCNC: 3 G/DL (ref 3.2–4.6)
ALBUMIN/GLOB SERPL: 0.9 (ref 0.4–1.6)
ALP SERPL-CCNC: 75 U/L (ref 50–136)
ALT SERPL-CCNC: 23 U/L (ref 12–65)
ANION GAP SERPL CALC-SCNC: 6 MMOL/L (ref 2–11)
AST SERPL-CCNC: 10 U/L (ref 15–37)
BASOPHILS # BLD: 0 K/UL (ref 0–0.2)
BASOPHILS NFR BLD: 0 % (ref 0–2)
BILIRUB SERPL-MCNC: 0.5 MG/DL (ref 0.2–1.1)
BUN SERPL-MCNC: 19 MG/DL (ref 8–23)
CALCIUM SERPL-MCNC: 8.6 MG/DL (ref 8.3–10.4)
CHLORIDE SERPL-SCNC: 107 MMOL/L (ref 101–110)
CO2 SERPL-SCNC: 27 MMOL/L (ref 21–32)
CREAT SERPL-MCNC: 0.7 MG/DL (ref 0.6–1)
DIFFERENTIAL METHOD BLD: ABNORMAL
EOSINOPHIL # BLD: 0.1 K/UL (ref 0–0.8)
EOSINOPHIL NFR BLD: 1 % (ref 0.5–7.8)
ERYTHROCYTE [DISTWIDTH] IN BLOOD BY AUTOMATED COUNT: 14.3 % (ref 11.9–14.6)
GLOBULIN SER CALC-MCNC: 3.5 G/DL (ref 2.8–4.5)
GLUCOSE BLD STRIP.AUTO-MCNC: 135 MG/DL (ref 65–100)
GLUCOSE BLD STRIP.AUTO-MCNC: 137 MG/DL (ref 65–100)
GLUCOSE BLD STRIP.AUTO-MCNC: 144 MG/DL (ref 65–100)
GLUCOSE BLD STRIP.AUTO-MCNC: 154 MG/DL (ref 65–100)
GLUCOSE SERPL-MCNC: 133 MG/DL (ref 65–100)
HCT VFR BLD AUTO: 43.5 % (ref 35.8–46.3)
HGB BLD-MCNC: 14.3 G/DL (ref 11.7–15.4)
IMM GRANULOCYTES # BLD AUTO: 0 K/UL (ref 0–0.5)
IMM GRANULOCYTES NFR BLD AUTO: 0 % (ref 0–5)
LYMPHOCYTES # BLD: 2 K/UL (ref 0.5–4.6)
LYMPHOCYTES NFR BLD: 28 % (ref 13–44)
MAGNESIUM SERPL-MCNC: 2.3 MG/DL (ref 1.8–2.4)
MCH RBC QN AUTO: 31.1 PG (ref 26.1–32.9)
MCHC RBC AUTO-ENTMCNC: 32.9 G/DL (ref 31.4–35)
MCV RBC AUTO: 94.6 FL (ref 82–102)
MM INDURATION, POC: 0 MM (ref 0–5)
MONOCYTES # BLD: 0.7 K/UL (ref 0.1–1.3)
MONOCYTES NFR BLD: 10 % (ref 4–12)
NEUTS SEG # BLD: 4.5 K/UL (ref 1.7–8.2)
NEUTS SEG NFR BLD: 61 % (ref 43–78)
NRBC # BLD: 0 K/UL (ref 0–0.2)
PLATELET # BLD AUTO: 125 K/UL (ref 150–450)
PMV BLD AUTO: 10.3 FL (ref 9.4–12.3)
POTASSIUM SERPL-SCNC: 3.7 MMOL/L (ref 3.5–5.1)
PPD, POC: NEGATIVE
PROT SERPL-MCNC: 6.5 G/DL (ref 6.3–8.2)
RBC # BLD AUTO: 4.6 M/UL (ref 4.05–5.2)
SERVICE CMNT-IMP: ABNORMAL
SODIUM SERPL-SCNC: 140 MMOL/L (ref 133–143)
VANCOMYCIN SERPL-MCNC: 12.3 UG/ML
WBC # BLD AUTO: 7.3 K/UL (ref 4.3–11.1)

## 2023-06-16 PROCEDURE — 1100000000 HC RM PRIVATE

## 2023-06-16 PROCEDURE — 80053 COMPREHEN METABOLIC PANEL: CPT

## 2023-06-16 PROCEDURE — 6360000002 HC RX W HCPCS: Performed by: FAMILY MEDICINE

## 2023-06-16 PROCEDURE — 2580000003 HC RX 258: Performed by: FAMILY MEDICINE

## 2023-06-16 PROCEDURE — 80202 ASSAY OF VANCOMYCIN: CPT

## 2023-06-16 PROCEDURE — 99222 1ST HOSP IP/OBS MODERATE 55: CPT | Performed by: PHYSICIAN ASSISTANT

## 2023-06-16 PROCEDURE — 83735 ASSAY OF MAGNESIUM: CPT

## 2023-06-16 PROCEDURE — 85025 COMPLETE CBC W/AUTO DIFF WBC: CPT

## 2023-06-16 PROCEDURE — 6370000000 HC RX 637 (ALT 250 FOR IP): Performed by: FAMILY MEDICINE

## 2023-06-16 PROCEDURE — 82962 GLUCOSE BLOOD TEST: CPT

## 2023-06-16 RX ADMIN — ISOSORBIDE MONONITRATE 30 MG: 30 TABLET, EXTENDED RELEASE ORAL at 09:22

## 2023-06-16 RX ADMIN — VANCOMYCIN HYDROCHLORIDE 1500 MG: 10 INJECTION, POWDER, LYOPHILIZED, FOR SOLUTION INTRAVENOUS at 11:30

## 2023-06-16 RX ADMIN — SODIUM CHLORIDE, PRESERVATIVE FREE 10 ML: 5 INJECTION INTRAVENOUS at 09:23

## 2023-06-16 RX ADMIN — CITALOPRAM HYDROBROMIDE 20 MG: 20 TABLET ORAL at 21:34

## 2023-06-16 RX ADMIN — LISINOPRIL 20 MG: 20 TABLET ORAL at 09:22

## 2023-06-16 RX ADMIN — Medication 4 TABLET: at 15:46

## 2023-06-16 RX ADMIN — DRONEDARONE 400 MG: 400 TABLET, FILM COATED ORAL at 09:22

## 2023-06-16 RX ADMIN — SODIUM CHLORIDE, PRESERVATIVE FREE 10 ML: 5 INJECTION INTRAVENOUS at 21:34

## 2023-06-16 RX ADMIN — Medication 4 TABLET: at 09:21

## 2023-06-16 RX ADMIN — CEFEPIME 2000 MG: 2 INJECTION, POWDER, FOR SOLUTION INTRAVENOUS at 03:24

## 2023-06-16 RX ADMIN — DRONEDARONE 400 MG: 400 TABLET, FILM COATED ORAL at 17:52

## 2023-06-16 RX ADMIN — CEFEPIME 2000 MG: 2 INJECTION, POWDER, FOR SOLUTION INTRAVENOUS at 16:50

## 2023-06-16 RX ADMIN — Medication 4 TABLET: at 21:34

## 2023-06-16 NOTE — CONSULTS
Comprehensive Nutrition Assessment    Type and Reason for Visit: Initial, Consult, Wound  Wounds (Hospitalists)    Nutrition Recommendations/Plan:   Meals and Snacks:  Diet: Continue current order  Nutrition Supplement Therapy:  Medical food supplement therapy:  Initiate Brandon twice per day (this provides 90 kcal and 2.5 grams protein per packet)     Malnutrition Assessment:  Malnutrition Status: No malnutrition    No chan wasting  Nutrition Assessment:  Nutrition History: Patient reports a protein drink and banana for breakfast and then a normal lunch and dinner. She denies any changes to PO or weight prior to admission. Do You Have Any Cultural, Baptism, or Ethnic Food Preferences?: No   Nutrition Background:       Patient with PMH significant for CAD, DM, Afib, HTN, HLD. She presented with worsening right toe infection. She was admitted with osteomyelitis of right great toe. Nutrition Interval:  Patient seen sitting up in bed with spouse at bedside. She states that her appetite is good and she at all of both breakfast and lunch. Discussed wound healing supplement and she is agreeable. Current Nutrition Therapies:  ADULT DIET; Regular; 3 carb choices (45 gm/meal); Low Fat/Low Chol/High Fiber/ERIC    Current Intake:   Average Meal Intake: %        Anthropometric Measures:  Height: 5' 2\" (157.5 cm)  Current Body Wt: 188 lb 7.9 oz (85.5 kg) (6/14), Weight source: Standing Scale  BMI: 34.5, Obese Class 1 (BMI 30.0-34. 9)  Admission Body Weight: 184 lb (83.5 kg) (stated)  Ideal Body Weight (Kg) (Calculated): 50 kg (110 lbs), 171.4 %  Usual Body Wt: 185 lb (83.9 kg) (per EMR), Percent weight change: 1.9       BMI Category Obese Class 1 (BMI 30.0-34. 9)  Estimated Daily Nutrient Needs:  Energy (kcal/day): 9468-5183 (15-20 kcal/kg) (Kcal/kg Weight used: 85.5 kg Current  Protein (g/day): 64-86 (20% kcal) Weight Used: (Other (Comment))    Fluid (ml/day):   (1 ml/kcal)    Nutrition Diagnosis:   Increased
(TYLENOL) suppository 650 mg  650 mg Rectal Q6H PRN    insulin lispro (HUMALOG) injection vial 0-8 Units  0-8 Units SubCUTAneous TID WC    insulin lispro (HUMALOG) injection vial 0-4 Units  0-4 Units SubCUTAneous Nightly    ceFEPIme (MAXIPIME) 2,000 mg in sodium chloride 0.9 % 50 mL IVPB (mini-bag)  2,000 mg IntraVENous Q12H    prochlorperazine (COMPAZINE) injection 10 mg  10 mg IntraVENous Q6H PRN    promethazine (PHENERGAN) tablet 12.5 mg  12.5 mg Oral Q6H PRN    lisinopril (PRINIVIL;ZESTRIL) tablet 20 mg  20 mg Oral Daily    isosorbide mononitrate (IMDUR) extended release tablet 30 mg  30 mg Oral Daily    citalopram (CELEXA) tablet 20 mg  20 mg Oral QHS    atorvastatin (LIPITOR) tablet 10 mg  10 mg Oral Daily    dronedarone hcl (MULTAQ) tablet 400 mg  400 mg Oral BID WC         Review of Systems:  Pertinent items are noted in HPI. Physical Exam:      General: NAD, Alert, Oriented x 3   Mental Status: Appropriate   Psych: Normal Affect, Normal Mood    HEENT: Normal Cephalic/Atraumatic, PERRL   Lungs: Respirations even and unlabored, Breath Sounds were clear, no respiratory distress   Heart: Regular Rate and Rhythm   Vascular: Distal pulses intact, good capillary refill   Skin: Mild residual redness of the right lower leg. See wound of right great toe on chart  Musculoskeletal: Good plantarflexion dorsiflexion of the right foot.   See image of right great toe wound on chart  Lymphatic: No lympahdenopathy, No distal edema   Neuro: No gross deficits   Abdomen: Soft, Non tender, No distension      VITALS: Patient Vitals for the past 8 hrs:   BP Temp Temp src Pulse Resp SpO2   23 1051 (!) 152/75 98.6 °F (37 °C) Oral 51 -- 96 %   23 0714 (!) 163/63 97.7 °F (36.5 °C) Oral 57 19 92 %    , Temp (24hrs), Av.3 °F (36.8 °C), Min:97.7 °F (36.5 °C), Max:98.8 °F (37.1 °C)           Diagnosis   Patient Active Problem List   Diagnosis    Depression    Atrial fibrillation (HCC)    Coronary artery disease with
31.3 26.1 - 32.9 PG    MCHC 32.9 31.4 - 35.0 g/dL    RDW 14.3 11.9 - 14.6 %    Platelets 278 (L) 977 - 450 K/uL    MPV 9.6 9.4 - 12.3 FL    nRBC 0.00 0.0 - 0.2 K/uL    Differential Type AUTOMATED      Neutrophils % 66 43 - 78 %    Lymphocytes % 23 13 - 44 %    Monocytes % 10 4.0 - 12.0 %    Eosinophils % 1 0.5 - 7.8 %    Basophils % 0 0.0 - 2.0 %    Immature Granulocytes 0 0.0 - 5.0 %    Neutrophils Absolute 4.8 1.7 - 8.2 K/UL    Lymphocytes Absolute 1.6 0.5 - 4.6 K/UL    Monocytes Absolute 0.7 0.1 - 1.3 K/UL    Eosinophils Absolute 0.1 0.0 - 0.8 K/UL    Basophils Absolute 0.0 0.0 - 0.2 K/UL    Absolute Immature Granulocyte 0.0 0.0 - 0.5 K/UL   Magnesium    Collection Time: 06/15/23  5:40 AM   Result Value Ref Range    Magnesium 2.2 1.8 - 2.4 mg/dL   Comprehensive Metabolic Panel    Collection Time: 06/15/23  5:40 AM   Result Value Ref Range    Sodium 139 133 - 143 mmol/L    Potassium 3.5 3.5 - 5.1 mmol/L    Chloride 108 101 - 110 mmol/L    CO2 26 21 - 32 mmol/L    Anion Gap 5 2 - 11 mmol/L    Glucose 127 (H) 65 - 100 mg/dL    BUN 16 8 - 23 MG/DL    Creatinine 0.50 (L) 0.6 - 1.0 MG/DL    Est, Glom Filt Rate >60 >60 ml/min/1.73m2    Calcium 8.5 8.3 - 10.4 MG/DL    Total Bilirubin 1.0 0.2 - 1.1 MG/DL    ALT 20 12 - 65 U/L    AST 12 (L) 15 - 37 U/L    Alk Phosphatase 55 50 - 136 U/L    Total Protein 6.8 6.3 - 8.2 g/dL    Albumin 3.1 (L) 3.2 - 4.6 g/dL    Globulin 3.7 2.8 - 4.5 g/dL    Albumin/Globulin Ratio 0.8 0.4 - 1.6          Microbiology:  Reviewed    Studies:  Reviewed    Signed By: DORON Nunez - LINH     Yara 15, 2023

## 2023-06-17 VITALS
WEIGHT: 185.1 LBS | TEMPERATURE: 98.6 F | SYSTOLIC BLOOD PRESSURE: 150 MMHG | HEIGHT: 62 IN | DIASTOLIC BLOOD PRESSURE: 79 MMHG | HEART RATE: 57 BPM | BODY MASS INDEX: 34.06 KG/M2 | RESPIRATION RATE: 18 BRPM | OXYGEN SATURATION: 96 %

## 2023-06-17 LAB
ALBUMIN SERPL-MCNC: 2.9 G/DL (ref 3.2–4.6)
ALBUMIN/GLOB SERPL: 0.8 (ref 0.4–1.6)
ALP SERPL-CCNC: 53 U/L (ref 50–136)
ALT SERPL-CCNC: 21 U/L (ref 12–65)
ANION GAP SERPL CALC-SCNC: 3 MMOL/L (ref 2–11)
AST SERPL-CCNC: 12 U/L (ref 15–37)
BASOPHILS # BLD: 0 K/UL (ref 0–0.2)
BASOPHILS NFR BLD: 1 % (ref 0–2)
BILIRUB SERPL-MCNC: 0.7 MG/DL (ref 0.2–1.1)
BUN SERPL-MCNC: 21 MG/DL (ref 8–23)
CALCIUM SERPL-MCNC: 8.5 MG/DL (ref 8.3–10.4)
CHLORIDE SERPL-SCNC: 111 MMOL/L (ref 101–110)
CO2 SERPL-SCNC: 27 MMOL/L (ref 21–32)
CREAT SERPL-MCNC: 0.6 MG/DL (ref 0.6–1)
DIFFERENTIAL METHOD BLD: ABNORMAL
EOSINOPHIL # BLD: 0.1 K/UL (ref 0–0.8)
EOSINOPHIL NFR BLD: 2 % (ref 0.5–7.8)
ERYTHROCYTE [DISTWIDTH] IN BLOOD BY AUTOMATED COUNT: 14.4 % (ref 11.9–14.6)
GLOBULIN SER CALC-MCNC: 3.7 G/DL (ref 2.8–4.5)
GLUCOSE BLD STRIP.AUTO-MCNC: 146 MG/DL (ref 65–100)
GLUCOSE BLD STRIP.AUTO-MCNC: 167 MG/DL (ref 65–100)
GLUCOSE SERPL-MCNC: 152 MG/DL (ref 65–100)
HCT VFR BLD AUTO: 42.8 % (ref 35.8–46.3)
HGB BLD-MCNC: 14.1 G/DL (ref 11.7–15.4)
IMM GRANULOCYTES # BLD AUTO: 0 K/UL (ref 0–0.5)
IMM GRANULOCYTES NFR BLD AUTO: 0 % (ref 0–5)
LYMPHOCYTES # BLD: 2.1 K/UL (ref 0.5–4.6)
LYMPHOCYTES NFR BLD: 32 % (ref 13–44)
MAGNESIUM SERPL-MCNC: 2.3 MG/DL (ref 1.8–2.4)
MCH RBC QN AUTO: 31.4 PG (ref 26.1–32.9)
MCHC RBC AUTO-ENTMCNC: 32.9 G/DL (ref 31.4–35)
MCV RBC AUTO: 95.3 FL (ref 82–102)
MM INDURATION, POC: 0 MM (ref 0–5)
MONOCYTES # BLD: 0.6 K/UL (ref 0.1–1.3)
MONOCYTES NFR BLD: 9 % (ref 4–12)
NEUTS SEG # BLD: 3.6 K/UL (ref 1.7–8.2)
NEUTS SEG NFR BLD: 56 % (ref 43–78)
NRBC # BLD: 0 K/UL (ref 0–0.2)
PLATELET # BLD AUTO: 133 K/UL (ref 150–450)
PMV BLD AUTO: 9.7 FL (ref 9.4–12.3)
POTASSIUM SERPL-SCNC: 4 MMOL/L (ref 3.5–5.1)
PPD, POC: NEGATIVE
PROT SERPL-MCNC: 6.6 G/DL (ref 6.3–8.2)
RBC # BLD AUTO: 4.49 M/UL (ref 4.05–5.2)
SERVICE CMNT-IMP: ABNORMAL
SERVICE CMNT-IMP: ABNORMAL
SODIUM SERPL-SCNC: 141 MMOL/L (ref 133–143)
WBC # BLD AUTO: 6.4 K/UL (ref 4.3–11.1)

## 2023-06-17 PROCEDURE — 6360000002 HC RX W HCPCS: Performed by: FAMILY MEDICINE

## 2023-06-17 PROCEDURE — 36415 COLL VENOUS BLD VENIPUNCTURE: CPT

## 2023-06-17 PROCEDURE — 6370000000 HC RX 637 (ALT 250 FOR IP): Performed by: FAMILY MEDICINE

## 2023-06-17 PROCEDURE — 80053 COMPREHEN METABOLIC PANEL: CPT

## 2023-06-17 PROCEDURE — 83735 ASSAY OF MAGNESIUM: CPT

## 2023-06-17 PROCEDURE — 2580000003 HC RX 258: Performed by: FAMILY MEDICINE

## 2023-06-17 PROCEDURE — 82962 GLUCOSE BLOOD TEST: CPT

## 2023-06-17 PROCEDURE — 85025 COMPLETE CBC W/AUTO DIFF WBC: CPT

## 2023-06-17 RX ORDER — CEFADROXIL 500 MG/1
500 CAPSULE ORAL 2 TIMES DAILY
Qty: 60 CAPSULE | Refills: 0 | Status: SHIPPED | OUTPATIENT
Start: 2023-06-17 | End: 2023-07-17

## 2023-06-17 RX ORDER — DOXYCYCLINE HYCLATE 100 MG
100 TABLET ORAL 2 TIMES DAILY
Qty: 60 TABLET | Refills: 0 | Status: SHIPPED | OUTPATIENT
Start: 2023-06-17 | End: 2023-07-17

## 2023-06-17 RX ORDER — SACCHAROMYCES BOULARDII 250 MG
250 CAPSULE ORAL 2 TIMES DAILY
Qty: 60 CAPSULE | Refills: 0 | Status: SHIPPED | OUTPATIENT
Start: 2023-06-17 | End: 2023-07-17

## 2023-06-17 RX ADMIN — VANCOMYCIN HYDROCHLORIDE 1500 MG: 10 INJECTION, POWDER, LYOPHILIZED, FOR SOLUTION INTRAVENOUS at 05:18

## 2023-06-17 RX ADMIN — LISINOPRIL 20 MG: 20 TABLET ORAL at 08:06

## 2023-06-17 RX ADMIN — DRONEDARONE 400 MG: 400 TABLET, FILM COATED ORAL at 08:06

## 2023-06-17 RX ADMIN — SODIUM CHLORIDE, PRESERVATIVE FREE 10 ML: 5 INJECTION INTRAVENOUS at 08:09

## 2023-06-17 RX ADMIN — CEFEPIME 2000 MG: 2 INJECTION, POWDER, FOR SOLUTION INTRAVENOUS at 03:06

## 2023-06-17 RX ADMIN — Medication 4 TABLET: at 08:05

## 2023-06-17 RX ADMIN — ISOSORBIDE MONONITRATE 30 MG: 30 TABLET, EXTENDED RELEASE ORAL at 08:06

## 2023-06-17 NOTE — DISCHARGE SUMMARY
Platelets 133 (L) 081 - 450 K/uL    MPV 9.7 9.4 - 12.3 FL    nRBC 0.00 0.0 - 0.2 K/uL    Differential Type AUTOMATED      Neutrophils % 56 43 - 78 %    Lymphocytes % 32 13 - 44 %    Monocytes % 9 4.0 - 12.0 %    Eosinophils % 2 0.5 - 7.8 %    Basophils % 1 0.0 - 2.0 %    Immature Granulocytes 0 0.0 - 5.0 %    Neutrophils Absolute 3.6 1.7 - 8.2 K/UL    Lymphocytes Absolute 2.1 0.5 - 4.6 K/UL    Monocytes Absolute 0.6 0.1 - 1.3 K/UL    Eosinophils Absolute 0.1 0.0 - 0.8 K/UL    Basophils Absolute 0.0 0.0 - 0.2 K/UL    Absolute Immature Granulocyte 0.0 0.0 - 0.5 K/UL   Magnesium    Collection Time: 06/17/23  7:05 AM   Result Value Ref Range    Magnesium 2.3 1.8 - 2.4 mg/dL   Comprehensive Metabolic Panel    Collection Time: 06/17/23  7:05 AM   Result Value Ref Range    Sodium 141 133 - 143 mmol/L    Potassium 4.0 3.5 - 5.1 mmol/L    Chloride 111 (H) 101 - 110 mmol/L    CO2 27 21 - 32 mmol/L    Anion Gap 3 2 - 11 mmol/L    Glucose 152 (H) 65 - 100 mg/dL    BUN 21 8 - 23 MG/DL    Creatinine 0.60 0.6 - 1.0 MG/DL    Est, Glom Filt Rate >60 >60 ml/min/1.73m2    Calcium 8.5 8.3 - 10.4 MG/DL    Total Bilirubin 0.7 0.2 - 1.1 MG/DL    ALT 21 12 - 65 U/L    AST 12 (L) 15 - 37 U/L    Alk Phosphatase 53 50 - 136 U/L    Total Protein 6.6 6.3 - 8.2 g/dL    Albumin 2.9 (L) 3.2 - 4.6 g/dL    Globulin 3.7 2.8 - 4.5 g/dL    Albumin/Globulin Ratio 0.8 0.4 - 1.6     POCT Glucose    Collection Time: 06/17/23  7:47 AM   Result Value Ref Range    POC Glucose 167 (H) 65 - 100 mg/dL    Performed by: Holm (Moore)    PLEASE READ & DOCUMENT PPD TEST IN 48 HRS    Collection Time: 06/17/23  8:07 AM   Result Value Ref Range    PPD, (POC) Negative Negative    mm Induration 0 0 - 5 mm       Allergies   Allergen Reactions    Pravastatin Other (See Comments)     Body aches    Amoxicillin Other (See Comments) and Rash     Made her feel \"weird\"    Doxycycline Nausea And Vomiting    Flecainide Palpitations     Immunization History

## 2023-06-17 NOTE — PROGRESS NOTES
's visit with Mrs. Tay to convey care and concern and to explore spiritual needs. 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 Yaritza Acevedo, preferred name Iza Soares, is a retired hospital employee. Iza Soares was in good spirits, voicing no complaints. Naturally she is very concerned about her toe and I offered emotional/spiritual support including prayer as requested. Iza Soares has significant support from her family. Family was expected to visit today. Chaplains remain available for care.      Jean Whiting 67  Board Certified 
10- wound cultures obtained. MRSA obtained.  Dressing on toe changed
111 [facility],4Th Floor Pharmacokinetic Monitoring Service - Vancomycin    Consulting Provider: Guido Rivera   Indication: Toe cellulitis, r/o osteomyelitis  Target Concentration: Goal AUC/MUSA 400-600 mg*hr/L  Day of Therapy: 3  Additional Antimicrobials: Cefepime    Patient eligible for piperacillin-tazobactam to cefepime auto-substitution per P&T approved protocol? NO    Pertinent Laboratory Values: Wt Readings from Last 1 Encounters:   06/14/23 188 lb 9.6 oz (85.5 kg)     Temp Readings from Last 1 Encounters:   06/16/23 97.7 °F (36.5 °C) (Oral)     Recent Labs     06/14/23  1438 06/15/23  0540 06/16/23  0327   BUN 20 16 19   CREATININE 0.90 0.50* 0.70   WBC 9.8 7.3 7.3   PROCAL <0.05  --   --      Estimated Creatinine Clearance: 73 mL/min (based on SCr of 0.7 mg/dL). Lab Results   Component Value Date/Time    VANCORANDOM 12.3 06/16/2023 03:27 AM       MRSA Nasal Swab: N/A.  Non-respiratory infection    Assessment:  Date/Time Dose Concentration AUC   6/16 0327 1500 mg q24h 12.3 385   Note: Serum concentrations collected for AUC dosing may appear elevated if collected in close proximity to the dose administered, this is not necessarily an indication of toxicity    Plan:  Current dosing regimen is sub-therapeutic  Increase dose to 1500 mg q18h for predicted AUC/Tr of 504/14.6  Repeat vancomycin concentrations will be ordered as clinically appropriate   Pharmacy will continue to monitor patient and adjust therapy as indicated    Thank you for the consult,  Jose Antonio Mensah, PharmD, BCOP  Clinical Pharmacist  Contact [employer]
111 [facility],4Th Floor Pharmacokinetic Monitoring Service - Vancomycin    Consulting Provider: Karely Singer   Indication: Toe cellulitis, r/o osteomyelitis  Target Concentration: Goal AUC/MUSA 400-600 mg*hr/L  Day of Therapy: 4  Additional Antimicrobials: Cefepime    Patient eligible for piperacillin-tazobactam to cefepime auto-substitution per P&T approved protocol? NO    Pertinent Laboratory Values: Wt Readings from Last 1 Encounters:   06/16/23 185 lb 1.6 oz (84 kg)     Temp Readings from Last 1 Encounters:   06/17/23 97.7 °F (36.5 °C) (Oral)     Recent Labs     06/14/23  1438 06/15/23  0540 06/16/23  0327 06/17/23  0705   BUN 20 16 19 21   CREATININE 0.90 0.50* 0.70 0.60   WBC 9.8 7.3 7.3 6.4   PROCAL <0.05  --   --   --      Estimated Creatinine Clearance: 84 mL/min (based on SCr of 0.6 mg/dL). Lab Results   Component Value Date/Time    VANCORANDOM 12.3 06/16/2023 03:27 AM       MRSA Nasal Swab: N/A.  Non-respiratory infection    Assessment:  Date/Time Dose Concentration AUC   6/16 0327 1500 mg q24h 12.3 385   Note: Serum concentrations collected for AUC dosing may appear elevated if collected in close proximity to the dose administered, this is not necessarily an indication of toxicity    Plan:  Current dosing regimen is sub-therapeutic  Repeat vancomycin concentrations will be ordered as clinically appropriate   Pharmacy will continue to monitor patient and adjust therapy as indicated    Thank you for the consult,  DONTE Marti [facility]
111 [facility],4Th Floor Pharmacokinetic Monitoring Service - Vancomycin    Consulting Provider: Analy Mclain   Indication: Toe cellulitis, r/o osteomyelitis  Target Concentration: Goal AUC/MUSA 400-600 mg*hr/L  Day of Therapy: 1  Additional Antimicrobials: Cefepime    Patient eligible for piperacillin-tazobactam to cefepime auto-substitution per P&T approved protocol? NO    Pertinent Laboratory Values: Wt Readings from Last 1 Encounters:   06/14/23 188 lb 9.6 oz (85.5 kg)     Temp Readings from Last 1 Encounters:   06/14/23 98.5 °F (36.9 °C) (Oral)     Recent Labs     06/14/23  1438   BUN 20   CREATININE 0.90   WBC 9.8   PROCAL <0.05     Estimated Creatinine Clearance: 57 mL/min (based on SCr of 0.9 mg/dL). No results found for: Fariha Robertson    MRSA Nasal Swab: N/A.  Non-respiratory infection    Assessment:  Date/Time Dose Concentration AUC         Note: Serum concentrations collected for AUC dosing may appear elevated if collected in close proximity to the dose administered, this is not necessarily an indication of toxicity    Plan:  Dosing recommendations based on Bayesian software  Start vancomycin 2000 mg x1, then 1500 mg q 24 hours  Anticipated AUC of 475 and trough concentration of 13.5 at steady state  Renal labs as indicated   Vancomycin concentrations will be ordered as clinically appropriate   Pharmacy will continue to monitor patient and adjust therapy as indicated    Thank you for the consult,  Linus Beebe, VickieD, BCPS
Called and gave update to pt's spouse Ayala Showers. All questions answered.     Ivone Martínez, DO
EOS:    -pt given abx per MAR  -pt moved to room 515   -no complaints of pain  -pt resting in bed  -no needs at this time  -vss
Hospitalist Progress Note   Admit Date:  2023  2:29 PM   Name:  Magaly Molina   Age:  67 y.o. Sex:  female  :  1950   MRN:  [id number]   Room:      Presenting/Chief Complaint: Skin Problem     Reason(s) for Admission: Cellulitis of right leg [L03.115]  Osteomyelitis of great toe of right foot [facility]) [M86.9]     Hospital Course:   Magaly Molina is a 67 y.o. female with medical history of T2DM, AFIB on apixaban, CAD who presented with worsening right great toe wound and RLE rash associated with low grade fever and chills. Foot wound has been present for about 6 weeks. Has been f/b podiatry. Treated with 2 rounds of clindamycin. Rash of distal RLE flared up about 2 weeks PTA and improved with clindamycin. Then  it acutely worsened. She saw podiatry 2 days PTA and had debridement. She has a history of right 2nd toe OM and was on 6 weeks of IV abx at that time. Patient does not want toe amputation if possible. She denies known history of PAD. In ED, VSS. Venous duplex RLE negative for DVT. CRP 5.2, ESR 39. R foot XR Soft tissue irregularity/defect along the right great toe distal phalanx with osseous erosions along the distal tuft and plantar aspect of the distal phalanx, suspicious for osteomyelitis. Patient was admitted with osteomyelitis of great toe of right foot and cellulitis of RLE. Pt was started empirically on Vanc/Cefepime. Blood cultures NGTD. Subjective & 24hr Events:     Patient was alert and oriented x3 this morning. Stated that she feels that her toe is getting somewhat better she thinks.   She denies fever, chills, SOB, chest pain      Assessment & Plan:     Osteomyelitis of great toe of right foot  Cellulitis of right lower extremity  X-ray right toe showing soft tissue irregularity/defect along right great toe distal phalanx with osseous erosions along the distal tuft and plantar aspect of distal phalanx suspicious for osteomyelitis  Venous duplex negative for
Infectious Disease Progress Note      Today's Date: 6/16/2023   Admit Date: 6/14/2023    Impression:   R foot GT distal phalanx osteomyelitis/RLE cellulitis. MRI with concern for distal toe OM, superficial cx pending  DM, well controlled  Elevated ESR/CRP    Plan:   Continue Vancomycin and Cefepime  Follow ortho plans  She can transition to PO antbx at any point: Doxycycline 100mg PO BID and Duricef 500mg PO BID   If she undergoes amputation, would give 7 days total  If no surgical intervention undertaken, would given 4 weeks  No ID follow up needed  ID will sign off, please call with questions or concerns      Anti-infectives:   Vancomycin/cefepime 6/14-    Subjective:   Resting in bed with family at bedside. Reviewed ID treatment      HPI    Patient is a 67 y.o. female with PMH of obesity, T2DM, AFIB on apixaban, CAD, HTN, HLD, ALCIDES who presented with worsening right great toe wound and RLE cellulitis associated with low grade fever and chills. Foot wound has been present for about 6 weeks. Has been f/b podiatry. Treated with 2 rounds of clindamycin. Rash of distal RLE flared up about 2 weeks ago and improved with clnidamycin. 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 She saw podiatry 3 days ago and had debridement. In ED, VSS, no leukocytosis. Venous duplex RLE negative for DVT. XR of right foot concerning for distal phalanx OM. ESR 39, CRP 5.2. MRI pending. She has been started on Vanc and Cefepime. Allergies   Allergen Reactions    Pravastatin Other (See Comments)     Body aches    Amoxicillin Other (See Comments) and Rash     Made her feel \"weird\"    Doxycycline Nausea And Vomiting    Flecainide Palpitations        Review of Systems:  All systems reviewed and negative except as otherwise stated in the HPI    Objective:   Blood pressure (!) 152/75, pulse 51, temperature 98.6 °F (37 °C), temperature source Oral, resp.  rate 18, height 5' 2\" (1.575 m), weight 188 lb 9.6 oz (85.5 kg), SpO2
MRI completed. Either RN or tech will take her back to room.
Ms. Jewell eGe does not have any weight bearing precautions at this time and is up independently so there is no indication for skilled PT at this time.   [facility], PT
Ms. Sonya Norwood does not have any weight bearing restrictions at this time and is up independently completing all ADLs and functional mobility without issue. No occupational therapy needs indicated. Will remove from caseload.     Angela Sweeney, OTR/L
Noted Ortho to see this morning. Please provide weight bearing status.   [facility], PT
Occupational Therapy Note:    Attempted to see patient this AM for occupational therapy evaluation session. Patient with ortho consult pending, will wait for weightbearing status. Will follow and re-attempt as schedule permits/patient available.  Thank you,    Therese Clement, OT    Rehab Caseload Tracker
Patient discharge instructions completed and prescriptions sent with patient. Patient was instructed on follow-up appointments. Patient to be followed by orthopedic doctor for great right toe as outpatient. IV's were removed and patient returning home with . Patient was rolled out by staff in wheel chair. No further questions at this time.
Left TBI 0.64    POCT Glucose    Collection Time: 06/15/23  4:02 PM   Result Value Ref Range    POC Glucose 131 (H) 65 - 100 mg/dL    Performed by: Hunter Valenzuela.     POCT Glucose    Collection Time: 06/15/23  8:58 PM   Result Value Ref Range    POC Glucose 131 (H) 65 - 100 mg/dL    Performed by: Analia    Vancomycin Level, Random    Collection Time: 06/16/23  3:27 AM   Result Value Ref Range    Vancomycin Rm 12.3 UG/ML   CBC with Auto Differential    Collection Time: 06/16/23  3:27 AM   Result Value Ref Range    WBC 7.3 4.3 - 11.1 K/uL    RBC 4.60 4.05 - 5.2 M/uL    Hemoglobin 14.3 11.7 - 15.4 g/dL    Hematocrit 43.5 35.8 - 46.3 %    MCV 94.6 82 - 102 FL    MCH 31.1 26.1 - 32.9 PG    MCHC 32.9 31.4 - 35.0 g/dL    RDW 14.3 11.9 - 14.6 %    Platelets 429 (L) 807 - 450 K/uL    MPV 10.3 9.4 - 12.3 FL    nRBC 0.00 0.0 - 0.2 K/uL    Differential Type AUTOMATED      Neutrophils % 61 43 - 78 %    Lymphocytes % 28 13 - 44 %    Monocytes % 10 4.0 - 12.0 %    Eosinophils % 1 0.5 - 7.8 %    Basophils % 0 0.0 - 2.0 %    Immature Granulocytes 0 0.0 - 5.0 %    Neutrophils Absolute 4.5 1.7 - 8.2 K/UL    Lymphocytes Absolute 2.0 0.5 - 4.6 K/UL    Monocytes Absolute 0.7 0.1 - 1.3 K/UL    Eosinophils Absolute 0.1 0.0 - 0.8 K/UL    Basophils Absolute 0.0 0.0 - 0.2 K/UL    Absolute Immature Granulocyte 0.0 0.0 - 0.5 K/UL   Magnesium    Collection Time: 06/16/23  3:27 AM   Result Value Ref Range    Magnesium 2.3 1.8 - 2.4 mg/dL   Comprehensive Metabolic Panel    Collection Time: 06/16/23  3:27 AM   Result Value Ref Range    Sodium 140 133 - 143 mmol/L    Potassium 3.7 3.5 - 5.1 mmol/L    Chloride 107 101 - 110 mmol/L    CO2 27 21 - 32 mmol/L    Anion Gap 6 2 - 11 mmol/L    Glucose 133 (H) 65 - 100 mg/dL    BUN 19 8 - 23 MG/DL    Creatinine 0.70 0.6 - 1.0 MG/DL    Est, Glom Filt Rate >60 >60 ml/min/1.73m2    Calcium 8.6 8.3 - 10.4 MG/DL    Total Bilirubin 0.5 0.2 - 1.1 MG/DL    ALT 23 12 - 65 U/L    AST 10 (L) 15 - 37

## 2023-06-17 NOTE — CARE COORDINATION
Patient will be d/c home today. Patient has no needs identified at being d/c home today. Family will transport home. Patient has met all treatment goals / milestones. CM will continue to monitor and remain available for any needs that may occur. 06/14/23 2222   Service Assessment   Patient Orientation Alert and Oriented;Person;Place; Self   Cognition Alert   History Provided By Patient;Medical Record   Primary Caregiver Self   Accompanied By/Relationship No family at bedside   Support Systems Spouse/Significant Other;Children;Family Members   Patient's Healthcare Decision Maker is: Legal Next of Matty 68   PCP Verified by CM Yes  (Gato Figueroa. MD Ivan)   Last Visit to PCP Within last 3 months   Prior Functional Level Independent in ADLs/IADLs   Current Functional Level Independent in ADLs/IADLs   Can patient return to prior living arrangement Yes   Ability to make needs known: Good   Family able to assist with home care needs: Yes   Would you like for me to discuss the discharge plan with any other family members/significant others, and if so, who? No   Financial Resources Medicare; Other (Comment)  (BCBS as secondary)   Community Resources None   Social/Functional History   Lives With Spouse   Type of [address] One [address]   [facility]   (CPAP)   [place]   Homemaking Assistance Independent   Ambulation Assistance Independent   Transfer Assistance Independent   Active  Yes   Mode of Transportation Car   Occupation Retired   Discharge Planning   Type of [address] Prior To Admission [address] Medications No   Type of [address] None   Patient expects to be discharged to: [address] One [address]   History of falls?

## 2023-06-18 LAB
BACTERIA SPEC CULT: NORMAL
GRAM STN SPEC: NORMAL
GRAM STN SPEC: NORMAL
SERVICE CMNT-IMP: NORMAL

## 2023-06-19 ENCOUNTER — CARE COORDINATION (OUTPATIENT)
Dept: CARE COORDINATION | Facility: CLINIC | Age: 73
End: 2023-06-19

## 2023-06-19 LAB
BACTERIA SPEC CULT: NORMAL
BACTERIA SPEC CULT: NORMAL
SERVICE CMNT-IMP: NORMAL
SERVICE CMNT-IMP: NORMAL

## 2023-06-19 NOTE — CARE COORDINATION
[facility] Care Transitions Initial Follow Up Call    Call within 2 business days of discharge: Yes    Patient Current Location:  Home: [place] [address]    LPN Care Coordinator contacted the patient by telephone to perform post hospital discharge assessment. Verified name and  with patient as identifiers. Provided introduction to self, and explanation of the LPN Care Coordinator role. Patient: Wil Malave Patient : 1950   MRN: [id number]  Reason for Admission: RLE redness/swelling  Discharge Date: 23 RARS: Readmission Risk Score: 11.2      Last Discharge  Street       Date Complaint Diagnosis Description Type Department Provider    23 Skin Problem Osteomyelitis of great toe of right foot ([facility] [address] . .. ED to Hosp-Admission (Discharged) (ADMITTED) [id number] Ivone Martínez DO; Heather Canales. .. Was this an external facility discharge? No Discharge Facility: [facility]    Challenges to be reviewed by the provider   Additional needs identified to be addressed with provider: No  none               Method of communication with provider: none. LPN Care Coordinator reviewed discharge instructions, medical action plan, and red flags with patient who verbalized understanding. The patient was given an opportunity to ask questions and does not have any further questions or concerns at this time. Were discharge instructions available to patient? Yes. Reviewed appropriate site of care based on symptoms and resources available to patient including: PCP  Specialist  Urgent care clinics  When to call [address] The patient agrees to contact the PCP office for questions related to their healthcare. Advance Care Planning:   Does patient have an Advance Directive: not on file; education provided. Medication reconciliation was performed with patient, who verbalizes understanding of administration of home medications.  Medications reviewed, 1111F entered: N/A    Was

## 2023-06-20 ENCOUNTER — TELEPHONE (OUTPATIENT)
Dept: INTERNAL MEDICINE CLINIC | Facility: CLINIC | Age: 73
End: 2023-06-20

## 2023-06-21 ENCOUNTER — OFFICE VISIT (OUTPATIENT)
Dept: ORTHOPEDIC SURGERY | Age: 73
End: 2023-06-21
Payer: MEDICARE

## 2023-06-21 DIAGNOSIS — M86.9 OSTEOMYELITIS OF GREAT TOE OF RIGHT FOOT (HCC): Primary | ICD-10-CM

## 2023-06-21 PROCEDURE — G8399 PT W/DXA RESULTS DOCUMENT: HCPCS | Performed by: ORTHOPAEDIC SURGERY

## 2023-06-21 PROCEDURE — 1111F DSCHRG MED/CURRENT MED MERGE: CPT | Performed by: ORTHOPAEDIC SURGERY

## 2023-06-21 PROCEDURE — G8417 CALC BMI ABV UP PARAM F/U: HCPCS | Performed by: ORTHOPAEDIC SURGERY

## 2023-06-21 PROCEDURE — G8427 DOCREV CUR MEDS BY ELIG CLIN: HCPCS | Performed by: ORTHOPAEDIC SURGERY

## 2023-06-21 PROCEDURE — 99214 OFFICE O/P EST MOD 30 MIN: CPT | Performed by: ORTHOPAEDIC SURGERY

## 2023-06-21 PROCEDURE — 3017F COLORECTAL CA SCREEN DOC REV: CPT | Performed by: ORTHOPAEDIC SURGERY

## 2023-06-21 PROCEDURE — 1123F ACP DISCUSS/DSCN MKR DOCD: CPT | Performed by: ORTHOPAEDIC SURGERY

## 2023-06-21 PROCEDURE — 1090F PRES/ABSN URINE INCON ASSESS: CPT | Performed by: ORTHOPAEDIC SURGERY

## 2023-06-21 PROCEDURE — 1036F TOBACCO NON-USER: CPT | Performed by: ORTHOPAEDIC SURGERY

## 2023-06-21 NOTE — PROGRESS NOTES
Name: Maura Comer  YOB: 1950  Gender: female  MRN: [id number]    Summary:   Right great toe osteomyelitis       CC: Foot Pain ([facility] foot [facility] f/u)       HPI: Maura Comer is a 67 y.o. female who presents with Foot Pain ([facility] foot [facility] f/u)  . This patient presents the office today with longstanding history of right great toe ulcer. She underwent apparently several debridements by podiatrist and subsequent admission to the hospital.  She had an MRI performed in the hospital showed osteomyelitis she is current on antibiotic treatment for this. She is now following week as an outpatient. History was obtained by Patient and her     ROS/Meds/PSH/PMH/FH/SH: I personally reviewed the patients standard intake form. Below are the pertinents    Tobacco:  reports that she has never smoked. She has never used smokeless tobacco.  Diabetes: Diabetic - non insulin      Physical Examination:  Exam of the right foot shows a hallux valgus deformity. I can palpate pulses strongly. She does have an ulceration located at the tip of the great toe there is no active erythema drainage or odor noted. Imaging:   I independently interpreted an MRI ordered by a different physician, taken from an outside facility of the right foot which shows a great toe osteomyelitis           Yuniel White III, MD           Assessment:   Great toe osteomyelitis    Treatment Plan:   4 This is a chronic illness/condition with exacerbation and progression  Treatment at this time: Elective major surgery with procedural risk factors  Studies ordered: NO XR needed @ Next Visit    Weight-bearing status: WBAT        Return to work/work restrictions: none  No medications given    Discussed her options today. There and in agreement with me I do not think that antibiotics alone are going to solve this problem. We have recommended amputation of the great toe and they agree would like to proceed.     Surgery-right

## 2023-06-22 DIAGNOSIS — M86.9 OSTEOMYELITIS OF GREAT TOE OF RIGHT FOOT (HCC): Primary | ICD-10-CM

## 2023-06-23 ENCOUNTER — TELEPHONE (OUTPATIENT)
Dept: INTERNAL MEDICINE CLINIC | Facility: CLINIC | Age: 73
End: 2023-06-23

## 2023-06-23 LAB
BACTERIA SPEC CULT: NORMAL
SERVICE CMNT-IMP: NORMAL

## 2023-06-23 NOTE — TELEPHONE ENCOUNTER
Dr. Yvette Noriega at [facility] is requesting an order from CMS for the pt to hold eliquis 3 days prior to surgery on 8/3/2023. Pt is having right great toe amputation through metatarsal phalangeal joint.       Fax order to [phone]

## 2023-06-26 ENCOUNTER — CARE COORDINATION (OUTPATIENT)
Dept: CARE COORDINATION | Facility: CLINIC | Age: 73
End: 2023-06-26

## 2023-06-29 ENCOUNTER — TELEPHONE (OUTPATIENT)
Dept: ORTHOPEDIC SURGERY | Age: 73
End: 2023-06-29

## 2023-07-03 ENCOUNTER — CARE COORDINATION (OUTPATIENT)
Dept: CARE COORDINATION | Facility: CLINIC | Age: 73
End: 2023-07-03

## 2023-07-03 NOTE — CARE COORDINATION
Care Transitions Outreach Attempt    Call within 2 business days of discharge: Yes   Attempted to reach patient for transitions of care follow up. Unable to reach patient. Patient: Vaishnavi Stevens Patient : 1950 MRN: [id number]    Last Discharge [address]       Date Complaint Diagnosis Description Type Department Provider    23 Skin Problem Osteomyelitis of great toe of right foot ([address]) . .. ED to Hosp-Admission (Discharged) (ADMITTED) [id number] Coleen Kuhn DO; Orly Puga. .. Was this an external facility discharge? No Discharge Facility: [facility]    Noted following upcoming appointments from discharge chart review:   [facility] follow up appointment(s):   Future Appointments   Date Time Provider [address]   2023 10:20 AM DORON Farr - CNP [facility] AMB   10/13/2023  2:00 PM Allison Dixon MD [facility] AMB     Non-[facility] follow up appointment(s): na    SHILPI outreach follow up call. Left message, identified self, reason for call, return call contact information, [place] [address] [place] [phone] [phone]

## 2023-07-10 ENCOUNTER — CARE COORDINATION (OUTPATIENT)
Dept: CARE COORDINATION | Facility: CLINIC | Age: 73
End: 2023-07-10

## 2023-07-10 NOTE — CARE COORDINATION
Opened chart for FLORESITA SEGURA outreach follow up call. No answer and no return call last 3 attempts. No further outreach indicated at this time.

## 2023-07-31 RX ORDER — VITAMIN B COMPLEX
1 CAPSULE ORAL DAILY
COMMUNITY

## 2023-07-31 NOTE — PERIOP NOTE
Patient verified name and . Order for consent NOT found in EHR at time of PAT visit. Unable to verify case posting against order; surgery verified by patient. Type 1B surgery, PAT phone assessment complete. Orders not received. Labs per surgeon: unknown; no orders received    Labs per anesthesia protocol: POC glucose DOS    Patient answered medical/surgical history questions at their best of ability. All prior to admission medications documented in EPIC. Patient instructed to take the following medications the day of surgery according to anesthesia guidelines with a small sip of water: Imdur Multaq On the day before surgery please take Acetaminophen 1000mg in the morning and then again before bed. You may substitute for Tylenol 650 mg. Hold all vitamins 7 days prior to surgery and NSAIDS 5 days prior to surgery. Prescription meds to hold:Patient to be advised on whether or not to hold anticoagulant by the surgeon. Hold vitamins and supplements. Hold Jardiance and benazepril morning of procedure     Patient instructed on the following:    > Arrive at [facility], time of arrival to be called the day before by 1700  > NPO after midnight, unless otherwise indicated, including gum, mints, and ice chips  > Responsible adult must drive patient to the hospital, stay during surgery, and patient will need supervision 24 hours after anesthesia  > Use antibacterial soap in shower the night before surgery and on the morning of surgery  > All piercings must be removed prior to arrival.    > Leave all valuables (money and jewelry) at home but bring insurance card and ID on DOS.   > You may be required to pay a deductible or co-pay on the day of your procedure. You can pre-pay by calling [phone] if your surgery is at the [facility] or [phone] if your surgery is at the [facility]. > Do not wear make-up, nail polish, lotions, cologne, perfumes, powders, or oil on skin. Artificial nails are not permitted.

## 2023-08-02 ENCOUNTER — ANESTHESIA EVENT (OUTPATIENT)
Dept: SURGERY | Age: 73
End: 2023-08-02
Payer: MEDICARE

## 2023-08-02 NOTE — PERIOP NOTE
Preop department called to notify patient of arrival time for scheduled procedure. Instructions given to   - Arrive at [address] [facility]. - Remain NPO after midnight, unless otherwise indicated, including gum, mints, and ice chips. - Have a responsible adult to drive patient to the hospital, stay during surgery, and patient will need supervision 24 hours after anesthesia. - Use antibacterial soap in shower the night before surgery and on the morning of surgery.        Was patient contacted: yes  Voicemail left:   Numbers contacted: [phone]   Arrival time: 5873

## 2023-08-03 ENCOUNTER — HOSPITAL ENCOUNTER (OUTPATIENT)
Age: 73
Setting detail: OUTPATIENT SURGERY
Discharge: HOME | End: 2023-08-03
Attending: ORTHOPAEDIC SURGERY | Admitting: ORTHOPAEDIC SURGERY
Payer: MEDICARE

## 2023-08-03 ENCOUNTER — ANESTHESIA (OUTPATIENT)
Dept: SURGERY | Age: 73
End: 2023-08-03
Payer: MEDICARE

## 2023-08-03 VITALS
DIASTOLIC BLOOD PRESSURE: 82 MMHG | TEMPERATURE: 98.1 F | SYSTOLIC BLOOD PRESSURE: 155 MMHG | BODY MASS INDEX: 33.86 KG/M2 | WEIGHT: 184 LBS | HEIGHT: 62 IN | OXYGEN SATURATION: 94 % | HEART RATE: 50 BPM | RESPIRATION RATE: 18 BRPM

## 2023-08-03 DIAGNOSIS — G89.18 ACUTE POST-OPERATIVE PAIN: Primary | ICD-10-CM

## 2023-08-03 LAB
GLUCOSE BLD STRIP.AUTO-MCNC: 157 MG/DL (ref 65–100)
GLUCOSE BLD STRIP.AUTO-MCNC: 187 MG/DL (ref 65–100)
POTASSIUM BLD-SCNC: 3.9 MMOL/L (ref 3.5–5.1)
SERVICE CMNT-IMP: ABNORMAL
SERVICE CMNT-IMP: ABNORMAL

## 2023-08-03 PROCEDURE — 28820 AMPUTATION OF TOE: CPT | Performed by: ORTHOPAEDIC SURGERY

## 2023-08-03 PROCEDURE — 3600000002 HC SURGERY LEVEL 2 BASE: Performed by: ORTHOPAEDIC SURGERY

## 2023-08-03 PROCEDURE — 7100000010 HC PHASE II RECOVERY - FIRST 15 MIN: Performed by: ORTHOPAEDIC SURGERY

## 2023-08-03 PROCEDURE — 3600000012 HC SURGERY LEVEL 2 ADDTL 15MIN: Performed by: ORTHOPAEDIC SURGERY

## 2023-08-03 PROCEDURE — 82962 GLUCOSE BLOOD TEST: CPT

## 2023-08-03 PROCEDURE — 84132 ASSAY OF SERUM POTASSIUM: CPT

## 2023-08-03 PROCEDURE — 6360000002 HC RX W HCPCS: Performed by: NURSE PRACTITIONER

## 2023-08-03 PROCEDURE — 2500000003 HC RX 250 WO HCPCS: Performed by: NURSE ANESTHETIST, CERTIFIED REGISTERED

## 2023-08-03 PROCEDURE — 7100000000 HC PACU RECOVERY - FIRST 15 MIN: Performed by: ORTHOPAEDIC SURGERY

## 2023-08-03 PROCEDURE — 6360000002 HC RX W HCPCS: Performed by: NURSE ANESTHETIST, CERTIFIED REGISTERED

## 2023-08-03 PROCEDURE — 3700000000 HC ANESTHESIA ATTENDED CARE: Performed by: ORTHOPAEDIC SURGERY

## 2023-08-03 PROCEDURE — 2580000003 HC RX 258: Performed by: ANESTHESIOLOGY

## 2023-08-03 PROCEDURE — 7100000001 HC PACU RECOVERY - ADDTL 15 MIN: Performed by: ORTHOPAEDIC SURGERY

## 2023-08-03 PROCEDURE — 6360000002 HC RX W HCPCS: Performed by: ORTHOPAEDIC SURGERY

## 2023-08-03 PROCEDURE — 7100000011 HC PHASE II RECOVERY - ADDTL 15 MIN: Performed by: ORTHOPAEDIC SURGERY

## 2023-08-03 PROCEDURE — 2709999900 HC NON-CHARGEABLE SUPPLY: Performed by: ORTHOPAEDIC SURGERY

## 2023-08-03 PROCEDURE — 3700000001 HC ADD 15 MINUTES (ANESTHESIA): Performed by: ORTHOPAEDIC SURGERY

## 2023-08-03 RX ORDER — SODIUM CHLORIDE 0.9 % (FLUSH) 0.9 %
5-40 SYRINGE (ML) INJECTION PRN
Status: DISCONTINUED | OUTPATIENT
Start: 2023-08-03 | End: 2023-08-03 | Stop reason: HOSPADM

## 2023-08-03 RX ORDER — ONDANSETRON 2 MG/ML
4 INJECTION INTRAMUSCULAR; INTRAVENOUS
Status: DISCONTINUED | OUTPATIENT
Start: 2023-08-03 | End: 2023-08-03 | Stop reason: HOSPADM

## 2023-08-03 RX ORDER — HYDROMORPHONE HYDROCHLORIDE 2 MG/ML
0.25 INJECTION, SOLUTION INTRAMUSCULAR; INTRAVENOUS; SUBCUTANEOUS EVERY 5 MIN PRN
Status: DISCONTINUED | OUTPATIENT
Start: 2023-08-03 | End: 2023-08-03 | Stop reason: HOSPADM

## 2023-08-03 RX ORDER — SODIUM CHLORIDE, SODIUM LACTATE, POTASSIUM CHLORIDE, CALCIUM CHLORIDE 600; 310; 30; 20 MG/100ML; MG/100ML; MG/100ML; MG/100ML
INJECTION, SOLUTION INTRAVENOUS CONTINUOUS
Status: DISCONTINUED | OUTPATIENT
Start: 2023-08-03 | End: 2023-08-03 | Stop reason: HOSPADM

## 2023-08-03 RX ORDER — LIDOCAINE HYDROCHLORIDE 10 MG/ML
1 INJECTION, SOLUTION INFILTRATION; PERINEURAL
Status: DISCONTINUED | OUTPATIENT
Start: 2023-08-03 | End: 2023-08-03 | Stop reason: HOSPADM

## 2023-08-03 RX ORDER — SODIUM CHLORIDE 0.9 % (FLUSH) 0.9 %
5-40 SYRINGE (ML) INJECTION EVERY 12 HOURS SCHEDULED
Status: DISCONTINUED | OUTPATIENT
Start: 2023-08-03 | End: 2023-08-03 | Stop reason: HOSPADM

## 2023-08-03 RX ORDER — LIDOCAINE HYDROCHLORIDE 20 MG/ML
INJECTION, SOLUTION EPIDURAL; INFILTRATION; INTRACAUDAL; PERINEURAL PRN
Status: DISCONTINUED | OUTPATIENT
Start: 2023-08-03 | End: 2023-08-03 | Stop reason: SDUPTHER

## 2023-08-03 RX ORDER — HYDRALAZINE HYDROCHLORIDE 20 MG/ML
10 INJECTION INTRAMUSCULAR; INTRAVENOUS
Status: DISCONTINUED | OUTPATIENT
Start: 2023-08-03 | End: 2023-08-03 | Stop reason: HOSPADM

## 2023-08-03 RX ORDER — SULFAMETHOXAZOLE AND TRIMETHOPRIM 800; 160 MG/1; MG/1
1 TABLET ORAL 2 TIMES DAILY
Qty: 6 TABLET | Refills: 0 | Status: SHIPPED | OUTPATIENT
Start: 2023-08-03 | End: 2023-08-06

## 2023-08-03 RX ORDER — FENTANYL CITRATE 50 UG/ML
100 INJECTION, SOLUTION INTRAMUSCULAR; INTRAVENOUS
Status: DISCONTINUED | OUTPATIENT
Start: 2023-08-03 | End: 2023-08-03 | Stop reason: HOSPADM

## 2023-08-03 RX ORDER — MIDAZOLAM HYDROCHLORIDE 2 MG/2ML
2 INJECTION, SOLUTION INTRAMUSCULAR; INTRAVENOUS
Status: DISCONTINUED | OUTPATIENT
Start: 2023-08-03 | End: 2023-08-03 | Stop reason: HOSPADM

## 2023-08-03 RX ORDER — HYDROMORPHONE HYDROCHLORIDE 2 MG/ML
0.5 INJECTION, SOLUTION INTRAMUSCULAR; INTRAVENOUS; SUBCUTANEOUS EVERY 5 MIN PRN
Status: DISCONTINUED | OUTPATIENT
Start: 2023-08-03 | End: 2023-08-03 | Stop reason: HOSPADM

## 2023-08-03 RX ORDER — SODIUM CHLORIDE 9 MG/ML
INJECTION, SOLUTION INTRAVENOUS PRN
Status: DISCONTINUED | OUTPATIENT
Start: 2023-08-03 | End: 2023-08-03 | Stop reason: HOSPADM

## 2023-08-03 RX ORDER — PROCHLORPERAZINE EDISYLATE 5 MG/ML
5 INJECTION INTRAMUSCULAR; INTRAVENOUS
Status: DISCONTINUED | OUTPATIENT
Start: 2023-08-03 | End: 2023-08-03 | Stop reason: HOSPADM

## 2023-08-03 RX ORDER — OXYCODONE HYDROCHLORIDE 5 MG/1
5 TABLET ORAL EVERY 6 HOURS PRN
Qty: 20 TABLET | Refills: 0 | Status: SHIPPED | OUTPATIENT
Start: 2023-08-03 | End: 2023-08-08

## 2023-08-03 RX ORDER — BUPIVACAINE HYDROCHLORIDE 5 MG/ML
INJECTION, SOLUTION EPIDURAL; INTRACAUDAL PRN
Status: DISCONTINUED | OUTPATIENT
Start: 2023-08-03 | End: 2023-08-03 | Stop reason: ALTCHOICE

## 2023-08-03 RX ORDER — OXYCODONE HYDROCHLORIDE 5 MG/1
5 TABLET ORAL
Status: DISCONTINUED | OUTPATIENT
Start: 2023-08-03 | End: 2023-08-03 | Stop reason: HOSPADM

## 2023-08-03 RX ORDER — ACETAMINOPHEN 500 MG
1000 TABLET ORAL ONCE
Status: DISCONTINUED | OUTPATIENT
Start: 2023-08-03 | End: 2023-08-03 | Stop reason: HOSPADM

## 2023-08-03 RX ORDER — PROPOFOL 10 MG/ML
INJECTION, EMULSION INTRAVENOUS CONTINUOUS PRN
Status: DISCONTINUED | OUTPATIENT
Start: 2023-08-03 | End: 2023-08-03 | Stop reason: SDUPTHER

## 2023-08-03 RX ORDER — LABETALOL HYDROCHLORIDE 5 MG/ML
10 INJECTION, SOLUTION INTRAVENOUS
Status: DISCONTINUED | OUTPATIENT
Start: 2023-08-03 | End: 2023-08-03 | Stop reason: HOSPADM

## 2023-08-03 RX ADMIN — LIDOCAINE HYDROCHLORIDE 100 MG: 20 INJECTION, SOLUTION EPIDURAL; INFILTRATION; INTRACAUDAL; PERINEURAL at 08:10

## 2023-08-03 RX ADMIN — SODIUM CHLORIDE, POTASSIUM CHLORIDE, SODIUM LACTATE AND CALCIUM CHLORIDE: 600; 310; 30; 20 INJECTION, SOLUTION INTRAVENOUS at 07:10

## 2023-08-03 RX ADMIN — PROPOFOL 20 MG: 10 INJECTION, EMULSION INTRAVENOUS at 08:26

## 2023-08-03 RX ADMIN — PROPOFOL 20 MG: 10 INJECTION, EMULSION INTRAVENOUS at 08:23

## 2023-08-03 RX ADMIN — PROPOFOL 100 MCG/KG/MIN: 10 INJECTION, EMULSION INTRAVENOUS at 08:10

## 2023-08-03 RX ADMIN — Medication 2000 MG: at 08:15

## 2023-08-03 ASSESSMENT — PAIN - FUNCTIONAL ASSESSMENT: PAIN_FUNCTIONAL_ASSESSMENT: 0-10

## 2023-08-03 NOTE — OP NOTE
Operative Note    Patient:Jacqueline French  MRN: [id number]    Date Of Surgery: 8/3/2023    Surgeon: Anika Huerta MD    Assistant Surgeon: None    Pre Op Diagnosis:  Pre-Op Diagnosis Codes:     * Osteomyelitis of toe of right foot ([address]) [M86.9]      Post Op Diagnosis:   same    Procedures Performed:  Right great toe amputation through metatarsal phalangeal joint, 94[address]    Implants:   * No implants in log *    Anesthesia:  Monitor Anesthesia Care    Blood Loss:  minimal    Tourniquet:  Estimated calf 5 minutes    Pre Operative Abx:   Ancef 2g            Pre Operative Course:  Rashi Vance is a 67 y.o. female who has a history of right chronic great toe osteomyelitis. Operation In Detail:  Patient was evaluated in the preoperative area. We had a long discussion about the procedure and postoperative protocols. The patient was then brought back to the operating room suite and placed in the operating room table. A timeout was taken to identify the patient, procedure being performed, and laterality. After this the patient was prepped and draped in the normal sterile fashion using a Betadine solution and/or a ChloraPrep solution. A timeout was then taken to identify the patient his name, date of birth, laterality, and procedure being performed. We also identified allergies and any concerns about the operation. Attention was then placed to the operative extremity. During a preop surgical timeout the right lower extremity was identified as a correct surgical site and prepped and draped in a standard sterile fashions and prep solution. A field block was obtained with 0.5% plain Marcaine. An incision was made at the level interphalangeal joint of the great toe in order to avoid any other contaminated skin or area of osteomyelitis. To allow for closure about half of the proximal phalanx was also resected at that time. The wound was then irrigated. Adequate hemostasis obtained with Bovie electrocautery.   The

## 2023-08-03 NOTE — DISCHARGE INSTRUCTIONS
POSTOPERATIVE SURGICAL INSTRUCTIONS/ INFORMATION:    Your narcotic (pain medication) and an antibiotic will be sent to the pharmacy listed in your chart. Both of these medications should be taken as directed on the bottle. If the surgery you had requires you to be nonweightbearing, in addition to the narcotic and antibiotic you will also have an aspirin prescription that should also be taken as directed on the bottle. This will be taken until you are told to discontinue it. If you run out of the prescription of aspirin and over-the-counter 81 mg aspirin will work as well. Nonweightbearing to the affected extremity means you are not allowed to put pressure or any weight on the surgical extremity. Information regarding scooters, crutches and other assistive devices will have been discussed at your presurgical office visit these devices are to be used until told otherwise by the doctor or nurse practitioner. Pain can be hard to manage postoperatively especially if you had an anesthetic nerve block and do not know when it will wear off. It is recommended that you start taking pain medication even with an anesthetic block the night of surgery. The anesthetic nerve block can last up to 72 hours postoperatively, your leg will likely be numb as long as the anesthetic block is working. If you are taking the pain medication as directed on the bottle and your pain is not managed or controlled you may double the medication, taking 2 tablets every 4-6 hours as needed for 24 hours. Once pain level is controlled you will resume the recommended dosage on the bottle. Pain medication may cause constipation, to help minimize this ensure you are drinking plenty of water after surgery. You may start a stool softener and/or MiraLAX to help alleviate or mitigate this problem. Please take these over-the-counter products as directed on the bottle.     Please make every effort to leave your postoperative dressing your risk for illness  A healthy diet, regular physical exercise & weight monitoring are important for maintaining a healthy lifestyle

## 2023-08-03 NOTE — ANESTHESIA POSTPROCEDURE EVALUATION
Department of Anesthesiology  Postprocedure Note    Patient: Rosalino Klein  MRN: [id number]  YOB: 1950  Date of evaluation: 8/3/2023      Procedure Summary     Date: 08/03/23 Room / Location: [facility] OP OR 01 / [facility]    Anesthesia Start: 0807 Anesthesia Stop: Bree Cartagena    Procedure: right great toe amputation through metatarsal phalangeal joint (Right: Toes) Diagnosis:       Osteomyelitis of toe of right foot ([address])      (Osteomyelitis of toe of right foot ([address]) [M86.9])    Surgeons: Rosa Jose MD Responsible Provider: Kee Goncalves DO    Anesthesia Type: MAC ASA Status: 3          Anesthesia Type: MAC    Vicente Phase I: Vicente Score: 10    Vicente Phase II: Vicente Score: 10      Anesthesia Post Evaluation    Patient location during evaluation: PACU  Level of consciousness: awake and alert  Airway patency: patent  Nausea & Vomiting: no nausea  Complications: no  Cardiovascular status: hemodynamically stable  Respiratory status: acceptable  Hydration status: euvolemic  Pain management: satisfactory to patient

## 2023-08-04 NOTE — PROGRESS NOTES
Spiritual Care visit. Initial Visit, Pre Surgery Consult. Visit and prayer with spouse after patient went to surgery.     Visit by Sigifredo Villareal M.Ed., Th.B. ,Staff

## 2023-08-09 DIAGNOSIS — I10 ESSENTIAL (PRIMARY) HYPERTENSION: ICD-10-CM

## 2023-08-09 RX ORDER — BENAZEPRIL HYDROCHLORIDE 20 MG/1
20 TABLET ORAL DAILY
Qty: 90 TABLET | Refills: 3 | Status: SHIPPED | OUTPATIENT
Start: 2023-08-09

## 2023-08-18 ENCOUNTER — OFFICE VISIT (OUTPATIENT)
Dept: ORTHOPEDIC SURGERY | Age: 73
End: 2023-08-18

## 2023-08-18 DIAGNOSIS — M86.9 OSTEOMYELITIS OF GREAT TOE OF RIGHT FOOT (HCC): Primary | ICD-10-CM

## 2023-08-18 PROCEDURE — 99024 POSTOP FOLLOW-UP VISIT: CPT | Performed by: NURSE PRACTITIONER

## 2023-08-18 NOTE — PROGRESS NOTES
Name: Artemio Bruno  YOB: 1950  Gender: female  MRN: [id number]    Procedure Performed: Right great toe amputation through metatarsal phalangeal joint        Date of Procedure: 08/03/2023      Subjective: Patient seems to be doing okay overall. She was little shocked at the side of her foot this morning, and did get a little lightheaded. She reports that she has some neuropathy in her foot and so she has not really felt much pain since the surgery. Physical Examination: Incisional area at the first PIP joint is healing well without signs of infection. She has palpable pulses with diminished sensation to the foot. She is able to wiggle the lesser toes without difficulty or pain. Range of motion to the great toe at the MTP joint was performed without difficulty or pain either. She has no pain with palpation to the plantar aspect of the first MTP joint. There is noted swelling to the great toe and dorsal aspect of her foot. Imaging:   No imaging reviewed          Assessment:   Post right great toe amputation through the MTP joint. We discussed progression of care today as well as expectations until her next follow-up visit. Questions and concerns were addressed, she verbalized understanding of today's conversation. Plan:   3 This is stable chronic illness/condition  Treatment at this time: Sutures were removed, Steri-Strips were placed. Patient may begin to wean from the assistive device that they can tolerate can tolerate and swelling allows. She Will continue to elevate the affected extremity as needed for swelling, ice can also be effective. She May now get the affected extremity wet including showering and soaking as needed, recommendation of Epsom salts was given to help with additional incisional healing as well as swelling purposes. Physical activities may be resumed as the patient can tolerate excluding anything high-impact at this time.   She will start home

## 2023-08-25 ENCOUNTER — TELEPHONE (OUTPATIENT)
Age: 73
End: 2023-08-25

## 2023-08-25 NOTE — TELEPHONE ENCOUNTER
Pt is asking for samples of multaq to get her through until [facility]

## 2023-09-05 ENCOUNTER — TELEPHONE (OUTPATIENT)
Dept: ORTHOPEDIC SURGERY | Age: 73
End: 2023-09-05

## 2023-09-05 NOTE — TELEPHONE ENCOUNTER
Spoke with patient about bleeding. She stated it was just a tiny amount of blood on her band-aid. I told her to be sure to not scrub her incision when showering and to elevate when needed. She voiced understanding.  Neris

## 2023-09-05 NOTE — TELEPHONE ENCOUNTER
She had surgery on August 3rd and has had some bleeding. She is wondering if she should be concerned. Please call.

## 2023-10-03 ENCOUNTER — TELEPHONE (OUTPATIENT)
Dept: INTERNAL MEDICINE CLINIC | Facility: CLINIC | Age: 73
End: 2023-10-03

## 2023-10-03 NOTE — TELEPHONE ENCOUNTER
URI/COVID    FEVER? yes  If yes, document temperature: 99.9    COUGH? yes  If yes, Dry or Productive:productive   If productive, document color: yellow     NASAL? yes  If yes, Stuffy or Productive: productive   If productive, document color: yellow    SHORTNESS OF BREATH OR DIFFICULTY BREATHING? no   Have you checked your O2 Sats? If so what are the readings? SORE THROAT? Just a little   If yes, document severity:    CHEST CONGESTION? yes    FATIGUE? yes     MUSCLE OR BODY ACHES? no     HEADACHES? no     NEW LOSS OF TASTE OR SMELL? no     NAUSEA OR VOMITING? no     DIARRHEA? no      MEDS? Cold and flu  If yes, list all Meds taken and duration:     HAVE YOU BEEN EXPOSED TO ANYONE WITH COVID/FLU? Daughter and son in law had It a 3 weeks or so ago she said     [address]? Will test once she gets home   IF TESTED PLEASE PUT RESULTS   IF NO PER MD YOU WILL NEED TO BE TESTED    WHEN  will you test for Covid? Today   CALL BACK WITH RESULTS AFTER TESTING    VACCINATION STATUS? yes    HOW LONG HAVE YOU EXPERIENCED THE ABOVE?  Started Thursday night     ALLERGIES: yes     PHARMACY[facility]    :[birth year]

## 2023-10-04 DIAGNOSIS — J06.9 UPPER RESPIRATORY TRACT INFECTION, UNSPECIFIED TYPE: Primary | ICD-10-CM

## 2023-10-04 RX ORDER — AZITHROMYCIN 250 MG/1
250 TABLET, FILM COATED ORAL SEE ADMIN INSTRUCTIONS
Qty: 6 TABLET | Refills: 0 | Status: SHIPPED | OUTPATIENT
Start: 2023-10-04 | End: 2023-10-09

## 2023-12-28 ENCOUNTER — TELEPHONE (OUTPATIENT)
Age: 73
End: 2023-12-28

## 2023-12-28 NOTE — TELEPHONE ENCOUNTER
Regarding: New Prescriptions for Rock Manges and [place] Levels  Contact: [phone]  ----- Message from Ashley Daniel, [address] sent at 2023  4:33 PM EST -----  Placed in box to send to online pharmacies [facility]     ----- Message from Ar Mccray to Lora Lovelace MD sent at 2023  3:05 PM -----   The new prescription in needed ASAP for the Eliquis to be filled. Thanks!      ----- Message -----       From:Jacqueline Rao       Sent:2023  2:59 PM EST         To:Dr. Lora Lovelace    Subject:New Prescriptions for Rock Manges and [facility] -          Generic Jardiance - 25 mg - 180 Tablets        Generic Eliquis - 5 mg  - 180 Tablets    [place] Levels -  1950         Generic Multaq - 400 mg - 180 Tablets         Generic Eliquis - 5 mg - 180 Tablets         Generic Jardiance - 25 mg - 180 Tablets    Please fax the new prescriptions to [facility]  at [phone]. Thank you!   Have a very Merry Alaina!!    [place] Levels

## 2023-12-28 NOTE — TELEPHONE ENCOUNTER
MEDICATION REFILL REQUEST      Name of Medication:  multaq   Dose:  400 mg  Frequency:  twice a day   Quantity:    Days' supply:  90      Pharm[facility]Location[facility]    MEDICATION REFILL REQUEST      Name of Medication:  eliquis   Dose:  5 mg  Frequency:  twice a day   Quantity:    Days' supply:  90      Ph[facility]Location[facility]    MEDICATION REFILL REQUEST      Name of Medication:  jardiance   Dose:  25 mg  Frequency:  daily   Quantity:    Days' supply:  90      Pharmacy Name/Location:  [facility]

## 2023-12-28 NOTE — TELEPHONE ENCOUNTER
Spoke to pt and informed her refills have been authorized through [employer], and to give us a call if trouble getting her refills. Pt voiced understanding.

## 2024-01-09 ENCOUNTER — TELEPHONE (OUTPATIENT)
Age: 74
End: 2024-01-09

## 2024-01-09 NOTE — TELEPHONE ENCOUNTER
Shipment of Jardiance not arrived from [place] for her and her  Kamran Tay Do we have samples they will  in any office Please call

## 2024-01-10 ENCOUNTER — TELEPHONE (OUTPATIENT)
Age: 74
End: 2024-01-10

## 2024-01-10 NOTE — TELEPHONE ENCOUNTER
Called pt and let pt know that samples are at [facility]. Pt voiced understanding.

## 2024-01-10 NOTE — TELEPHONE ENCOUNTER
Patient is calling back from yesterday, stated she never heard from anyone and is trying to get jardiance samples. Willing to go to any location.

## 2024-01-18 NOTE — TELEPHONE ENCOUNTER
Pt called in stated that she waiting on her juardiance to come threw the mail order and would like to know if a rx of juradinace can be sent to [facility] [place] until she gets other ones bc she is running low

## 2024-01-18 NOTE — TELEPHONE ENCOUNTER
Requested Prescriptions     Pending Prescriptions Disp Refills    empagliflozin (JARDIANCE) 25 MG tablet 90 tablet 3     Sig: Take 1 tablet by mouth daily     Called and spoke with patient who stated she is not sure how long it would take to get her medication would like to just have rx sent to local pharmacy instead of ordering from Online Pharmacies.

## 2024-02-03 DIAGNOSIS — E87.6 HYPOKALEMIA: ICD-10-CM

## 2024-02-05 RX ORDER — POTASSIUM CHLORIDE 1500 MG/1
20 TABLET, EXTENDED RELEASE ORAL 2 TIMES DAILY
Qty: 180 TABLET | Refills: 3 | Status: SHIPPED | OUTPATIENT
Start: 2024-02-05

## 2024-02-08 ENCOUNTER — TELEPHONE (OUTPATIENT)
Dept: ORTHOPEDIC SURGERY | Age: 74
End: 2024-02-08

## 2024-02-08 NOTE — TELEPHONE ENCOUNTER
Told her to give her foot a rest from the shoes she was wearing and to soak in Epsom if needed and to not over do it but if it gets worse to call PCP as we didn't do surgery on that foot in the past

## 2024-02-08 NOTE — TELEPHONE ENCOUNTER
Patient states she has a blister on her L great toe and it is swollen from walking.  Please advise.

## 2024-03-13 ENCOUNTER — TELEPHONE (OUTPATIENT)
Age: 74
End: 2024-03-13

## 2024-05-14 NOTE — TELEPHONE ENCOUNTER
Called pt and pt stated that she needed refills of Eliquis sent to a local pharmacy now that she has met her deductible.     Requested Prescriptions     Pending Prescriptions Disp Refills    apixaban (ELIQUIS) 5 MG TABS tablet 180 tablet 3     Sig: Take 1 tablet by mouth 2 times daily     Verified rx. Last OV 06/13/2023. Erx to pharm on file.

## 2024-07-26 ENCOUNTER — PATIENT MESSAGE (OUTPATIENT)
Age: 74
End: 2024-07-26

## 2024-07-26 DIAGNOSIS — I10 HTN (HYPERTENSION): Primary | ICD-10-CM

## 2024-07-26 RX ORDER — ISOSORBIDE MONONITRATE 30 MG/1
30 TABLET, EXTENDED RELEASE ORAL DAILY
Qty: 60 TABLET | Refills: 0 | Status: ON HOLD | OUTPATIENT
Start: 2024-07-26

## 2024-07-26 NOTE — TELEPHONE ENCOUNTER
Requested Prescriptions     Pending Prescriptions Disp Refills    isosorbide mononitrate (IMDUR) 30 MG extended release tablet 60 tablet 0     Sig: Take 1 tablet by mouth daily

## 2024-07-27 ENCOUNTER — HOSPITAL ENCOUNTER (INPATIENT)
Age: 74
LOS: 3 days | Discharge: HOME | DRG: 872 | End: 2024-07-31
Attending: EMERGENCY MEDICINE | Admitting: HOSPITALIST
Payer: MEDICARE

## 2024-07-27 ENCOUNTER — APPOINTMENT (OUTPATIENT)
Dept: GENERAL RADIOLOGY | Age: 74
DRG: 872 | End: 2024-07-27
Payer: MEDICARE

## 2024-07-27 DIAGNOSIS — I48.91 ATRIAL FIBRILLATION WITH RVR (HCC): ICD-10-CM

## 2024-07-27 DIAGNOSIS — A41.9 SEPTICEMIA (HCC): Primary | ICD-10-CM

## 2024-07-27 DIAGNOSIS — D69.6 THROMBOCYTOPENIA (HCC): ICD-10-CM

## 2024-07-27 LAB
BILIRUB UR QL: NEGATIVE
GLUCOSE UR QL STRIP.AUTO: 500 MG/DL
KETONES UR-MCNC: NEGATIVE MG/DL
LEUKOCYTE ESTERASE UR QL STRIP: NEGATIVE
NITRITE UR QL: NEGATIVE
PH UR: 5.5 (ref 5–9)
PROT UR QL: NEGATIVE MG/DL
RBC # UR STRIP: NEGATIVE
SERVICE CMNT-IMP: ABNORMAL
SP GR UR: 1.01 (ref 1–1.02)
UROBILINOGEN UR QL: 0.2 EU/DL (ref 0.2–1)

## 2024-07-27 PROCEDURE — 87635 SARS-COV-2 COVID-19 AMP PRB: CPT

## 2024-07-27 PROCEDURE — 87077 CULTURE AEROBIC IDENTIFY: CPT

## 2024-07-27 PROCEDURE — 87205 SMEAR GRAM STAIN: CPT

## 2024-07-27 PROCEDURE — 96375 TX/PRO/DX INJ NEW DRUG ADDON: CPT

## 2024-07-27 PROCEDURE — 96366 THER/PROPH/DIAG IV INF ADDON: CPT

## 2024-07-27 PROCEDURE — 87154 CUL TYP ID BLD PTHGN 6+ TRGT: CPT

## 2024-07-27 PROCEDURE — 83605 ASSAY OF LACTIC ACID: CPT

## 2024-07-27 PROCEDURE — 80053 COMPREHEN METABOLIC PANEL: CPT

## 2024-07-27 PROCEDURE — 84145 PROCALCITONIN (PCT): CPT

## 2024-07-27 PROCEDURE — 81003 URINALYSIS AUTO W/O SCOPE: CPT

## 2024-07-27 PROCEDURE — 96365 THER/PROPH/DIAG IV INF INIT: CPT

## 2024-07-27 PROCEDURE — 87040 BLOOD CULTURE FOR BACTERIA: CPT

## 2024-07-27 PROCEDURE — 99285 EMERGENCY DEPT VISIT HI MDM: CPT

## 2024-07-27 PROCEDURE — 87186 SC STD MICRODIL/AGAR DIL: CPT

## 2024-07-27 PROCEDURE — 85025 COMPLETE CBC W/AUTO DIFF WBC: CPT

## 2024-07-27 ASSESSMENT — PAIN - FUNCTIONAL ASSESSMENT: PAIN_FUNCTIONAL_ASSESSMENT: NONE - DENIES PAIN

## 2024-07-28 ENCOUNTER — APPOINTMENT (OUTPATIENT)
Dept: GENERAL RADIOLOGY | Age: 74
DRG: 872 | End: 2024-07-28
Payer: MEDICARE

## 2024-07-28 PROBLEM — A41.9 SEPSIS (HCC): Status: ACTIVE | Noted: 2024-07-28

## 2024-07-28 PROBLEM — R78.81 STREPTOCOCCAL BACTEREMIA: Status: ACTIVE | Noted: 2024-07-28

## 2024-07-28 PROBLEM — R65.10 SYSTEMIC INFLAMMATORY RESPONSE SYNDROME (HCC): Status: ACTIVE | Noted: 2024-07-28

## 2024-07-28 PROBLEM — B95.5 STREPTOCOCCAL BACTEREMIA: Status: ACTIVE | Noted: 2024-07-28

## 2024-07-28 LAB
ACCESSION NUMBER, LLC1M: ABNORMAL
ACINETOBACTER CALCOAC BAUMANNII COMPLEX BY PCR: NOT DETECTED
ALBUMIN SERPL-MCNC: 3.7 G/DL (ref 3.2–4.6)
ALBUMIN/GLOB SERPL: 1 (ref 1–1.9)
ALP SERPL-CCNC: 64 U/L (ref 35–104)
ALT SERPL-CCNC: 12 U/L (ref 12–65)
ANION GAP SERPL CALC-SCNC: 15 MMOL/L (ref 9–18)
APPEARANCE UR: CLEAR
AST SERPL-CCNC: 19 U/L (ref 15–37)
B FRAGILIS DNA BLD POS QL NAA+NON-PROBE: NOT DETECTED
BASOPHILS # BLD: 0 K/UL (ref 0–0.2)
BASOPHILS # BLD: 0.1 K/UL (ref 0–0.2)
BASOPHILS NFR BLD: 0 % (ref 0–2)
BASOPHILS NFR BLD: 0 % (ref 0–2)
BILIRUB SERPL-MCNC: 0.9 MG/DL (ref 0–1.2)
BILIRUB UR QL: NEGATIVE
BIOFIRE TEST COMMENT: ABNORMAL
BUN SERPL-MCNC: 28 MG/DL (ref 8–23)
C ALBICANS DNA BLD POS QL NAA+NON-PROBE: NOT DETECTED
C AURIS DNA BLD POS QL NAA+NON-PROBE: NOT DETECTED
C GATTII+NEOFOR DNA BLD POS QL NAA+N-PRB: NOT DETECTED
C GLABRATA DNA BLD POS QL NAA+NON-PROBE: NOT DETECTED
C KRUSEI DNA BLD POS QL NAA+NON-PROBE: NOT DETECTED
C PARAP DNA BLD POS QL NAA+NON-PROBE: NOT DETECTED
C TROPICLS DNA BLD POS QL NAA+NON-PROBE: NOT DETECTED
CALCIUM SERPL-MCNC: 8.8 MG/DL (ref 8.8–10.2)
CHLORIDE SERPL-SCNC: 104 MMOL/L (ref 98–107)
CO2 SERPL-SCNC: 21 MMOL/L (ref 20–28)
COLOR UR: NORMAL
CREAT SERPL-MCNC: 0.95 MG/DL (ref 0.6–1.1)
DIFFERENTIAL METHOD BLD: ABNORMAL
DIFFERENTIAL METHOD BLD: ABNORMAL
E CLOAC COMP DNA BLD POS NAA+NON-PROBE: NOT DETECTED
E COLI DNA BLD POS QL NAA+NON-PROBE: NOT DETECTED
E FAECALIS DNA BLD POS QL NAA+NON-PROBE: NOT DETECTED
E FAECIUM DNA BLD POS QL NAA+NON-PROBE: NOT DETECTED
ENTEROBACTERALES DNA BLD POS NAA+N-PRB: NOT DETECTED
EOSINOPHIL # BLD: 0 K/UL (ref 0–0.8)
EOSINOPHIL # BLD: 0 K/UL (ref 0–0.8)
EOSINOPHIL NFR BLD: 0 % (ref 0.5–7.8)
EOSINOPHIL NFR BLD: 0 % (ref 0.5–7.8)
ERYTHROCYTE [DISTWIDTH] IN BLOOD BY AUTOMATED COUNT: 14.7 % (ref 11.9–14.6)
ERYTHROCYTE [DISTWIDTH] IN BLOOD BY AUTOMATED COUNT: 14.8 % (ref 11.9–14.6)
ERYTHROCYTE [SEDIMENTATION RATE] IN BLOOD: 24 MM/HR (ref 0–30)
EST. AVERAGE GLUCOSE BLD GHB EST-MCNC: 141 MG/DL
GLOBULIN SER CALC-MCNC: 3.7 G/DL (ref 2.3–3.5)
GLUCOSE BLD STRIP.AUTO-MCNC: 131 MG/DL (ref 65–100)
GLUCOSE BLD STRIP.AUTO-MCNC: 171 MG/DL (ref 65–100)
GLUCOSE SERPL-MCNC: 184 MG/DL (ref 70–99)
GLUCOSE UR STRIP.AUTO-MCNC: >1000 MG/DL
GP B STREP DNA BLD POS QL NAA+NON-PROBE: DETECTED
HAEM INFLU DNA BLD POS QL NAA+NON-PROBE: NOT DETECTED
HBA1C MFR BLD: 6.5 % (ref 0–5.6)
HCT VFR BLD AUTO: 44.2 % (ref 35.8–46.3)
HCT VFR BLD AUTO: 45.5 % (ref 35.8–46.3)
HGB BLD-MCNC: 14.2 G/DL (ref 11.7–15.4)
HGB BLD-MCNC: 15.4 G/DL (ref 11.7–15.4)
HGB UR QL STRIP: NEGATIVE
IMM GRANULOCYTES # BLD AUTO: 0.2 K/UL (ref 0–0.5)
IMM GRANULOCYTES # BLD AUTO: 0.2 K/UL (ref 0–0.5)
IMM GRANULOCYTES NFR BLD AUTO: 1 % (ref 0–5)
IMM GRANULOCYTES NFR BLD AUTO: 1 % (ref 0–5)
K OXYTOCA DNA BLD POS QL NAA+NON-PROBE: NOT DETECTED
KETONES UR QL STRIP.AUTO: NEGATIVE MG/DL
KLEBSIELLA SP DNA BLD POS QL NAA+NON-PRB: NOT DETECTED
KLEBSIELLA SP DNA BLD POS QL NAA+NON-PRB: NOT DETECTED
L MONOCYTOG DNA BLD POS QL NAA+NON-PROBE: NOT DETECTED
LACTATE SERPL-SCNC: 1.6 MMOL/L (ref 0.5–2)
LEUKOCYTE ESTERASE UR QL STRIP.AUTO: NEGATIVE
LYMPHOCYTES # BLD: 0.7 K/UL (ref 0.5–4.6)
LYMPHOCYTES # BLD: 0.8 K/UL (ref 0.5–4.6)
LYMPHOCYTES NFR BLD: 3 % (ref 13–44)
LYMPHOCYTES NFR BLD: 4 % (ref 13–44)
MCH RBC QN AUTO: 31.2 PG (ref 26.1–32.9)
MCH RBC QN AUTO: 32 PG (ref 26.1–32.9)
MCHC RBC AUTO-ENTMCNC: 32.1 G/DL (ref 31.4–35)
MCHC RBC AUTO-ENTMCNC: 33.8 G/DL (ref 31.4–35)
MCV RBC AUTO: 94.4 FL (ref 82–102)
MCV RBC AUTO: 97.1 FL (ref 82–102)
MONOCYTES # BLD: 0.8 K/UL (ref 0.1–1.3)
MONOCYTES # BLD: 1.7 K/UL (ref 0.1–1.3)
MONOCYTES NFR BLD: 4 % (ref 4–12)
MONOCYTES NFR BLD: 7 % (ref 4–12)
N MEN DNA BLD POS QL NAA+NON-PROBE: NOT DETECTED
NEUTS SEG # BLD: 19.2 K/UL (ref 1.7–8.2)
NEUTS SEG # BLD: 20.3 K/UL (ref 1.7–8.2)
NEUTS SEG NFR BLD: 88 % (ref 43–78)
NEUTS SEG NFR BLD: 92 % (ref 43–78)
NITRITE UR QL STRIP.AUTO: NEGATIVE
NRBC # BLD: 0 K/UL (ref 0–0.2)
NRBC # BLD: 0 K/UL (ref 0–0.2)
P AERUGINOSA DNA BLD POS NAA+NON-PROBE: NOT DETECTED
PH UR STRIP: 5.5 (ref 5–9)
PLATELET # BLD AUTO: 67 K/UL (ref 150–450)
PLATELET # BLD AUTO: 80 K/UL (ref 150–450)
PMV BLD AUTO: 10.2 FL (ref 9.4–12.3)
PMV BLD AUTO: 10.7 FL (ref 9.4–12.3)
POTASSIUM SERPL-SCNC: 3.4 MMOL/L (ref 3.5–5.1)
PROCALCITONIN SERPL-MCNC: 3.25 NG/ML (ref 0–0.1)
PROT SERPL-MCNC: 7.4 G/DL (ref 6.3–8.2)
PROT UR STRIP-MCNC: NEGATIVE MG/DL
PROTEUS SP DNA BLD POS QL NAA+NON-PROBE: NOT DETECTED
RBC # BLD AUTO: 4.55 M/UL (ref 4.05–5.2)
RBC # BLD AUTO: 4.82 M/UL (ref 4.05–5.2)
RESISTANT GENE TARGETS: ABNORMAL
S AUREUS DNA BLD POS QL NAA+NON-PROBE: NOT DETECTED
S AUREUS+CONS DNA BLD POS NAA+NON-PROBE: NOT DETECTED
S EPIDERMIDIS DNA BLD POS QL NAA+NON-PRB: NOT DETECTED
S LUGDUNENSIS DNA BLD POS QL NAA+NON-PRB: NOT DETECTED
S MALTOPHILIA DNA BLD POS QL NAA+NON-PRB: NOT DETECTED
S MARCESCENS DNA BLD POS NAA+NON-PROBE: NOT DETECTED
S PNEUM DNA BLD POS QL NAA+NON-PROBE: NOT DETECTED
S PYO DNA BLD POS QL NAA+NON-PROBE: NOT DETECTED
SALMONELLA DNA BLD POS QL NAA+NON-PROBE: NOT DETECTED
SARS-COV-2 RDRP RESP QL NAA+PROBE: NOT DETECTED
SERVICE CMNT-IMP: ABNORMAL
SERVICE CMNT-IMP: ABNORMAL
SODIUM SERPL-SCNC: 140 MMOL/L (ref 136–145)
SOURCE: NORMAL
SP GR UR REFRACTOMETRY: 1.02 (ref 1–1.02)
STREPTOCOCCUS DNA BLD POS NAA+NON-PROBE: DETECTED
UROBILINOGEN UR QL STRIP.AUTO: 0.2 EU/DL (ref 0.2–1)
WBC # BLD AUTO: 20.9 K/UL (ref 4.3–11.1)
WBC # BLD AUTO: 23 K/UL (ref 4.3–11.1)

## 2024-07-28 PROCEDURE — 1100000003 HC PRIVATE W/ TELEMETRY

## 2024-07-28 PROCEDURE — 73630 X-RAY EXAM OF FOOT: CPT

## 2024-07-28 PROCEDURE — 6370000000 HC RX 637 (ALT 250 FOR IP): Performed by: HOSPITALIST

## 2024-07-28 PROCEDURE — 6360000002 HC RX W HCPCS: Performed by: EMERGENCY MEDICINE

## 2024-07-28 PROCEDURE — 83036 HEMOGLOBIN GLYCOSYLATED A1C: CPT

## 2024-07-28 PROCEDURE — 71045 X-RAY EXAM CHEST 1 VIEW: CPT

## 2024-07-28 PROCEDURE — 86140 C-REACTIVE PROTEIN: CPT

## 2024-07-28 PROCEDURE — 87040 BLOOD CULTURE FOR BACTERIA: CPT

## 2024-07-28 PROCEDURE — 97165 OT EVAL LOW COMPLEX 30 MIN: CPT

## 2024-07-28 PROCEDURE — 97535 SELF CARE MNGMENT TRAINING: CPT

## 2024-07-28 PROCEDURE — 6360000002 HC RX W HCPCS: Performed by: HOSPITALIST

## 2024-07-28 PROCEDURE — 97530 THERAPEUTIC ACTIVITIES: CPT

## 2024-07-28 PROCEDURE — 2580000003 HC RX 258: Performed by: EMERGENCY MEDICINE

## 2024-07-28 PROCEDURE — 87641 MR-STAPH DNA AMP PROBE: CPT

## 2024-07-28 PROCEDURE — 82962 GLUCOSE BLOOD TEST: CPT

## 2024-07-28 PROCEDURE — 85652 RBC SED RATE AUTOMATED: CPT

## 2024-07-28 PROCEDURE — 2580000003 HC RX 258: Performed by: HOSPITALIST

## 2024-07-28 PROCEDURE — 97161 PT EVAL LOW COMPLEX 20 MIN: CPT

## 2024-07-28 PROCEDURE — 36415 COLL VENOUS BLD VENIPUNCTURE: CPT

## 2024-07-28 PROCEDURE — 85025 COMPLETE CBC W/AUTO DIFF WBC: CPT

## 2024-07-28 RX ORDER — CITALOPRAM HYDROBROMIDE 10 MG/1
20 TABLET ORAL DAILY
Status: DISCONTINUED | OUTPATIENT
Start: 2024-07-28 | End: 2024-07-31 | Stop reason: HOSPADM

## 2024-07-28 RX ORDER — SODIUM CHLORIDE 0.9 % (FLUSH) 0.9 %
5-40 SYRINGE (ML) INJECTION EVERY 12 HOURS SCHEDULED
Status: DISCONTINUED | OUTPATIENT
Start: 2024-07-28 | End: 2024-07-31 | Stop reason: HOSPADM

## 2024-07-28 RX ORDER — INSULIN LISPRO 100 [IU]/ML
0-8 INJECTION, SOLUTION INTRAVENOUS; SUBCUTANEOUS
Status: DISCONTINUED | OUTPATIENT
Start: 2024-07-28 | End: 2024-07-31 | Stop reason: HOSPADM

## 2024-07-28 RX ORDER — ENOXAPARIN SODIUM 100 MG/ML
40 INJECTION SUBCUTANEOUS DAILY
Status: DISCONTINUED | OUTPATIENT
Start: 2024-07-28 | End: 2024-07-28 | Stop reason: SDUPTHER

## 2024-07-28 RX ORDER — ACETAMINOPHEN 325 MG/1
650 TABLET ORAL EVERY 6 HOURS PRN
Status: DISCONTINUED | OUTPATIENT
Start: 2024-07-28 | End: 2024-07-31 | Stop reason: HOSPADM

## 2024-07-28 RX ORDER — ACETAMINOPHEN 650 MG/1
650 SUPPOSITORY RECTAL EVERY 6 HOURS PRN
Status: DISCONTINUED | OUTPATIENT
Start: 2024-07-28 | End: 2024-07-31 | Stop reason: HOSPADM

## 2024-07-28 RX ORDER — MAGNESIUM SULFATE IN WATER 40 MG/ML
2000 INJECTION, SOLUTION INTRAVENOUS PRN
Status: DISCONTINUED | OUTPATIENT
Start: 2024-07-28 | End: 2024-07-31 | Stop reason: HOSPADM

## 2024-07-28 RX ORDER — INSULIN LISPRO 100 [IU]/ML
0-4 INJECTION, SOLUTION INTRAVENOUS; SUBCUTANEOUS NIGHTLY
Status: DISCONTINUED | OUTPATIENT
Start: 2024-07-28 | End: 2024-07-31 | Stop reason: HOSPADM

## 2024-07-28 RX ORDER — 0.9 % SODIUM CHLORIDE 0.9 %
30 INTRAVENOUS SOLUTION INTRAVENOUS
Status: COMPLETED | OUTPATIENT
Start: 2024-07-28 | End: 2024-07-28

## 2024-07-28 RX ORDER — POLYETHYLENE GLYCOL 3350 17 G/17G
17 POWDER, FOR SOLUTION ORAL DAILY PRN
Status: DISCONTINUED | OUTPATIENT
Start: 2024-07-28 | End: 2024-07-31 | Stop reason: HOSPADM

## 2024-07-28 RX ORDER — SODIUM CHLORIDE 0.9 % (FLUSH) 0.9 %
5-40 SYRINGE (ML) INJECTION PRN
Status: DISCONTINUED | OUTPATIENT
Start: 2024-07-28 | End: 2024-07-31 | Stop reason: HOSPADM

## 2024-07-28 RX ORDER — ISOSORBIDE MONONITRATE 30 MG/1
30 TABLET, EXTENDED RELEASE ORAL DAILY
Status: DISCONTINUED | OUTPATIENT
Start: 2024-07-28 | End: 2024-07-31 | Stop reason: HOSPADM

## 2024-07-28 RX ORDER — ONDANSETRON 2 MG/ML
4 INJECTION INTRAMUSCULAR; INTRAVENOUS EVERY 6 HOURS PRN
Status: DISCONTINUED | OUTPATIENT
Start: 2024-07-28 | End: 2024-07-31 | Stop reason: HOSPADM

## 2024-07-28 RX ORDER — SODIUM CHLORIDE 9 MG/ML
INJECTION, SOLUTION INTRAVENOUS PRN
Status: DISCONTINUED | OUTPATIENT
Start: 2024-07-28 | End: 2024-07-31 | Stop reason: HOSPADM

## 2024-07-28 RX ORDER — LACTOBACILLUS RHAMNOSUS GG 10B CELL
1 CAPSULE ORAL
Status: DISCONTINUED | OUTPATIENT
Start: 2024-07-28 | End: 2024-07-31 | Stop reason: HOSPADM

## 2024-07-28 RX ORDER — POTASSIUM CHLORIDE 7.45 MG/ML
10 INJECTION INTRAVENOUS PRN
Status: DISCONTINUED | OUTPATIENT
Start: 2024-07-28 | End: 2024-07-31 | Stop reason: HOSPADM

## 2024-07-28 RX ORDER — POTASSIUM CHLORIDE 20 MEQ/1
40 TABLET, EXTENDED RELEASE ORAL PRN
Status: DISCONTINUED | OUTPATIENT
Start: 2024-07-28 | End: 2024-07-31 | Stop reason: HOSPADM

## 2024-07-28 RX ORDER — ONDANSETRON 4 MG/1
4 TABLET, ORALLY DISINTEGRATING ORAL EVERY 8 HOURS PRN
Status: DISCONTINUED | OUTPATIENT
Start: 2024-07-28 | End: 2024-07-31 | Stop reason: HOSPADM

## 2024-07-28 RX ORDER — LISINOPRIL 20 MG/1
20 TABLET ORAL DAILY
Status: DISCONTINUED | OUTPATIENT
Start: 2024-07-28 | End: 2024-07-31 | Stop reason: HOSPADM

## 2024-07-28 RX ORDER — SODIUM CHLORIDE 9 MG/ML
INJECTION, SOLUTION INTRAVENOUS CONTINUOUS
Status: DISCONTINUED | OUTPATIENT
Start: 2024-07-28 | End: 2024-07-28

## 2024-07-28 RX ADMIN — SODIUM CHLORIDE, PRESERVATIVE FREE 10 ML: 5 INJECTION INTRAVENOUS at 21:57

## 2024-07-28 RX ADMIN — SODIUM CHLORIDE 1869 ML: 9 INJECTION, SOLUTION INTRAVENOUS at 00:37

## 2024-07-28 RX ADMIN — ACETAMINOPHEN 650 MG: 325 TABLET ORAL at 03:28

## 2024-07-28 RX ADMIN — DRONEDARONE 400 MG: 400 TABLET, FILM COATED ORAL at 17:39

## 2024-07-28 RX ADMIN — APIXABAN 5 MG: 5 TABLET, FILM COATED ORAL at 09:56

## 2024-07-28 RX ADMIN — APIXABAN 5 MG: 5 TABLET, FILM COATED ORAL at 21:50

## 2024-07-28 RX ADMIN — CEFEPIME 2000 MG: 2 INJECTION, POWDER, FOR SOLUTION INTRAVENOUS at 00:39

## 2024-07-28 RX ADMIN — CITALOPRAM HYDROBROMIDE 20 MG: 10 TABLET ORAL at 21:50

## 2024-07-28 RX ADMIN — SODIUM CHLORIDE, PRESERVATIVE FREE 10 ML: 5 INJECTION INTRAVENOUS at 09:57

## 2024-07-28 RX ADMIN — CEFEPIME 2000 MG: 2 INJECTION, POWDER, FOR SOLUTION INTRAVENOUS at 14:31

## 2024-07-28 RX ADMIN — SODIUM CHLORIDE: 9 INJECTION, SOLUTION INTRAVENOUS at 04:01

## 2024-07-28 RX ADMIN — VANCOMYCIN HYDROCHLORIDE 2000 MG: 10 INJECTION, POWDER, LYOPHILIZED, FOR SOLUTION INTRAVENOUS at 00:45

## 2024-07-28 RX ADMIN — POTASSIUM BICARBONATE 40 MEQ: 782 TABLET, EFFERVESCENT ORAL at 06:32

## 2024-07-28 RX ADMIN — DRONEDARONE 400 MG: 400 TABLET, FILM COATED ORAL at 09:55

## 2024-07-28 ASSESSMENT — LIFESTYLE VARIABLES
HOW OFTEN DO YOU HAVE A DRINK CONTAINING ALCOHOL: NEVER
HOW MANY STANDARD DRINKS CONTAINING ALCOHOL DO YOU HAVE ON A TYPICAL DAY: PATIENT DOES NOT DRINK

## 2024-07-28 ASSESSMENT — PAIN SCALES - GENERAL: PAINLEVEL_OUTOF10: 1

## 2024-07-28 NOTE — ED TRIAGE NOTES
C/o fever \"all afternoon\" today, urine incontinence today, Denies abd or back pain, no urinary odor/burning.     PMH: HTN, DMII, Afib

## 2024-07-28 NOTE — THERAPY EVALUATION
ACUTE OCCUPATIONAL THERAPY GOALS:   (Developed with and agreed upon by patient and/or caregiver.)  (1.) Jacqueline Tay will complete functional mobility of household distance with SUPERVISION and adaptive equipment as needed.   (2.) Jacqueline Tay will complete functional ADL task standing at sink SUPERVISION and adaptive equipment as needed.     ABOVE GOALS MET UPON INITIAL EVALUATION 7/28/24       OCCUPATIONAL THERAPY Initial Assessment, Daily Note, and Discharge       OT Visit Days: 1  Acknowledge Orders  Time  OT Charge Capture  Rehab Caseload Tracker      Jacqueline Tay is a 73 y.o. female   PRIMARY DIAGNOSIS: Sepsis (HCC)  Septicemia (HCC) [A41.9]  Systemic inflammatory response syndrome (HCC) [R65.10]       Reason for Referral: Generalized Muscle Weakness (M62.81)  Inpatient: Payor: MEDICARE / Plan: MEDICARE PART A AND B / Product Type: *No Product type* /     ASSESSMENT:     REHAB RECOMMENDATIONS:   Recommendation to date pending progress:  Setting:  No further skilled occupational therapy after discharge from hospital    Equipment:    None     ASSESSMENT:  Ms. Tay is a 74 y/o female who presents with fevers, fatigue, and urinary incontinence; pt admitted with sepsis and undergoing workup for source. PMHx of R great toe amputation last year. At baseline pt lives with her , is independent with ADLs, and does not use any AD for mobility. Pt states that she does have a few walking sticks/canes at home but does not regularly use.    This date, pt presented supine and agreeable to session. Pt overall SBA progressing to supervision for functional transfers and mobility of extended household distance without AD. Pt noted to reach out for handrail at times but states this is typical for her. Pt also completed grooming tasks standing at sink with supervision. Pt left seated in bedside chair with all needs met. Today pt presents functioning at her baseline and has no further acute OT needs. Will d/c from

## 2024-07-28 NOTE — ED NOTES
TRANSFER - OUT REPORT:    Verbal report given to April RN  on Jacqueline Tay  being transferred to room 625 6th floor  for routine progression of patient care       Report consisted of patient's Situation, Background, Assessment and   Recommendations(SBAR).     Information from the following report(s) ED SBAR was reviewed with the receiving nurse.    Johnson Fall Assessment:    Presents to emergency department  because of falls (Syncope, seizure, or loss of consciousness): No  Age > 70: Yes  Altered Mental Status, Intoxication with alcohol or substance confusion (Disorientation, impaired judgment, poor safety awaremess, or inability to follow instructions): No  Impaired Mobility: Ambulates or transfers with assistive devices or assistance; Unable to ambulate or transer.: No             Lines:   Peripheral IV 07/27/24 Right;Dorsal Forearm (Active)   Site Assessment Clean, dry & intact 07/27/24 2344   Line Status Blood return noted;Flushed;Normal saline locked 07/27/24 2344   Line Care Connections checked and tightened 07/27/24 2344   Phlebitis Assessment No symptoms 07/27/24 2344   Infiltration Assessment 0 07/27/24 2344   Dressing Status New dressing applied;Clean, dry & intact 07/27/24 2344   Dressing Type Transparent 07/27/24 2344   Dressing Intervention New 07/27/24 2344        Opportunity for questions and clarification was provided.      Patient transported with:  Registered Nurse          Jerrica Ivey RN  07/28/24 9689

## 2024-07-28 NOTE — ED PROVIDER NOTES
Emergency Department Provider Note       PCP: Ned Love MD   Age: 73 y.o.   Sex: female     DISPOSITION Decision To Admit 07/28/2024 01:57:40 AM       ICD-10-CM    1. Septicemia (HCC)  A41.9           Medical Decision Making     73-year-old female with history of A-fib on Eliquis and Multaq as well as diabetes presents to the emergency department complaining of generalized fatigue, fevers to 103, difficulty with concentration and generalized weakness starting earlier today.  No URI symptoms and on exam has no focal deficits.  Did complain of some urinary incontinence today as well which is new for her.  She denies any abdominal pain.  Exam is nonfocal.  Lab work revealed a white blood cell count of 23,000 with a left shift and an elevated procalcitonin at 3.25.  Lactic acid was normal at 1.6 and CMP was only abnormal for a potassium of 3.4, glucose of 184, BUN of 28 with a creatinine of 0.95.  Patient was treated as suspected sepsis with IV fluids at 30 mL/kg of adjusted weight and screened with COVID as well which was negative.  She was given Zosyn and vancomycin for sepsis of unclear origin.  Chest x-ray revealed no evidence of acute cardiothoracic process.  On recheck after fluids and while receiving antibiotics, the patient was moderately improved.  Due to the elevated white count, elevated procalcitonin, and borderline blood pressures, the case was discussed with the hospitalist will admit.  ED Course as of 07/28/24 0157   Sun Jul 28, 2024   0014 ECG interpretation for ECG dated 7/27/2024 at 11:49 PM: ECG reveals a normal sinus rhythm at a rate 60 bpm with normal UT and mildly prolonged QTc interval with normal axis.  Unremarkable ECG.  Sachin Balderrama MD   [BB]      ED Course User Index  [BB] Sachin Balderrama MD     1 or more acute illnesses that pose a threat to life or bodily function.   Chronic medical problems impacting care include diabetes, atrial fibrillation, HTN.    I independently

## 2024-07-28 NOTE — PLAN OF CARE
Problem: Discharge Planning  Goal: Discharge to home or other facility with appropriate resources  7/28/2024 0439 by Alyson Blair RN  Outcome: Progressing  7/28/2024 0427 by Alyson Blair RN  Outcome: Progressing     Problem: Safety - Adult  Goal: Free from fall injury  7/28/2024 0439 by Alyson Blair RN  Outcome: Progressing  7/28/2024 0427 by Alyson Blair RN  Outcome: Progressing     Problem: ABCDS Injury Assessment  Goal: Absence of physical injury  7/28/2024 0439 by Alyson Blair RN  Outcome: Progressing  7/28/2024 0427 by Alyson Blair RN  Outcome: Progressing

## 2024-07-28 NOTE — H&P
Hospitalist History and Physical   Admit Date:  2024 11:38 PM   Name:  Jacqueline Tay   Age:  73 y.o.  Sex:  female  :  1950   MRN:  [id number]   Room:  ER02/02    Presenting/Chief Complaint: Fever     Reason(s) for Admission: Systemic inflammatory response syndrome (HCC) [R65.10]     History of Present Illness:     70 years old female with history of atrial fibrillation on Eliquis, tach, diabetes type 2, hypertension presented to emergency room with chief complaint of fever of 103, generalized fatigue going on for 1 day.  Denies any sore throat, runny nose, shortness of breath, burning urination but she had 1 episode of urinary incontinence today.  Patient's temperature was 99.1 initially with blood pressure of 106/50.  Lab work shows evidence of elevated white count at 23,000.  Lactic acid is 1.6.  Procalcitonin is 3.2.  Patient was given broad-spectrum antibiotics.  Patient denies any headache, neck stiffness.  ER physician requested admission of this patient for further evaluation and management.            Assessment & Plan:   Hospital problems:    Systemic inflammatory spine syndrome: Source of patient's fever and elevated white count, elevated procalcitonin unclear.  Urinalysis did not show any evidence of UTI, chest x-ray did not show any evidence of infiltrates.  Initiated on cefepime, vancomycin.  Patient has a wound on left great toe which looks healing does not look infected.  I will obtain x-ray of left foot for further evaluation.    Paroxysmal atrial fibrillation: On anticoagulation, continue on anticoagulation, rate is controlled, continue on Multaq.    Diabetes type 2: On Jardiance at home, will place on sliding scale insulin here.    Hypertension: Will continue on benazepril.            Diet: ADULT DIET; Regular  VTE prophylaxis: Already on anticoagulation  Code status: Full Code      Non-peripheral Lines and Tubes (if present):                 Objective:   Patient Vitals for the

## 2024-07-29 ENCOUNTER — APPOINTMENT (OUTPATIENT)
Dept: NON INVASIVE DIAGNOSTICS | Age: 74
DRG: 872 | End: 2024-07-29
Attending: FAMILY MEDICINE
Payer: MEDICARE

## 2024-07-29 ENCOUNTER — APPOINTMENT (OUTPATIENT)
Dept: MRI IMAGING | Age: 74
DRG: 872 | End: 2024-07-29
Payer: MEDICARE

## 2024-07-29 PROBLEM — D69.6 THROMBOCYTOPENIA (HCC): Status: ACTIVE | Noted: 2024-07-29

## 2024-07-29 PROBLEM — I05.8 MITRAL VALVE MASS: Status: ACTIVE | Noted: 2024-07-29

## 2024-07-29 PROBLEM — I48.91 ATRIAL FIBRILLATION WITH RVR (HCC): Status: ACTIVE | Noted: 2024-07-29

## 2024-07-29 LAB
ALBUMIN SERPL-MCNC: 2.8 G/DL (ref 3.2–4.6)
ALBUMIN/GLOB SERPL: 0.9 (ref 1–1.9)
ALP SERPL-CCNC: 48 U/L (ref 35–104)
ALT SERPL-CCNC: 17 U/L (ref 12–65)
ANION GAP SERPL CALC-SCNC: 11 MMOL/L (ref 9–18)
AST SERPL-CCNC: 22 U/L (ref 15–37)
BASOPHILS # BLD: 0 K/UL (ref 0–0.2)
BASOPHILS NFR BLD: 0 % (ref 0–2)
BILIRUB SERPL-MCNC: 0.7 MG/DL (ref 0–1.2)
BUN SERPL-MCNC: 15 MG/DL (ref 8–23)
CALCIUM SERPL-MCNC: 8 MG/DL (ref 8.8–10.2)
CHLORIDE SERPL-SCNC: 105 MMOL/L (ref 98–107)
CO2 SERPL-SCNC: 21 MMOL/L (ref 20–28)
CREAT SERPL-MCNC: 0.58 MG/DL (ref 0.6–1.1)
DIFFERENTIAL METHOD BLD: ABNORMAL
ECHO AO ASC DIAM: 4.3 CM
ECHO AO ASCENDING AORTA INDEX: 2.36 CM/M2
ECHO AO ROOT DIAM: 2.8 CM
ECHO AO ROOT INDEX: 1.54 CM/M2
ECHO AV AREA PEAK VELOCITY: 1.6 CM2
ECHO AV AREA VTI: 1.7 CM2
ECHO AV AREA/BSA PEAK VELOCITY: 0.9 CM2/M2
ECHO AV AREA/BSA VTI: 0.9 CM2/M2
ECHO AV MEAN GRADIENT: 7 MMHG
ECHO AV MEAN VELOCITY: 1.2 M/S
ECHO AV PEAK GRADIENT: 14 MMHG
ECHO AV PEAK VELOCITY: 1.9 M/S
ECHO AV VELOCITY RATIO: 0.53
ECHO AV VTI: 38.8 CM
ECHO BSA: 1.88 M2
ECHO EST RA PRESSURE: 8 MMHG
ECHO IVC PROX: 2.6 CM
ECHO LA AREA 2C: 20.1 CM2
ECHO LA AREA 4C: 27.6 CM2
ECHO LA DIAMETER INDEX: 2.25 CM/M2
ECHO LA DIAMETER: 4.1 CM
ECHO LA MAJOR AXIS: 6.2 CM
ECHO LA MINOR AXIS: 5.9 CM
ECHO LA TO AORTIC ROOT RATIO: 1.46
ECHO LA VOL BP: 75 ML (ref 22–52)
ECHO LA VOL MOD A2C: 57 ML (ref 22–52)
ECHO LA VOL MOD A4C: 95 ML (ref 22–52)
ECHO LA VOL/BSA BIPLANE: 41 ML/M2 (ref 16–34)
ECHO LA VOLUME INDEX MOD A2C: 31 ML/M2 (ref 16–34)
ECHO LA VOLUME INDEX MOD A4C: 52 ML/M2 (ref 16–34)
ECHO LV E' LATERAL VELOCITY: 9 CM/S
ECHO LV E' SEPTAL VELOCITY: 13 CM/S
ECHO LV EDV A2C: 95 ML
ECHO LV EDV A4C: 128 ML
ECHO LV EDV INDEX A4C: 70 ML/M2
ECHO LV EDV NDEX A2C: 52 ML/M2
ECHO LV EJECTION FRACTION A2C: 60 %
ECHO LV EJECTION FRACTION A4C: 58 %
ECHO LV EJECTION FRACTION BIPLANE: 57 % (ref 55–100)
ECHO LV ESV A2C: 39 ML
ECHO LV ESV A4C: 54 ML
ECHO LV ESV INDEX A2C: 21 ML/M2
ECHO LV ESV INDEX A4C: 30 ML/M2
ECHO LV FRACTIONAL SHORTENING: 45 % (ref 28–44)
ECHO LV INTERNAL DIMENSION DIASTOLE INDEX: 2.42 CM/M2
ECHO LV INTERNAL DIMENSION DIASTOLIC: 4.4 CM (ref 3.9–5.3)
ECHO LV INTERNAL DIMENSION SYSTOLIC INDEX: 1.32 CM/M2
ECHO LV INTERNAL DIMENSION SYSTOLIC: 2.4 CM
ECHO LV IVSD: 1.2 CM (ref 0.6–0.9)
ECHO LV MASS 2D: 158.2 G (ref 67–162)
ECHO LV MASS INDEX 2D: 86.9 G/M2 (ref 43–95)
ECHO LV POSTERIOR WALL DIASTOLIC: 0.9 CM (ref 0.6–0.9)
ECHO LV RELATIVE WALL THICKNESS RATIO: 0.41
ECHO LVOT AREA: 3.1 CM2
ECHO LVOT AV VTI INDEX: 0.53
ECHO LVOT DIAM: 2 CM
ECHO LVOT MEAN GRADIENT: 2 MMHG
ECHO LVOT PEAK GRADIENT: 4 MMHG
ECHO LVOT PEAK VELOCITY: 1 M/S
ECHO LVOT STROKE VOLUME INDEX: 35.5 ML/M2
ECHO LVOT SV: 64.7 ML
ECHO LVOT VTI: 20.6 CM
ECHO MV A VELOCITY: 0.45 M/S
ECHO MV E DECELERATION TIME (DT): 298 MS
ECHO MV E VELOCITY: 1.09 M/S
ECHO MV E/A RATIO: 2.42
ECHO MV E/E' LATERAL: 12.11
ECHO MV E/E' RATIO (AVERAGED): 10.25
ECHO MV E/E' SEPTAL: 8.38
ECHO PULMONARY ARTERY END DIASTOLIC PRESSURE: 3 MMHG
ECHO PV ACCELERATION TIME (AT): 98 MS
ECHO PV MAX VELOCITY: 1.1 M/S
ECHO PV PEAK GRADIENT: 5 MMHG
ECHO PV REGURGITANT MAX VELOCITY: 0.8 M/S
ECHO RIGHT VENTRICULAR SYSTOLIC PRESSURE (RVSP): 37 MMHG
ECHO RV BASAL DIMENSION: 4.1 CM
ECHO RV FREE WALL PEAK S': 18 CM/S
ECHO RV INTERNAL DIMENSION: 2.9 CM
ECHO RV TAPSE: 2.3 CM (ref 1.7–?)
ECHO TV REGURGITANT MAX VELOCITY: 2.68 M/S
ECHO TV REGURGITANT PEAK GRADIENT: 29 MMHG
EOSINOPHIL # BLD: 0 K/UL (ref 0–0.8)
EOSINOPHIL NFR BLD: 0 % (ref 0.5–7.8)
ERYTHROCYTE [DISTWIDTH] IN BLOOD BY AUTOMATED COUNT: 15.2 % (ref 11.9–14.6)
GLOBULIN SER CALC-MCNC: 3 G/DL (ref 2.3–3.5)
GLUCOSE BLD STRIP.AUTO-MCNC: 109 MG/DL (ref 65–100)
GLUCOSE BLD STRIP.AUTO-MCNC: 116 MG/DL (ref 65–100)
GLUCOSE BLD STRIP.AUTO-MCNC: 150 MG/DL (ref 65–100)
GLUCOSE BLD STRIP.AUTO-MCNC: 96 MG/DL (ref 65–100)
GLUCOSE SERPL-MCNC: 123 MG/DL (ref 70–99)
HCT VFR BLD AUTO: 41.1 % (ref 35.8–46.3)
HGB BLD-MCNC: 13.5 G/DL (ref 11.7–15.4)
IMM GRANULOCYTES # BLD AUTO: 0.1 K/UL (ref 0–0.5)
IMM GRANULOCYTES NFR BLD AUTO: 0 % (ref 0–5)
LYMPHOCYTES # BLD: 1.2 K/UL (ref 0.5–4.6)
LYMPHOCYTES NFR BLD: 10 % (ref 13–44)
MAGNESIUM SERPL-MCNC: 1.9 MG/DL (ref 1.8–2.4)
MCH RBC QN AUTO: 31.3 PG (ref 26.1–32.9)
MCHC RBC AUTO-ENTMCNC: 32.8 G/DL (ref 31.4–35)
MCV RBC AUTO: 95.4 FL (ref 82–102)
MONOCYTES # BLD: 0.8 K/UL (ref 0.1–1.3)
MONOCYTES NFR BLD: 6 % (ref 4–12)
MRSA DNA SPEC QL NAA+PROBE: NOT DETECTED
NEUTS SEG # BLD: 10.1 K/UL (ref 1.7–8.2)
NEUTS SEG NFR BLD: 83 % (ref 43–78)
NRBC # BLD: 0 K/UL (ref 0–0.2)
PLATELET # BLD AUTO: 59 K/UL (ref 150–450)
PMV BLD AUTO: 10.9 FL (ref 9.4–12.3)
POTASSIUM SERPL-SCNC: 3.5 MMOL/L (ref 3.5–5.1)
PROCALCITONIN SERPL-MCNC: 2.74 NG/ML (ref 0–0.1)
PROT SERPL-MCNC: 5.8 G/DL (ref 6.3–8.2)
RBC # BLD AUTO: 4.31 M/UL (ref 4.05–5.2)
S AUREUS CPE NOSE QL NAA+PROBE: NOT DETECTED
SERVICE CMNT-IMP: ABNORMAL
SERVICE CMNT-IMP: NORMAL
SODIUM SERPL-SCNC: 138 MMOL/L (ref 136–145)
VANCOMYCIN SERPL-MCNC: 15.2 UG/ML
WBC # BLD AUTO: 12.2 K/UL (ref 4.3–11.1)

## 2024-07-29 PROCEDURE — 6370000000 HC RX 637 (ALT 250 FOR IP): Performed by: FAMILY MEDICINE

## 2024-07-29 PROCEDURE — 6360000004 HC RX CONTRAST MEDICATION: Performed by: FAMILY MEDICINE

## 2024-07-29 PROCEDURE — 83735 ASSAY OF MAGNESIUM: CPT

## 2024-07-29 PROCEDURE — 2580000003 HC RX 258: Performed by: FAMILY MEDICINE

## 2024-07-29 PROCEDURE — 2580000003 HC RX 258: Performed by: HOSPITALIST

## 2024-07-29 PROCEDURE — 93306 TTE W/DOPPLER COMPLETE: CPT | Performed by: INTERNAL MEDICINE

## 2024-07-29 PROCEDURE — 2580000003 HC RX 258: Performed by: INTERNAL MEDICINE

## 2024-07-29 PROCEDURE — 87040 BLOOD CULTURE FOR BACTERIA: CPT

## 2024-07-29 PROCEDURE — 6370000000 HC RX 637 (ALT 250 FOR IP): Performed by: HOSPITALIST

## 2024-07-29 PROCEDURE — 6360000002 HC RX W HCPCS: Performed by: HOSPITALIST

## 2024-07-29 PROCEDURE — 6360000002 HC RX W HCPCS: Performed by: FAMILY MEDICINE

## 2024-07-29 PROCEDURE — 2500000003 HC RX 250 WO HCPCS: Performed by: FAMILY MEDICINE

## 2024-07-29 PROCEDURE — A9579 GAD-BASE MR CONTRAST NOS,1ML: HCPCS | Performed by: FAMILY MEDICINE

## 2024-07-29 PROCEDURE — 1100000003 HC PRIVATE W/ TELEMETRY

## 2024-07-29 PROCEDURE — 85025 COMPLETE CBC W/AUTO DIFF WBC: CPT

## 2024-07-29 PROCEDURE — 84145 PROCALCITONIN (PCT): CPT

## 2024-07-29 PROCEDURE — 6360000002 HC RX W HCPCS: Performed by: INTERNAL MEDICINE

## 2024-07-29 PROCEDURE — 82962 GLUCOSE BLOOD TEST: CPT

## 2024-07-29 PROCEDURE — 93306 TTE W/DOPPLER COMPLETE: CPT

## 2024-07-29 PROCEDURE — 73720 MRI LWR EXTREMITY W/O&W/DYE: CPT

## 2024-07-29 PROCEDURE — 6370000000 HC RX 637 (ALT 250 FOR IP): Performed by: PHYSICIAN ASSISTANT

## 2024-07-29 PROCEDURE — 80202 ASSAY OF VANCOMYCIN: CPT

## 2024-07-29 PROCEDURE — 93005 ELECTROCARDIOGRAM TRACING: CPT | Performed by: PHYSICIAN ASSISTANT

## 2024-07-29 PROCEDURE — 36415 COLL VENOUS BLD VENIPUNCTURE: CPT

## 2024-07-29 PROCEDURE — 80053 COMPREHEN METABOLIC PANEL: CPT

## 2024-07-29 PROCEDURE — 94660 CPAP INITIATION&MGMT: CPT

## 2024-07-29 RX ORDER — LANOLIN ALCOHOL/MO/W.PET/CERES
400 CREAM (GRAM) TOPICAL DAILY
Status: DISCONTINUED | OUTPATIENT
Start: 2024-07-29 | End: 2024-07-31 | Stop reason: HOSPADM

## 2024-07-29 RX ORDER — SODIUM CHLORIDE 0.9 % (FLUSH) 0.9 %
10 SYRINGE (ML) INJECTION AS NEEDED
Status: DISCONTINUED | OUTPATIENT
Start: 2024-07-29 | End: 2024-07-31 | Stop reason: HOSPADM

## 2024-07-29 RX ORDER — IBUPROFEN 600 MG/1
1 TABLET ORAL PRN
Status: DISCONTINUED | OUTPATIENT
Start: 2024-07-29 | End: 2024-07-31 | Stop reason: HOSPADM

## 2024-07-29 RX ORDER — METOPROLOL TARTRATE 1 MG/ML
2.5 INJECTION, SOLUTION INTRAVENOUS ONCE
Status: COMPLETED | OUTPATIENT
Start: 2024-07-29 | End: 2024-07-29

## 2024-07-29 RX ORDER — DEXTROSE MONOHYDRATE 100 MG/ML
INJECTION, SOLUTION INTRAVENOUS CONTINUOUS PRN
Status: DISCONTINUED | OUTPATIENT
Start: 2024-07-29 | End: 2024-07-31 | Stop reason: HOSPADM

## 2024-07-29 RX ORDER — POTASSIUM CHLORIDE 20 MEQ/1
20 TABLET, EXTENDED RELEASE ORAL ONCE
Status: COMPLETED | OUTPATIENT
Start: 2024-07-29 | End: 2024-07-29

## 2024-07-29 RX ADMIN — DRONEDARONE 400 MG: 400 TABLET, FILM COATED ORAL at 09:02

## 2024-07-29 RX ADMIN — CEFEPIME 2000 MG: 2 INJECTION, POWDER, FOR SOLUTION INTRAVENOUS at 10:21

## 2024-07-29 RX ADMIN — Medication 1 CAPSULE: at 09:03

## 2024-07-29 RX ADMIN — MAGNESIUM GLUCONATE 500 MG ORAL TABLET 400 MG: 500 TABLET ORAL at 16:49

## 2024-07-29 RX ADMIN — SODIUM CHLORIDE, PRESERVATIVE FREE 10 ML: 5 INJECTION INTRAVENOUS at 09:03

## 2024-07-29 RX ADMIN — SODIUM CHLORIDE, PRESERVATIVE FREE 10 ML: 5 INJECTION INTRAVENOUS at 20:09

## 2024-07-29 RX ADMIN — APIXABAN 5 MG: 5 TABLET, FILM COATED ORAL at 09:03

## 2024-07-29 RX ADMIN — LISINOPRIL 20 MG: 20 TABLET ORAL at 09:03

## 2024-07-29 RX ADMIN — CITALOPRAM HYDROBROMIDE 20 MG: 10 TABLET ORAL at 21:51

## 2024-07-29 RX ADMIN — CEFAZOLIN SODIUM 2000 MG: 100 INJECTION, POWDER, LYOPHILIZED, FOR SOLUTION INTRAVENOUS at 21:52

## 2024-07-29 RX ADMIN — GADOTERIDOL 16 ML: 279.3 INJECTION, SOLUTION INTRAVENOUS at 20:09

## 2024-07-29 RX ADMIN — CEFEPIME 2000 MG: 2 INJECTION, POWDER, FOR SOLUTION INTRAVENOUS at 00:12

## 2024-07-29 RX ADMIN — VANCOMYCIN HYDROCHLORIDE 1500 MG: 10 INJECTION, POWDER, LYOPHILIZED, FOR SOLUTION INTRAVENOUS at 00:42

## 2024-07-29 RX ADMIN — SODIUM CHLORIDE: 9 INJECTION, SOLUTION INTRAVENOUS at 00:10

## 2024-07-29 RX ADMIN — METOROPROLOL TARTRATE 2.5 MG: 5 INJECTION, SOLUTION INTRAVENOUS at 14:52

## 2024-07-29 RX ADMIN — VANCOMYCIN HYDROCHLORIDE 1000 MG: 1 INJECTION, POWDER, LYOPHILIZED, FOR SOLUTION INTRAVENOUS at 10:22

## 2024-07-29 RX ADMIN — DRONEDARONE 400 MG: 400 TABLET, FILM COATED ORAL at 16:49

## 2024-07-29 RX ADMIN — APIXABAN 5 MG: 5 TABLET, FILM COATED ORAL at 21:52

## 2024-07-29 RX ADMIN — SODIUM CHLORIDE: 9 INJECTION, SOLUTION INTRAVENOUS at 10:20

## 2024-07-29 RX ADMIN — SODIUM CHLORIDE, PRESERVATIVE FREE 10 ML: 5 INJECTION INTRAVENOUS at 22:06

## 2024-07-29 RX ADMIN — POTASSIUM CHLORIDE 20 MEQ: 1500 TABLET, EXTENDED RELEASE ORAL at 16:49

## 2024-07-29 RX ADMIN — ISOSORBIDE MONONITRATE 30 MG: 30 TABLET, EXTENDED RELEASE ORAL at 09:02

## 2024-07-29 NOTE — CONSULTS
[facility] Cardiology Evaluation      Date of  Admission: 7/27/2024 11:38 PM     Primary Care Physician: Ned Love MD  Primary Cardiologist: Dr Noel  Referring Physician: Dr Peterson  Attending Physician: Dr Hernandez    CC: a fib    Jacqueline Tay is a 73 y.o. female admitted for Septicemia (HCC) [A41.9]  Systemic inflammatory response syndrome (HCC) [R65.10].  She has a h/o ALCIDES using CPAP, PAF on BB/flecainide/eliquis, DM, hld, and htn who presented to the ER 7/28/24 w fever.  Admitted by hospitalist for SIRS and started on antibiotics.      Today , K 3.5, cr .58, mag 1.9, albumin 2.8, WBC 12, hgb 13.5, platelets 59, BC positive strep agalactiae sero group B, viral panel neg, no initial EKG.  Had been NSR on tele, now a fib w rate 150-180. /90, temp 100.7 yesterday.  7/29/24 TTE w EF 55%, small mobile echo density posterior leaflet atrial aspect MV, LA mildly dilated.     Pt denies any missed doses of meds, no CP, SOB, dizziness or syncope.  LE edema unchanged.  She has noted palpitations today in a fib. Nursing going to give Lopressor 2.5 mg IV.     Cardiac history:    01/2014 echocardiogram - normal.      Atrial fibrillation during emergency room visit 02/2019.      02/2019 initiated on Metoprolol and Flecainide.      Stress perfusion study 2's 2019 lateral defect moderate risk and    Cath 2019 distical circumflex vessel 2 mm too small for intervention    Flecainide change to Multitaq with improvement of symptoms 3/27/2013    7/31/2020 Sinus  Bradycardia WITHIN NORMAL LIMITS  6/13/2023 sinus rhythm, normal rate, normal NC intervals, ST wave normal, normal axis,      Past Medical History:   Diagnosis Date    Arrhythmia     episode A.Fib with tachycardia 1/2014 (on a cruise) Converted back to normal sinus rhythm with Amiodarone    Arthritis     in hands    Cellulitis     history of cellulitis right foot, leg (hosp. 8/19/11) and 2nd episode in 1/2014 (not hospitalized);turned into ulcer on

## 2024-07-29 NOTE — CONSULTS
Infectious Disease Consult      Today's Date: 7/29/2024   Admit Date: 7/27/2024  YOB: 1950    Impression:   Strep Agalactiae (group B) bacteremia 7/27/24.  Repeat Bcx 7/29 pending.  TTE 7/29/24 with mobile echodensity on the MV.  Source possibly RLE cellulitis vs left great toe wound.        RLE cellulitis   Left great toe wound that has been present since Feb 2024. Following with Podiatry outpatient.  Patient reports wound is improving now.  Reports significant swelling of left great toe about 2 weeks ago that has improved.    Left foot x-ray 7/28/24 with irregularity involving the first DJP joint likely degenerative but osteomyelitis no entirely excluded      Afib on Eliquis & Multaq  DM:  Hgb A1C 6.5 on 7/28/24  H/x of right foot ulcer/OM R great toe amputation.      Plan:   Stop Cefepime and Vancomycin  Start Ancef 2 grams Q8  Follow repeat blood cultures   Recommend Cardiology consult for BRAXTON   Follow results of MRI left foot   ID following     Anti-infectives:   Cefepime 7/28/24-  Vancomycin 7/29/24-    Subjective:   Date of Consultation:  July 29, 2024  Date of Admission: 7/27/2024   Referring Provider: Yany Peterson   Reason for consult: \"Admitted with sepsis w/o source, BCX grew Group B Strep\"     Patient is a 73 y.o. female that presented to the ED on 7/27/24 with c/o generalized fatigue/weakness, fever 103, and difficulty concentrating. Patient also complained of one episode of urinary incontinence which is unseal for here but no other urinary symptoms.  Labs obtained revealed a WBC 23.0 and platelets 80.  UA was unremarkable.  Blood culture was positive for Strep Agalactiae (group B).  Repeat blood cultures 7/29/24 are pending.  Covid screen negative.  Chest x-ray was normal.  Left foot x-ray secondary to left great toe wound noted irregularity involving the first DJP joint likely degenerative but osteomyelitis not entirely excluded.  MRI left foot is pending.  Patient has received Vancomycin

## 2024-07-29 NOTE — CARE COORDINATION
07/29/24 5317   Service Assessment   Patient Orientation Alert and Oriented   Cognition Alert   History Provided By Patient   Primary Caregiver Self   Support Systems Spouse/Significant Other;Children   Patient's Healthcare Decision Maker is: Legal Next of Kin   PCP Verified by CM Yes   Prior Functional Level Independent in ADLs/IADLs   Current Functional Level Independent in ADLs/IADLs   Can patient return to prior living arrangement Yes   Ability to make needs known: Good   Family able to assist with home care needs: Yes   Would you like for me to discuss the discharge plan with any other family members/significant others, and if so, who? Yes  (spouse)   Financial Resources Medicare   Community Resources None   Social/Functional History   Lives With Spouse   Type of Home House   Condition of Participation: Discharge Planning   The Plan for Transition of Care is related to the following treatment goals: return home with spouse   The Patient and/or Patient Representative was provided with a Choice of Provider? Patient   The Patient and/Or Patient Representative agree with the Discharge Plan? Yes   Freedom of Choice list was provided with basic dialogue that supports the patient's individualized plan of care/goals, treatment preferences, and shares the quality data associated with the providers?  Yes     Assessment completed with patient at bedside. Patient lives with her spouse. She is independent at baseline. No DME. Demographics and PCP confirmed. Discharge plan is to return home, no anticipated discharge needs.    Dannielle TOPETE, ACM  [place]

## 2024-07-30 ENCOUNTER — APPOINTMENT (OUTPATIENT)
Dept: CARDIAC CATH/INVASIVE PROCEDURES | Age: 74
DRG: 872 | End: 2024-07-30
Attending: INTERNAL MEDICINE
Payer: MEDICARE

## 2024-07-30 PROBLEM — L03.116 BILATERAL CELLULITIS OF LOWER LEG: Status: ACTIVE | Noted: 2024-07-30

## 2024-07-30 PROBLEM — L03.115 BILATERAL CELLULITIS OF LOWER LEG: Status: ACTIVE | Noted: 2024-07-30

## 2024-07-30 LAB
ALBUMIN SERPL-MCNC: 2.6 G/DL (ref 3.2–4.6)
ALBUMIN/GLOB SERPL: 0.8 (ref 1–1.9)
ALP SERPL-CCNC: 54 U/L (ref 35–104)
ALT SERPL-CCNC: 16 U/L (ref 12–65)
ANION GAP SERPL CALC-SCNC: 10 MMOL/L (ref 9–18)
AST SERPL-CCNC: 16 U/L (ref 15–37)
BACTERIA SPEC CULT: ABNORMAL
BACTERIA SPEC CULT: ABNORMAL
BASOPHILS # BLD: 0 K/UL (ref 0–0.2)
BASOPHILS NFR BLD: 0 % (ref 0–2)
BILIRUB SERPL-MCNC: 0.6 MG/DL (ref 0–1.2)
BUN SERPL-MCNC: 15 MG/DL (ref 8–23)
CALCIUM SERPL-MCNC: 8.2 MG/DL (ref 8.8–10.2)
CHLORIDE SERPL-SCNC: 106 MMOL/L (ref 98–107)
CO2 SERPL-SCNC: 23 MMOL/L (ref 20–28)
CREAT SERPL-MCNC: 0.61 MG/DL (ref 0.6–1.1)
DIFFERENTIAL METHOD BLD: ABNORMAL
ECHO BSA: 1.88 M2
EKG DIAGNOSIS: NORMAL
EKG Q-T INTERVAL: 330 MS
EKG QRS DURATION: 96 MS
EKG QTC CALCULATION (BAZETT): 472 MS
EKG R AXIS: -26 DEGREES
EKG T AXIS: 68 DEGREES
EKG VENTRICULAR RATE: 123 BPM
EOSINOPHIL # BLD: 0.1 K/UL (ref 0–0.8)
EOSINOPHIL NFR BLD: 1 % (ref 0.5–7.8)
ERYTHROCYTE [DISTWIDTH] IN BLOOD BY AUTOMATED COUNT: 15.2 % (ref 11.9–14.6)
GLOBULIN SER CALC-MCNC: 3.1 G/DL (ref 2.3–3.5)
GLUCOSE BLD STRIP.AUTO-MCNC: 111 MG/DL (ref 65–100)
GLUCOSE BLD STRIP.AUTO-MCNC: 117 MG/DL (ref 65–100)
GLUCOSE BLD STRIP.AUTO-MCNC: 139 MG/DL (ref 65–100)
GLUCOSE BLD STRIP.AUTO-MCNC: 146 MG/DL (ref 65–100)
GLUCOSE SERPL-MCNC: 127 MG/DL (ref 70–99)
GRAM STN SPEC: ABNORMAL
HCT VFR BLD AUTO: 40.6 % (ref 35.8–46.3)
HGB BLD-MCNC: 13.4 G/DL (ref 11.7–15.4)
IMM GRANULOCYTES # BLD AUTO: 0.1 K/UL (ref 0–0.5)
IMM GRANULOCYTES NFR BLD AUTO: 1 % (ref 0–5)
LYMPHOCYTES # BLD: 1.7 K/UL (ref 0.5–4.6)
LYMPHOCYTES NFR BLD: 18 % (ref 13–44)
MAGNESIUM SERPL-MCNC: 1.9 MG/DL (ref 1.8–2.4)
MCH RBC QN AUTO: 31.2 PG (ref 26.1–32.9)
MCHC RBC AUTO-ENTMCNC: 33 G/DL (ref 31.4–35)
MCV RBC AUTO: 94.6 FL (ref 82–102)
MONOCYTES # BLD: 0.8 K/UL (ref 0.1–1.3)
MONOCYTES NFR BLD: 8 % (ref 4–12)
NEUTS SEG # BLD: 6.8 K/UL (ref 1.7–8.2)
NEUTS SEG NFR BLD: 72 % (ref 43–78)
NRBC # BLD: 0 K/UL (ref 0–0.2)
PLATELET # BLD AUTO: 57 K/UL (ref 150–450)
PMV BLD AUTO: 10.4 FL (ref 9.4–12.3)
POTASSIUM SERPL-SCNC: 3.5 MMOL/L (ref 3.5–5.1)
PROCALCITONIN SERPL-MCNC: 1.68 NG/ML (ref 0–0.1)
PROT SERPL-MCNC: 5.8 G/DL (ref 6.3–8.2)
RBC # BLD AUTO: 4.29 M/UL (ref 4.05–5.2)
SERVICE CMNT-IMP: ABNORMAL
SODIUM SERPL-SCNC: 139 MMOL/L (ref 136–145)
WBC # BLD AUTO: 9.4 K/UL (ref 4.3–11.1)

## 2024-07-30 PROCEDURE — 6360000002 HC RX W HCPCS: Performed by: INTERNAL MEDICINE

## 2024-07-30 PROCEDURE — 82962 GLUCOSE BLOOD TEST: CPT

## 2024-07-30 PROCEDURE — 93319 3D ECHO IMG CGEN CAR ANOMAL: CPT

## 2024-07-30 PROCEDURE — 1100000003 HC PRIVATE W/ TELEMETRY

## 2024-07-30 PROCEDURE — 99152 MOD SED SAME PHYS/QHP 5/>YRS: CPT

## 2024-07-30 PROCEDURE — 6370000000 HC RX 637 (ALT 250 FOR IP): Performed by: INTERNAL MEDICINE

## 2024-07-30 PROCEDURE — 36415 COLL VENOUS BLD VENIPUNCTURE: CPT

## 2024-07-30 PROCEDURE — 93312 ECHO TRANSESOPHAGEAL: CPT

## 2024-07-30 PROCEDURE — 94660 CPAP INITIATION&MGMT: CPT

## 2024-07-30 PROCEDURE — 6370000000 HC RX 637 (ALT 250 FOR IP): Performed by: HOSPITALIST

## 2024-07-30 PROCEDURE — 6370000000 HC RX 637 (ALT 250 FOR IP): Performed by: PHYSICIAN ASSISTANT

## 2024-07-30 PROCEDURE — 2580000003 HC RX 258: Performed by: HOSPITALIST

## 2024-07-30 PROCEDURE — 85025 COMPLETE CBC W/AUTO DIFF WBC: CPT

## 2024-07-30 PROCEDURE — 93010 ELECTROCARDIOGRAM REPORT: CPT | Performed by: INTERNAL MEDICINE

## 2024-07-30 PROCEDURE — 2580000003 HC RX 258: Performed by: INTERNAL MEDICINE

## 2024-07-30 PROCEDURE — 6370000000 HC RX 637 (ALT 250 FOR IP): Performed by: FAMILY MEDICINE

## 2024-07-30 PROCEDURE — 80053 COMPREHEN METABOLIC PANEL: CPT

## 2024-07-30 PROCEDURE — 83735 ASSAY OF MAGNESIUM: CPT

## 2024-07-30 PROCEDURE — 84145 PROCALCITONIN (PCT): CPT

## 2024-07-30 RX ORDER — ATORVASTATIN CALCIUM 10 MG/1
10 TABLET, FILM COATED ORAL NIGHTLY
Status: DISCONTINUED | OUTPATIENT
Start: 2024-07-30 | End: 2024-07-31 | Stop reason: HOSPADM

## 2024-07-30 RX ORDER — FENTANYL CITRATE 50 UG/ML
INJECTION, SOLUTION INTRAMUSCULAR; INTRAVENOUS PRN
Status: COMPLETED | OUTPATIENT
Start: 2024-07-30 | End: 2024-07-30

## 2024-07-30 RX ORDER — MIDAZOLAM HYDROCHLORIDE 1 MG/ML
INJECTION INTRAMUSCULAR; INTRAVENOUS PRN
Status: COMPLETED | OUTPATIENT
Start: 2024-07-30 | End: 2024-07-30

## 2024-07-30 RX ORDER — LIDOCAINE HYDROCHLORIDE 20 MG/ML
SOLUTION OROPHARYNGEAL PRN
Status: COMPLETED | OUTPATIENT
Start: 2024-07-30 | End: 2024-07-30

## 2024-07-30 RX ADMIN — ISOSORBIDE MONONITRATE 30 MG: 30 TABLET, EXTENDED RELEASE ORAL at 09:04

## 2024-07-30 RX ADMIN — APIXABAN 5 MG: 5 TABLET, FILM COATED ORAL at 21:44

## 2024-07-30 RX ADMIN — CEFAZOLIN SODIUM 2000 MG: 100 INJECTION, POWDER, LYOPHILIZED, FOR SOLUTION INTRAVENOUS at 09:02

## 2024-07-30 RX ADMIN — MIDAZOLAM 2 MG: 1 INJECTION INTRAMUSCULAR; INTRAVENOUS at 10:09

## 2024-07-30 RX ADMIN — MAGNESIUM GLUCONATE 500 MG ORAL TABLET 400 MG: 500 TABLET ORAL at 14:00

## 2024-07-30 RX ADMIN — LIDOCAINE HYDROCHLORIDE 15 ML: 20 SOLUTION ORAL at 09:36

## 2024-07-30 RX ADMIN — CEFAZOLIN SODIUM 2000 MG: 100 INJECTION, POWDER, LYOPHILIZED, FOR SOLUTION INTRAVENOUS at 17:30

## 2024-07-30 RX ADMIN — CEFAZOLIN SODIUM 2000 MG: 100 INJECTION, POWDER, LYOPHILIZED, FOR SOLUTION INTRAVENOUS at 02:13

## 2024-07-30 RX ADMIN — FENTANYL CITRATE 50 MCG: 50 INJECTION, SOLUTION INTRAMUSCULAR; INTRAVENOUS at 10:09

## 2024-07-30 RX ADMIN — ATORVASTATIN CALCIUM 10 MG: 10 TABLET, FILM COATED ORAL at 21:43

## 2024-07-30 RX ADMIN — SODIUM CHLORIDE, PRESERVATIVE FREE 10 ML: 5 INJECTION INTRAVENOUS at 21:45

## 2024-07-30 RX ADMIN — CITALOPRAM HYDROBROMIDE 20 MG: 10 TABLET ORAL at 21:45

## 2024-07-30 RX ADMIN — APIXABAN 5 MG: 5 TABLET, FILM COATED ORAL at 09:04

## 2024-07-30 RX ADMIN — MIDAZOLAM 1 MG: 1 INJECTION INTRAMUSCULAR; INTRAVENOUS at 10:13

## 2024-07-30 RX ADMIN — DRONEDARONE 400 MG: 400 TABLET, FILM COATED ORAL at 09:04

## 2024-07-30 RX ADMIN — DRONEDARONE 400 MG: 400 TABLET, FILM COATED ORAL at 17:28

## 2024-07-30 RX ADMIN — SODIUM CHLORIDE, PRESERVATIVE FREE 10 ML: 5 INJECTION INTRAVENOUS at 09:02

## 2024-07-30 RX ADMIN — Medication 1 CAPSULE: at 09:04

## 2024-07-30 NOTE — WOUND CARE
Consulted for L great toe. Pt seeing Podiatrist from [facility], MD Angel, last seen 6/21. Instructed to apply triple antibacterial ointment (Neosporin) and cover with dry dressing daily.    L great toe 1x0.5x0.2cm, pink/red, granular, small serosanguinous drainage, no odor. Recommend Silvadene gel, nonadherent gauze, wrap with rolled gauze, wrap with coban daily/PRN.      R 3rd toe 0.6x0.3x0.2cm, dry fibrin/eschar, no drainage/odor. Recommend Silvadene gel, nonadherent gauze, cover with adhesive bandage daily/PRN.

## 2024-07-31 ENCOUNTER — TELEPHONE (OUTPATIENT)
Dept: FAMILY MEDICINE CLINIC | Facility: CLINIC | Age: 74
End: 2024-07-31

## 2024-07-31 VITALS
TEMPERATURE: 98.2 F | WEIGHT: 185.41 LBS | RESPIRATION RATE: 16 BRPM | OXYGEN SATURATION: 95 % | SYSTOLIC BLOOD PRESSURE: 157 MMHG | DIASTOLIC BLOOD PRESSURE: 80 MMHG | HEART RATE: 56 BPM | BODY MASS INDEX: 34.12 KG/M2 | HEIGHT: 62 IN

## 2024-07-31 LAB
ALBUMIN SERPL-MCNC: 2.7 G/DL (ref 3.2–4.6)
ALBUMIN/GLOB SERPL: 0.9 (ref 1–1.9)
ALP SERPL-CCNC: 56 U/L (ref 35–104)
ALT SERPL-CCNC: 12 U/L (ref 12–65)
ANION GAP SERPL CALC-SCNC: 9 MMOL/L (ref 9–18)
AST SERPL-CCNC: 16 U/L (ref 15–37)
BASOPHILS # BLD: 0 K/UL (ref 0–0.2)
BASOPHILS NFR BLD: 1 % (ref 0–2)
BILIRUB SERPL-MCNC: 0.4 MG/DL (ref 0–1.2)
BUN SERPL-MCNC: 11 MG/DL (ref 8–23)
CALCIUM SERPL-MCNC: 8.2 MG/DL (ref 8.8–10.2)
CHLORIDE SERPL-SCNC: 105 MMOL/L (ref 98–107)
CO2 SERPL-SCNC: 25 MMOL/L (ref 20–28)
CREAT SERPL-MCNC: 0.54 MG/DL (ref 0.6–1.1)
CRP SERPL-MCNC: 173 MG/L (ref 0–10)
DIFFERENTIAL METHOD BLD: ABNORMAL
EOSINOPHIL # BLD: 0.1 K/UL (ref 0–0.8)
EOSINOPHIL NFR BLD: 2 % (ref 0.5–7.8)
ERYTHROCYTE [DISTWIDTH] IN BLOOD BY AUTOMATED COUNT: 14.9 % (ref 11.9–14.6)
GLOBULIN SER CALC-MCNC: 3.1 G/DL (ref 2.3–3.5)
GLUCOSE BLD STRIP.AUTO-MCNC: 127 MG/DL (ref 65–100)
GLUCOSE BLD STRIP.AUTO-MCNC: 138 MG/DL (ref 65–100)
GLUCOSE SERPL-MCNC: 137 MG/DL (ref 70–99)
HAV IGM SER QL: NONREACTIVE
HBV CORE IGM SER QL: NONREACTIVE
HBV SURFACE AG SER QL: NONREACTIVE
HCT VFR BLD AUTO: 39.8 % (ref 35.8–46.3)
HCV AB SER QL: NONREACTIVE
HGB BLD-MCNC: 12.9 G/DL (ref 11.7–15.4)
IMM GRANULOCYTES # BLD AUTO: 0.1 K/UL (ref 0–0.5)
IMM GRANULOCYTES NFR BLD AUTO: 1 % (ref 0–5)
LYMPHOCYTES # BLD: 1.8 K/UL (ref 0.5–4.6)
LYMPHOCYTES NFR BLD: 27 % (ref 13–44)
MAGNESIUM SERPL-MCNC: 1.9 MG/DL (ref 1.8–2.4)
MCH RBC QN AUTO: 30.7 PG (ref 26.1–32.9)
MCHC RBC AUTO-ENTMCNC: 32.4 G/DL (ref 31.4–35)
MCV RBC AUTO: 94.8 FL (ref 82–102)
MONOCYTES # BLD: 0.5 K/UL (ref 0.1–1.3)
MONOCYTES NFR BLD: 8 % (ref 4–12)
NEUTS SEG # BLD: 4 K/UL (ref 1.7–8.2)
NEUTS SEG NFR BLD: 61 % (ref 43–78)
NRBC # BLD: 0 K/UL (ref 0–0.2)
PLATELET # BLD AUTO: 51 K/UL (ref 150–450)
PMV BLD AUTO: 11.1 FL (ref 9.4–12.3)
POTASSIUM SERPL-SCNC: 3.6 MMOL/L (ref 3.5–5.1)
PROT SERPL-MCNC: 5.8 G/DL (ref 6.3–8.2)
RBC # BLD AUTO: 4.2 M/UL (ref 4.05–5.2)
SERVICE CMNT-IMP: ABNORMAL
SERVICE CMNT-IMP: ABNORMAL
SODIUM SERPL-SCNC: 139 MMOL/L (ref 136–145)
WBC # BLD AUTO: 6.4 K/UL (ref 4.3–11.1)

## 2024-07-31 PROCEDURE — 6370000000 HC RX 637 (ALT 250 FOR IP): Performed by: FAMILY MEDICINE

## 2024-07-31 PROCEDURE — 80074 ACUTE HEPATITIS PANEL: CPT

## 2024-07-31 PROCEDURE — 2580000003 HC RX 258: Performed by: INTERNAL MEDICINE

## 2024-07-31 PROCEDURE — 2580000003 HC RX 258: Performed by: HOSPITALIST

## 2024-07-31 PROCEDURE — 82962 GLUCOSE BLOOD TEST: CPT

## 2024-07-31 PROCEDURE — 6360000002 HC RX W HCPCS: Performed by: INTERNAL MEDICINE

## 2024-07-31 PROCEDURE — 6370000000 HC RX 637 (ALT 250 FOR IP): Performed by: PHYSICIAN ASSISTANT

## 2024-07-31 PROCEDURE — 85025 COMPLETE CBC W/AUTO DIFF WBC: CPT

## 2024-07-31 PROCEDURE — 6370000000 HC RX 637 (ALT 250 FOR IP): Performed by: HOSPITALIST

## 2024-07-31 PROCEDURE — 36415 COLL VENOUS BLD VENIPUNCTURE: CPT

## 2024-07-31 PROCEDURE — 80053 COMPREHEN METABOLIC PANEL: CPT

## 2024-07-31 PROCEDURE — 83735 ASSAY OF MAGNESIUM: CPT

## 2024-07-31 RX ORDER — CEFADROXIL 1000 MG/1
1 TABLET ORAL 2 TIMES DAILY
Qty: 20 TABLET | Refills: 0 | Status: SHIPPED | OUTPATIENT
Start: 2024-07-31 | End: 2024-08-10

## 2024-07-31 RX ADMIN — CEFAZOLIN SODIUM 2000 MG: 100 INJECTION, POWDER, LYOPHILIZED, FOR SOLUTION INTRAVENOUS at 03:16

## 2024-07-31 RX ADMIN — APIXABAN 5 MG: 5 TABLET, FILM COATED ORAL at 08:56

## 2024-07-31 RX ADMIN — CEFAZOLIN SODIUM 2000 MG: 100 INJECTION, POWDER, LYOPHILIZED, FOR SOLUTION INTRAVENOUS at 08:57

## 2024-07-31 RX ADMIN — MAGNESIUM GLUCONATE 500 MG ORAL TABLET 400 MG: 500 TABLET ORAL at 08:56

## 2024-07-31 RX ADMIN — SODIUM CHLORIDE, PRESERVATIVE FREE 10 ML: 5 INJECTION INTRAVENOUS at 08:57

## 2024-07-31 RX ADMIN — LISINOPRIL 20 MG: 20 TABLET ORAL at 08:56

## 2024-07-31 RX ADMIN — DRONEDARONE 400 MG: 400 TABLET, FILM COATED ORAL at 08:56

## 2024-07-31 RX ADMIN — Medication 1 CAPSULE: at 08:56

## 2024-07-31 RX ADMIN — ISOSORBIDE MONONITRATE 30 MG: 30 TABLET, EXTENDED RELEASE ORAL at 08:56

## 2024-07-31 NOTE — CARE COORDINATION
Chart reviewed and patient discussed in IDT rounds this AM. Patient discharging home today with her spouse. Spouse providing transport home. IMM signed 7/29/24. No further discharge needs.    Dannielle TOPETE, ACM  [place]

## 2024-07-31 NOTE — DISCHARGE SUMMARY
Immature Granulocytes Absolute 0.1 0.0 - 0.5 K/UL   Comprehensive Metabolic Panel w/ Reflex to MG    Collection Time: 07/31/24  4:59 AM   Result Value Ref Range    Sodium 139 136 - 145 mmol/L    Potassium 3.6 3.5 - 5.1 mmol/L    Chloride 105 98 - 107 mmol/L    CO2 25 20 - 28 mmol/L    Anion Gap 9 9 - 18 mmol/L    Glucose 137 (H) 70 - 99 mg/dL    BUN 11 8 - 23 MG/DL    Creatinine 0.54 (L) 0.60 - 1.10 MG/DL    Est, Glom Filt Rate >90 >60 ml/min/1.73m2    Calcium 8.2 (L) 8.8 - 10.2 MG/DL    Total Bilirubin 0.4 0.0 - 1.2 MG/DL    ALT 12 12 - 65 U/L    AST 16 15 - 37 U/L    Alk Phosphatase 56 35 - 104 U/L    Total Protein 5.8 (L) 6.3 - 8.2 g/dL    Albumin 2.7 (L) 3.2 - 4.6 g/dL    Globulin 3.1 2.3 - 3.5 g/dL    Albumin/Globulin Ratio 0.9 (L) 1.0 - 1.9     Magnesium    Collection Time: 07/31/24  4:59 AM   Result Value Ref Range    Magnesium 1.9 1.8 - 2.4 mg/dL   POCT Glucose    Collection Time: 07/31/24  7:12 AM   Result Value Ref Range    POC Glucose 138 (H) 65 - 100 mg/dL    Performed by: Korin    Hepatitis Panel, Acute    Collection Time: 07/31/24  9:07 AM   Result Value Ref Range    Hep A IgM NONREACTIVE NR      Hep B Core Ab, IgM NONREACTIVE NR      Hepatitis B Surface Ag NONREACTIVE NR      Hepatitis C Ab NONREACTIVE NR     Path Review, Smear    Collection Time: 07/31/24  9:07 AM   Result Value Ref Range    Pathologist Review PENDING    POCT Glucose    Collection Time: 07/31/24 10:41 AM   Result Value Ref Range    POC Glucose 127 (H) 65 - 100 mg/dL    Performed by: Korin        No results for input(s): \"COVID19\" in the last 72 hours.    Recent Vital Data:  Patient Vitals for the past 24 hrs:   Temp Pulse Resp BP SpO2   07/31/24 0856 -- -- -- (!) 157/80 --   07/31/24 0731 98.2 °F (36.8 °C) 56 16 (!) 157/80 95 %   07/31/24 0338 98.4 °F (36.9 °C) 53 18 (!) 175/81 97 %   07/30/24 2002 98.1 °F (36.7 °C) 57 18 (!) 147/66 96 %   07/30/24 1546 97.7 °F (36.5 °C) 58 20 136/66 94 %       Oxygen

## 2024-07-31 NOTE — PROGRESS NOTES
[facility] CARDIOLOGY PROGRESS NOTE           7/30/2024 12:48 PM    Admit Date: 7/27/2024      Subjective:     No overnight events.  BRAXTON earlier today with no evidence of valvular vegetation.  Converted back into sinus rhythm overnight.  Feels improved overall.    ROS:  Cardiovascular:  As noted above    Objective:      Vitals:    07/30/24 1020 07/30/24 1020 07/30/24 1030 07/30/24 1128   BP: (!) 122/59  (!) 118/57 (!) 128/56   Pulse:  56 56 55   Resp:  15 14 18   Temp:    97.9 °F (36.6 °C)   TempSrc:    Oral   SpO2:  96% 96% 94%   Weight:       Height:           Physical Exam:  General-No Acute Distress  Neck- supple, no JVD  CV- regular rate and rhythm no MRG  Lung- clear bilaterally  Abd- soft, nontender, nondistended  Ext- no edema bilaterally.  Skin- warm and dry    Data Review:   Recent Labs     07/29/24  0430 07/30/24  0509    139   K 3.5 3.5   MG 1.9 1.9   BUN 15 15   WBC 12.2* 9.4   HGB 13.5 13.4   HCT 41.1 40.6   PLT 59* 57*       Assessment/Plan:     Principal Problem:    Atrial fibrillation with RVR  -Likely triggered by underlying acute issues; converted back into sinus rhythm overnight. Continue current Multaq/Lopressor/Eliquis.     Streptococcal bacteremia  -BRAXTON with noted MAC but no evidence of valvular vegetation.  -Antibiotics per ID.     Coronary disease  -Usually maintained on atorvastatin in the outpatient setting-plan resuming.  Stable symptoms.  Preserved EF.  Resume home atorvastatin.      Bilateral cellulitis of lower leg  -Per primary team     Not much further to add from a cardiac standpoint.  Will sign off.  Please call if questions.    Gregor Hernandez MD  7/30/2024 12:48 PM   
       Hospitalist Progress Note   Admit Date:  2024 11:38 PM   Name:  Jacqueilne Tay   Age:  73 y.o.  Sex:  female  :  1950   MRN:  [id number]   Room:  [facility]/    Presenting/Chief Complaint: Fever     Reason(s) for Admission: Septicemia (HCC) [A41.9]  Systemic inflammatory response syndrome (HCC) [R65.10]     Hospital Course:   Jacqueline Tay is a 73 y.o. female with medical history of PAF on Eliquis, DM2, CAD, ALCIDES who presented with fever of 103 associated with generalized fatigue and urinary incontinence.    In ED, Tmax 100.7.  WBC 23 K.  Procalcitonin 3.25.  UA not consistent with infection.  CXR unremarkable.  Rapid COVID-negative.    Pt was admitted with sepsis, unclear source of fever and leukocytosis.  Patient was started on broad-spectrum antibiotics.  Blood cultures from admission grew Streptococcus. Repeat blood cultures done and pending. XR L foot shows irregularity involving the first DIP joint likely degenerative but cannot exclude osteomyelitis.  MRI left foot obtained.  TTE ordered.  ID consulted.      Subjective & 24hr Events:     Patient alert and oriented x 3.  Afebrile.  Leukocytosis much improved to 12 K today from 20 K yesterday. Afib rate in 130's-150's today despite being on Multaq. Stated this also happens at home occasionally.  Denies cough, SOB, chest pain, abdominal pain, diarrhea.      Assessment & Plan:     Sepsis w/o source  Streptococcal bacteremia  WBC 23K on admission, Tmax 100.7, procalcitonin 3.25. No clear source of infection, ? Diabetic toe ulcer/cellulitis. UA not concerning for infection.  Rapid COVID-negative.  CXR unremarkable.  Blood cultures : Streptococcus  ABX: Vanco/Cefepime (-...)  Cont probiotics  MRSA Cx--pending  Repeat BCX   MRI L foot for OM workup--pending  Obtain TTE--pending  Consult kellen MARTINEZ recs     Diabetic foot ulcer  S/p R great toe amputation in . Now L great toe with some erythema and ulcer. She has been following a 
       Hospitalist Progress Note   Admit Date:  2024 11:38 PM   Name:  Jacqueline Tay   Age:  73 y.o.  Sex:  female  :  1950   MRN:  [id number]   Room:  [facility]    Presenting/Chief Complaint: Fever     Reason(s) for Admission: Septicemia (HCC) [A41.9]  Systemic inflammatory response syndrome (HCC) [R65.10]     Hospital Course:   Jacqueline Tay is a 73 y.o. female with medical history of PAF on Eliquis, DM2, CAD, ALCIDES who presented with fever of 103 associated with generalized fatigue and urinary incontinence.    In ED, Tmax 100.7.  WBC 23 K.  Procalcitonin 3.25.  UA not consistent with infection.  CXR unremarkable.  Rapid COVID-negative.    Pt was admitted with sepsis, unclear source of fever and leukocytosis.  Patient was started on broad-spectrum antibiotics.  Blood cultures from admission grew Streptococcus. XR L foot shows irregularity involving the first DIP joint likely degenerative but cannot exclude osteomyelitis.       Subjective & 24hr Events:     Patient alert and oriented x 3.  Daughter and  at bedside.  Stated that she recently developed a blister on her left great toe after walking around on vacation. Stated she has been following a podiatrist outpatient for this.  Stated that she feels much better today.  Denies any more urinary incontinence and no more fever.  Denies urinary frequency, hematuria or dysuria.  Denies cough, SOB, chest pain, abdominal pain, diarrhea.      Assessment & Plan:     Sepsis w/o source  Streptococcal bacteremia  WBC 23K on admission, Tmax 100.7, procalcitonin 3.25. No clear source of infection, ? Diabetic toe ulcer/cellulitis. UA not concerning for infection.  Rapid COVID-negative.  CXR unremarkable.  Blood cultures : Streptococcus  ABX: Vanco/Cefepime (-...)  Add probiotics  Repeat BCX in AM   Check MRI L foot for OM workup  Obtain TTE  Consult ID in AM, appreciate recs    Diabetic foot ulcer  S/p R great toe amputation in . Now L great toe with 
4 Eyes Skin Assessment     NAME:  Jacqueline Tay  YOB: 1950  MEDICAL RECORD NUMBER:  [id number]    The patient is being assessed for  Admission    I agree that at least one RN has performed a thorough Head to Toe Skin Assessment on the patient. ALL assessment sites listed below have been assessed.      Areas assessed by both nurses:    Head, Face, Ears, Shoulders, Back, Chest, Arms, Elbows, Hands, Sacrum. Buttock, Coccyx, Ischium, Legs. Feet and Heels, and Under Medical Devices         Does the Patient have a Wound? Yes, present on admission, right toes        Harsh Prevention initiated by RN: No  Wound Care Orders initiated by RN: No    Pressure Injury (Stage 3,4, Unstageable, DTI, NWPT, and Complex wounds) if present, place Wound referral order by RN under : No    New Ostomies, if present place, Ostomy referral order under : No     Nurse 1 eSignature: Electronically signed by TURNER Mascorro, RN, CMSRN, on 7/28/24 at 5:04 AM EDT    **SHARE this note so that the co-signing nurse can place an eSignature**    Nurse 2 eSignature: Electronically signed by An Kiser RN on 7/28/24 at 5:09 AM EDT    
ACUTE PHYSICAL THERAPY GOALS:   (Developed with and agreed upon by patient and/or caregiver.)  LTG:   (1.)Ms. Tay will ambulate with MODIFIED INDEPENDENCE for 150 feet with the least restrictive device within 7 treatment day(s).      ________________________________________________________________________________________________        PHYSICAL THERAPY Initial Assessment, Discharge, and PM  (Link to Caseload Tracking:    Acknowledge Orders  Time In/Out  PT Charge Capture  Rehab Caseload Tracker    Jacqueline Tay is a 73 y.o. female   PRIMARY DIAGNOSIS: Sepsis (HCC)  Septicemia (HCC) [A41.9]  Systemic inflammatory response syndrome (HCC) [R65.10]       Reason for Referral: Generalized Muscle Weakness (M62.81)  Inpatient: Payor: MEDICARE / Plan: MEDICARE PART A AND B / Product Type: *No Product type* /     ASSESSMENT:     REHAB RECOMMENDATIONS:   Recommendation to date pending progress:  Setting:  No further skilled physical therapy after discharge from hospital    Equipment:    None     ASSESSMENT:  Ms. Tay was admitted to the hospital with c/o fever and episode of urinary incontinence.  She is currently undergoing sepsis workup.  Pt presents to PT with WFL AROM and WFL strength in B LEs.  Pt performed transfers today with supervision and good-fair standing balance.  She demonstrated decreased carmina and increased trunk sway walking today.  Pt also with PRN use of handrail for ambulating.  She performed standing activities as well with supervision-independence. Ms. Tay appears to be functioning close to her baseline with no needs at this time.     [facility] AM-PAC™ “6 Clicks” Basic Mobility Inpatient Short Form  AM-PAC Basic Mobility - Inpatient   How much help is needed turning from your back to your side while in a flat bed without using bedrails?: None  How much help is needed moving from lying on your back to sitting on the side of a flat bed without using bedrails?: None  How much help is 
BRAXTON complete with   Sedation start 1008  Sedation end 1019  3mg of Versed  50mcg of Fentanyl used for sedation  Numbed with lidocaine at 0936    
Hourly rounding completed during shift.  MRI screen was completed.  All needs met at this time. Bed L/L with call bell in reach.  Report given to oncoming RN.   
Infectious Disease Progress Note      Today's Date: 7/31/2024   Admit Date: 7/27/2024  YOB: 1950    Impression:   Strep Agalactiae (group B) bacteremia 7/27/24.  Repeat Bcx 7/29 with NGTD.  TTE 7/29/24 with mobile echodensity on the MV.  BRAXTON noted mitral annular calcification but no evidence of valvular vegetation.  Source possibly RLE cellulitis vs left great toe wound.        RLE cellulitis: Erythema improving   Left great toe wound that has been present since Feb 2024. Following with Podiatry outpatient.  Patient reports wound is improving now.  Reports significant swelling of left great toe about 2 weeks ago that has improved.    Left foot x-ray 7/28/24 with irregularity involving the first DJP joint likely degenerative but osteomyelitis no entirely excluded      MRI left foot 7/30/24 with degenerative changes and mild subcutaneous edema but no abscess or osteomyelitis.    Thrombocytopenia:  Periods of low platelets dating back to 2016 (90's to < 150) but even lower now.  ? Abx related.    Afib on Eliquis & Multaq  DM:  Hgb A1C 6.5 on 7/28/24  H/x of right foot ulcer/OM R great toe amputation.      Plan:   Stop Ancef.   Start Cefadroxil 1 gram BID.  Continue Cefadroxil to complete a total of 2 weeks abx therapy with EOT 8/10/24.  Please send patient with prescription upon discharge.      Follow results of final blood cultures.    Monitor platelets.  Unclear currently if lower platelets than patient's baseline ~ 90's to 130's range is related to abx therapy.  Patient does not recall anyone ever telling her about low platelets in the past.  If related to abx therapy, hopefully transitioning to PO regimen will have less impact on platelets.         Recommend patient have close follow-up with PCP to recheck platelets/monitor platelets closely once discharged.  Note that Hospitalist is also planning to send referral to Hematology for outpatient work-up regarding thrombocytopenia.           Patient to 
Patient received to [facility] room # 15  via transport from [facility].  Patient scheduled for BRAXTON today with Dr Hernandez. Procedure reviewed & questions answered, voiced good understanding consent obtained & placed on chart. All medications and medical history reviewed. Will prep patient per orders. Patient & family updated on plan of care.      The patient has a fraility score of 3-MANAGING WELL, based on patient A&Ox3, patient able to ambulate to room without difficulty.  
Please complete MRI screening form and have patient and RN sign.   
Pt is in bed without complaints. Hourly rounds completed and all needs met. BRAXTON complete this morning. Bed is low, locked, and call light is in reach. Pt encouraged to call for assistance.     
Pt is resting comfortably in bed without complaints.     Patient has been NPO, since Midnight, in preparation for BRAXTON procedure.    Of Note:  Patient converted back to NSR, overnight and has remained in sinus rhythm.  Telemetry order was renewed via order of MINGO Yu MD, for the next 48h.    Patient's bilateral great toes/feet were cleansed and redressed/wrapped.    Hourly rounds completed and all needs are met. Bed is locked, low and call light is within reach and patient is encouraged to call for any need(s).   
TRANSFER - OUT REPORT:    Verbal report given to Kurt(name) on Jacqueline Tay being transferred to [facility](unit) for routine progression of patient care       Report consisted of patient's Situation, Background, Assessment and   Recommendations(SBAR).     Information from the following report(s) Surgery Report was reviewed with the receiving nurse.    Opportunity for questions and clarification was provided.        
VANCO DAILY NOTE  [facility]   Pharmacy Pharmacokinetic Monitoring Service - Vancomycin    Consulting Provider: Larry   Indication: Unknown source of infection   Target Concentration: Goal AUC/MUSA 400-600 mg*hr/L  Day of Therapy: 1  Additional Antimicrobials: Cefepime    Pertinent Laboratory Values:   Wt Readings from Last 1 Encounters:   07/27/24 80.7 kg (178 lb)     Temp Readings from Last 1 Encounters:   07/28/24 98.1 °F (36.7 °C) (Oral)     Recent Labs     07/27/24  2344 07/28/24  0848   BUN 28*  --    CREATININE 0.95  --    WBC 23.0* 20.9*   PROCAL 3.25*  --    LACTSEPSIS 1.6  --      Estimated Creatinine Clearance: 51 mL/min (based on SCr of 0.95 mg/dL).      MRSA Nasal Swab: N/A. Non-respiratory infection    Assessment:  Date/Time Dose Concentration AUC         Note: Serum concentrations collected for AUC dosing may appear elevated if collected in close proximity to the dose administered, this is not necessarily an indication of toxicity    Plan:  Dosing recommendations based on Bayesian software  Continue vancomycin 1500 mg q 24hr  Anticipated AUC of 514 and trough concentration of 14.9 at steady state  Renal labs as indicated   Vancomycin concentrations will be ordered as clinically appropriate  Pharmacy will continue to monitor patient and adjust therapy as indicated    Thank you for the consult,  Indra Tolliver RP            
VANCO DAILY NOTE  [facility]   Pharmacy Pharmacokinetic Monitoring Service - Vancomycin    Consulting Provider: Larry   Indication: GBS Bacteremia  Target Concentration: Goal AUC/MUSA 400-600 mg*hr/L  Day of Therapy: 2  Additional Antimicrobials: Cefepime    Pertinent Laboratory Values:   Wt Readings from Last 1 Encounters:   07/27/24 80.7 kg (178 lb)     Temp Readings from Last 1 Encounters:   07/29/24 97.7 °F (36.5 °C) (Oral)     Recent Labs     07/27/24  2344 07/28/24  0848 07/29/24  0430   BUN 28*  --  15   CREATININE 0.95  --  0.58*   WBC 23.0* 20.9* 12.2*   PROCAL 3.25*  --  2.74*   LACTSEPSIS 1.6  --   --      Estimated Creatinine Clearance: 84 mL/min (A) (based on SCr of 0.58 mg/dL (L)).      MRSA Nasal Swab: N/A. Non-respiratory infection    Assessment:  Date/Time Dose Concentration AUC   7/29 0430 1500 mg q24h 15.2 343   Note: Serum concentrations collected for AUC dosing may appear elevated if collected in close proximity to the dose administered, this is not necessarily an indication of toxicity    Plan:  Current dosing regimen is subtherapeutic  Will increase to vancomycin 1000 mg IV q12h  Anticipated AUC of 457 and trough concentration of 15.9 at steady state  Renal labs as indicated   Vancomycin concentrations will be ordered as clinically appropriate  Pharmacy will continue to monitor patient and adjust therapy as indicated    Thank you for the consult,  Kamran Tavarez RPH  
VANCO DAILY NOTE  [facility]   Pharmacy Pharmacokinetic Monitoring Service - Vancomycin    Consulting Provider: Larry   Indication: Unknown source of infection   Target Concentration: Goal AUC/MUSA 400-600 mg*hr/L  Day of Therapy: 1  Additional Antimicrobials: Cefepime    Pertinent Laboratory Values:   Wt Readings from Last 1 Encounters:   07/27/24 80.7 kg (178 lb)     Temp Readings from Last 1 Encounters:   07/28/24 98.6 °F (37 °C) (Oral)     Recent Labs     07/27/24  2344   BUN 28*   CREATININE 0.95   WBC 23.0*   PROCAL 3.25*   LACTSEPSIS 1.6     Estimated Creatinine Clearance: 51 mL/min (based on SCr of 0.95 mg/dL).      MRSA Nasal Swab: N/A. Non-respiratory infection    Assessment:  Date/Time Dose Concentration AUC         Note: Serum concentrations collected for AUC dosing may appear elevated if collected in close proximity to the dose administered, this is not necessarily an indication of toxicity    Plan:  Dosing recommendations based on Bayesian software  Start vancomycin 2000 mg x1, then 1500 mg q 24hr  Anticipated AUC of 514 and trough concentration of 14.9 at steady state  Renal labs as indicated   Vancomycin concentrations will be ordered as clinically appropriate  Pharmacy will continue to monitor patient and adjust therapy as indicated    Thank you for the consult,  Shelbi Temple, PharmD, BCPS        
Very pleasant patient is resting comfortably in bed without complaints. Hourly rounds completed and all needs are met. Bed is locked, low and call light is within reach and patient is encouraged to call for any need(s).   
Very pleasant patient. Former [employer] employee.     Dressings on bi-lateral great-toe have been changed.  Patient took shower during shift.      Of Note: Presence of reddened, hot RLE. Marked at beginning of shift, so as to determine any additional surface area involvement. Patient indicates \"I am familiar  with cellulitis--this doesn't feel like that.\" Does not c/o pain.    Telemetry is continuing to monitor patient. NSR.    Antibiotics infused throughout shift per MAR, along with other medications as ordered.    Pt is resting comfortably in bed without complaints. Hourly rounds completed and all needs are met. Bed is locked, low and call light is within reach and patient is encouraged to call for any need(s).   
PG    MCHC 32.8 31.4 - 35.0 g/dL    RDW 15.2 (H) 11.9 - 14.6 %    Platelets 59 (L) 150 - 450 K/uL    MPV 10.9 9.4 - 12.3 FL    nRBC 0.00 0.0 - 0.2 K/uL    Differential Type AUTOMATED      Neutrophils % 83 (H) 43 - 78 %    Lymphocytes % 10 (L) 13 - 44 %    Monocytes % 6 4.0 - 12.0 %    Eosinophils % 0 (L) 0.5 - 7.8 %    Basophils % 0 0.0 - 2.0 %    Immature Granulocytes % 0 0.0 - 5.0 %    Neutrophils Absolute 10.1 (H) 1.7 - 8.2 K/UL    Lymphocytes Absolute 1.2 0.5 - 4.6 K/UL    Monocytes Absolute 0.8 0.1 - 1.3 K/UL    Eosinophils Absolute 0.0 0.0 - 0.8 K/UL    Basophils Absolute 0.0 0.0 - 0.2 K/UL    Immature Granulocytes Absolute 0.1 0.0 - 0.5 K/UL   Comprehensive Metabolic Panel w/ Reflex to MG    Collection Time: 07/29/24  4:30 AM   Result Value Ref Range    Sodium 138 136 - 145 mmol/L    Potassium 3.5 3.5 - 5.1 mmol/L    Chloride 105 98 - 107 mmol/L    CO2 21 20 - 28 mmol/L    Anion Gap 11 9 - 18 mmol/L    Glucose 123 (H) 70 - 99 mg/dL    BUN 15 8 - 23 MG/DL    Creatinine 0.58 (L) 0.60 - 1.10 MG/DL    Est, Glom Filt Rate >90 >60 ml/min/1.73m2    Calcium 8.0 (L) 8.8 - 10.2 MG/DL    Total Bilirubin 0.7 0.0 - 1.2 MG/DL    ALT 17 12 - 65 U/L    AST 22 15 - 37 U/L    Alk Phosphatase 48 35 - 104 U/L    Total Protein 5.8 (L) 6.3 - 8.2 g/dL    Albumin 2.8 (L) 3.2 - 4.6 g/dL    Globulin 3.0 2.3 - 3.5 g/dL    Albumin/Globulin Ratio 0.9 (L) 1.0 - 1.9     Magnesium    Collection Time: 07/29/24  4:30 AM   Result Value Ref Range    Magnesium 1.9 1.8 - 2.4 mg/dL   Procalcitonin    Collection Time: 07/29/24  4:30 AM   Result Value Ref Range    Procalcitonin 2.74 (H) 0.00 - 0.10 ng/mL   Vancomycin Level, Random    Collection Time: 07/29/24  4:30 AM   Result Value Ref Range    Vancomycin Rm 15.2 UG/ML   Culture, Blood 1    Collection Time: 07/29/24  4:31 AM    Specimen: Blood   Result Value Ref Range    Special Requests RIGHT  HAND        Culture NO GROWTH 1 DAY     Culture, Blood 1    Collection Time: 07/29/24  7:29 AM    
degrees    T Axis 68 degrees    Diagnosis       Atrial fibrillation with rapid ventricular response  Abnormal ECG  When compared with ECG of 18-MAR-2019 14:46,  Atrial fibrillation has replaced Sinus rhythm  Vent. rate has increased BY  68 BPM  ST no longer depressed in Anterior leads  Confirmed by MD FLETCHER DANIEL ([id number]) on 7/30/2024 7:13:13 AM     POCT Glucose    Collection Time: 07/29/24  8:33 PM   Result Value Ref Range    POC Glucose 150 (H) 65 - 100 mg/dL    Performed by: Maryam    CBC with Auto Differential    Collection Time: 07/30/24  5:09 AM   Result Value Ref Range    WBC 9.4 4.3 - 11.1 K/uL    RBC 4.29 4.05 - 5.2 M/uL    Hemoglobin 13.4 11.7 - 15.4 g/dL    Hematocrit 40.6 35.8 - 46.3 %    MCV 94.6 82 - 102 FL    MCH 31.2 26.1 - 32.9 PG    MCHC 33.0 31.4 - 35.0 g/dL    RDW 15.2 (H) 11.9 - 14.6 %    Platelets 57 (L) 150 - 450 K/uL    MPV 10.4 9.4 - 12.3 FL    nRBC 0.00 0.0 - 0.2 K/uL    Differential Type AUTOMATED      Neutrophils % 72 43 - 78 %    Lymphocytes % 18 13 - 44 %    Monocytes % 8 4.0 - 12.0 %    Eosinophils % 1 0.5 - 7.8 %    Basophils % 0 0.0 - 2.0 %    Immature Granulocytes % 1 0.0 - 5.0 %    Neutrophils Absolute 6.8 1.7 - 8.2 K/UL    Lymphocytes Absolute 1.7 0.5 - 4.6 K/UL    Monocytes Absolute 0.8 0.1 - 1.3 K/UL    Eosinophils Absolute 0.1 0.0 - 0.8 K/UL    Basophils Absolute 0.0 0.0 - 0.2 K/UL    Immature Granulocytes Absolute 0.1 0.0 - 0.5 K/UL   Comprehensive Metabolic Panel w/ Reflex to MG    Collection Time: 07/30/24  5:09 AM   Result Value Ref Range    Sodium 139 136 - 145 mmol/L    Potassium 3.5 3.5 - 5.1 mmol/L    Chloride 106 98 - 107 mmol/L    CO2 23 20 - 28 mmol/L    Anion Gap 10 9 - 18 mmol/L    Glucose 127 (H) 70 - 99 mg/dL    BUN 15 8 - 23 MG/DL    Creatinine 0.61 0.60 - 1.10 MG/DL    Est, Glom Filt Rate >90 >60 ml/min/1.73m2    Calcium 8.2 (L) 8.8 - 10.2 MG/DL    Total Bilirubin 0.6 0.0 - 1.2 MG/DL    ALT 16 12 - 65 U/L    AST 16 15 - 37 U/L    Alk Phosphatase 54 35

## 2024-08-01 ENCOUNTER — CARE COORDINATION (OUTPATIENT)
Dept: CARE COORDINATION | Facility: CLINIC | Age: 74
End: 2024-08-01

## 2024-08-01 LAB — PATH REV BLD -IMP: NORMAL

## 2024-08-01 NOTE — TELEPHONE ENCOUNTER
Care Transitions Initial Follow Up Call    Outreach made within 2 business days of discharge: Yes    Patient: Jacqueline Tay Patient : 1950   MRN: [id number]  Reason for Admission: Sepsis   Discharge Date: 24       Spoke with: Jacqueline Tay     Discharge department/facility: [facility] Interactive Patient Contact:  Was patient able to fill all prescriptions: Yes  Was patient instructed to bring all medications to the follow-up visit: Yes  Is patient taking all medications as directed in the discharge summary? Yes  Does patient understand their discharge instructions: Yes  Does patient have questions or concerns that need addressed prior to 7-14 day follow up office visit: no    Additional needs identified to be addressed with provider  No needs identified             Scheduled appointment with PCP within 7-14 days    Follow Up  Future Appointments   Date Time Provider Department [place]   2024  1:30 PM Salud Wallace [facility]   2024  1:15 PM [facility]

## 2024-08-01 NOTE — TELEPHONE ENCOUNTER
Pt discharged from [facility] on 07/31/24, please contact the patient within 24/48 hours to complete TCM and schedule hospital follow up within 14 days

## 2024-08-02 LAB
BACTERIA SPEC CULT: NORMAL
SERVICE CMNT-IMP: NORMAL

## 2024-08-02 NOTE — TELEPHONE ENCOUNTER
Please try to get the patient scheduled within 14 days of discharge or credit is not received for TCM

## 2024-08-07 ENCOUNTER — OFFICE VISIT (OUTPATIENT)
Dept: INTERNAL MEDICINE CLINIC | Facility: CLINIC | Age: 74
End: 2024-08-07

## 2024-08-07 VITALS
SYSTOLIC BLOOD PRESSURE: 113 MMHG | HEART RATE: 58 BPM | WEIGHT: 178 LBS | HEIGHT: 62 IN | BODY MASS INDEX: 32.76 KG/M2 | TEMPERATURE: 97.7 F | RESPIRATION RATE: 16 BRPM | DIASTOLIC BLOOD PRESSURE: 54 MMHG | OXYGEN SATURATION: 93 %

## 2024-08-07 DIAGNOSIS — I10 PRIMARY HYPERTENSION: ICD-10-CM

## 2024-08-07 DIAGNOSIS — E11.40 TYPE 2 DIABETES MELLITUS WITH DIABETIC NEUROPATHY, WITHOUT LONG-TERM CURRENT USE OF INSULIN (HCC): ICD-10-CM

## 2024-08-07 DIAGNOSIS — L97.516 DIABETIC ULCER OF TOE OF RIGHT FOOT ASSOCIATED WITH TYPE 2 DIABETES MELLITUS, WITH BONE INVOLVEMENT WITHOUT EVIDENCE OF NECROSIS (HCC): ICD-10-CM

## 2024-08-07 DIAGNOSIS — F33.1 MAJOR DEPRESSIVE DISORDER, RECURRENT, MODERATE (HCC): ICD-10-CM

## 2024-08-07 DIAGNOSIS — F33.1 MODERATE EPISODE OF RECURRENT MAJOR DEPRESSIVE DISORDER (HCC): ICD-10-CM

## 2024-08-07 DIAGNOSIS — I48.91 ATRIAL FIBRILLATION WITH RVR (HCC): Primary | ICD-10-CM

## 2024-08-07 DIAGNOSIS — G47.33 OBSTRUCTIVE SLEEP APNEA: ICD-10-CM

## 2024-08-07 DIAGNOSIS — E11.621 DIABETIC ULCER OF TOE OF RIGHT FOOT ASSOCIATED WITH TYPE 2 DIABETES MELLITUS, WITH BONE INVOLVEMENT WITHOUT EVIDENCE OF NECROSIS (HCC): ICD-10-CM

## 2024-08-07 DIAGNOSIS — I25.118 CORONARY ARTERY DISEASE OF NATIVE ARTERY OF NATIVE HEART WITH STABLE ANGINA PECTORIS (HCC): ICD-10-CM

## 2024-08-07 DIAGNOSIS — L03.115 CELLULITIS OF RIGHT LOWER EXTREMITY: ICD-10-CM

## 2024-08-07 PROBLEM — D68.69 HYPERCOAGULABILITY DUE TO ATRIAL FIBRILLATION (HCC): Chronic | Status: RESOLVED | Noted: 2023-04-11 | Resolved: 2024-08-07

## 2024-08-07 SDOH — ECONOMIC STABILITY: FOOD INSECURITY: WITHIN THE PAST 12 MONTHS, YOU WORRIED THAT YOUR FOOD WOULD RUN OUT BEFORE YOU GOT MONEY TO BUY MORE.: NEVER TRUE

## 2024-08-07 SDOH — ECONOMIC STABILITY: FOOD INSECURITY: WITHIN THE PAST 12 MONTHS, THE FOOD YOU BOUGHT JUST DIDN'T LAST AND YOU DIDN'T HAVE MONEY TO GET MORE.: NEVER TRUE

## 2024-08-07 SDOH — ECONOMIC STABILITY: INCOME INSECURITY: HOW HARD IS IT FOR YOU TO PAY FOR THE VERY BASICS LIKE FOOD, HOUSING, MEDICAL CARE, AND HEATING?: NOT VERY HARD

## 2024-08-07 ASSESSMENT — PATIENT HEALTH QUESTIONNAIRE - PHQ9
4. FEELING TIRED OR HAVING LITTLE ENERGY: NOT AT ALL
SUM OF ALL RESPONSES TO PHQ QUESTIONS 1-9: 0
7. TROUBLE CONCENTRATING ON THINGS, SUCH AS READING THE NEWSPAPER OR WATCHING TELEVISION: NOT AT ALL
2. FEELING DOWN, DEPRESSED OR HOPELESS: NOT AT ALL
SUM OF ALL RESPONSES TO PHQ9 QUESTIONS 1 & 2: 0
1. LITTLE INTEREST OR PLEASURE IN DOING THINGS: NOT AT ALL
3. TROUBLE FALLING OR STAYING ASLEEP: NOT AT ALL
6. FEELING BAD ABOUT YOURSELF - OR THAT YOU ARE A FAILURE OR HAVE LET YOURSELF OR YOUR FAMILY DOWN: NOT AT ALL
5. POOR APPETITE OR OVEREATING: NOT AT ALL
9. THOUGHTS THAT YOU WOULD BE BETTER OFF DEAD, OR OF HURTING YOURSELF: NOT AT ALL
10. IF YOU CHECKED OFF ANY PROBLEMS, HOW DIFFICULT HAVE THESE PROBLEMS MADE IT FOR YOU TO DO YOUR WORK, TAKE CARE OF THINGS AT HOME, OR GET ALONG WITH OTHER PEOPLE: NOT DIFFICULT AT ALL
8. MOVING OR SPEAKING SO SLOWLY THAT OTHER PEOPLE COULD HAVE NOTICED. OR THE OPPOSITE, BEING SO FIGETY OR RESTLESS THAT YOU HAVE BEEN MOVING AROUND A LOT MORE THAN USUAL: NOT AT ALL
SUM OF ALL RESPONSES TO PHQ QUESTIONS 1-9: 0

## 2024-08-07 NOTE — PROGRESS NOTES
8/7/2024 1:23 PM  Location:[facility] PHYSICIAN SERVICES  [facility] [place]ICINE  [place]  Patient #:  [id number]  YOB: 1950          YOUR LAST HEMOGLOBIN A1CS:   No results found for: \"HBA1C\", \"[id number]\"    YOUR LAST LIPID PROFILE:   Lab Results   Component Value Date/Time    CHOL 185 04/11/2023 02:52 PM    HDL 53 04/11/2023 02:52 PM    LDL 60.8 04/11/2023 02:52 PM    VLDL 71.2 04/11/2023 02:52 PM    VLDL 27 04/01/2022 11:04 AM         Lab Results   Component Value Date/Time    GFRAA 78 03/04/2021 10:27 AM    BUN 11 07/31/2024 04:59 AM     07/31/2024 04:59 AM    K 3.6 07/31/2024 04:59 AM     07/31/2024 04:59 AM    CO2 25 07/31/2024 04:59 AM           History of Present Illness     Chief Complaint   Patient presents with   • Follow-Up from Hospital     TCM: Pt presents to the office today for a transitions of care from [facility].  Admission date: 7/27/2024  Discharge date: 7/31/2024     48 business hour phone call documented and reviewed in chart on date: 8/1/2024    Contour next rx         [facility]  [address] [address]  Phone: [phone]  Fax: [phone]      Transition of Care Note              Today's Date: 8/7/2024     Subjective:   HPI:  Jacqueline Tay 1950  presents for transitions of care from the  hospital . I have reviewed record obtained in hospital.  These symptoms are improving.    Admission date: 07/27/2024  Discharge date: 07/31/2024     48 business hour phone call documented and reviewed in chart on date: 08/01/2024    Patient Active Problem List   Diagnosis   • Depression   • Atrial fibrillation (HCC)   • Coronary artery disease with stable angina pectoris (HCC)   • CAD (coronary artery disease)   • Diabetes (HCC)   • HTN (hypertension)   • Type 2 diabetes mellitus with diabetic neuropathy   • Major depressive disorder, recurrent, moderate (HCC)   • Severe obesity (BMI 35.0-39.9) with comorbidity (HCC)   •

## 2024-08-09 NOTE — PROGRESS NOTES
NEW PATIENT INTAKE    Referral Diagnosis: Thrombocytopenia      Referring Provider: Yany Peterson DO    Primary Care Provider: Ned Love MD    Family History of Cancer/ Hematology Disorders: Mother with colon cancer; father with lung cancer    Presenting Symptoms: Thrombocytopenia    Chronological History of Pertinent Events:     72yo white female    7/27/24 - Patient went to ED ([facility])  C/o fever \"all afternoon\" today, urine incontinence today, Denies abd or back pain, no urinary odor/burning.   PMH: HTN, DMII, Afib    Hospital Course:  Patient with PMH of PAF on Eliquis, DM2, CAD, ALCIDES who presented with fever of 103 associated with generalized fatigue and urinary incontinence.     In ED, Tmax 100.7.  WBC 23 K.  Procalcitonin 3.25.  UA not consistent with infection.  CXR unremarkable.  Rapid COVID-negative.     Admitted with sepsis most likely due to R ankle and L great toe cellulitis.    ID was consulted and was suspicious for source possibly RLE cellulitis versus left great toe wound. Patient was started on broad-spectrum antibiotics.    Blood cultures from admission grew Streptococcus.   Repeat blood cultures done and thus far NGTD.   XR L foot shows irregularity involving the first DIP joint likely degenerative but cannot exclude osteomyelitis.    MRI left foot obtained and was negative for OM.       TTE shows EF 55 to 60% with normal diastolic function; small mobile echodensity off the posterior leaflet on the atrial aspect could be vegetation, consider BRAXTON for further evaluation.   Pt has a history of afib and converted back to afib with RVR during hospital course.   Cardiology consulted and performed BRAXTON 7/30 with MAC but no evidence of valvular vegetation.   Pt self converted back to NSR on 7/30. Cards signed off 7/30.     Patient remains feeling well during hospital course.  She was eager to go home and was discharged in medically stable condition on 7/31/24.  ID recommended for her to start

## 2024-08-12 ENCOUNTER — OFFICE VISIT (OUTPATIENT)
Dept: ONCOLOGY | Age: 74
End: 2024-08-12
Payer: MEDICARE

## 2024-08-12 ENCOUNTER — HOSPITAL ENCOUNTER (OUTPATIENT)
Dept: LAB | Age: 74
Discharge: HOME | End: 2024-08-15
Payer: MEDICARE

## 2024-08-12 VITALS
SYSTOLIC BLOOD PRESSURE: 112 MMHG | OXYGEN SATURATION: 94 % | HEART RATE: 53 BPM | DIASTOLIC BLOOD PRESSURE: 67 MMHG | TEMPERATURE: 98.8 F | WEIGHT: 183.9 LBS | RESPIRATION RATE: 16 BRPM | BODY MASS INDEX: 33.84 KG/M2 | HEIGHT: 62 IN

## 2024-08-12 DIAGNOSIS — D69.6 THROMBOCYTOPENIA (HCC): ICD-10-CM

## 2024-08-12 DIAGNOSIS — D69.6 THROMBOCYTOPENIA (HCC): Primary | ICD-10-CM

## 2024-08-12 LAB
ALBUMIN SERPL-MCNC: 3.3 G/DL (ref 3.2–4.6)
ALBUMIN/GLOB SERPL: 1 (ref 1–1.9)
ALP SERPL-CCNC: 70 U/L (ref 35–104)
ALT SERPL-CCNC: 14 U/L (ref 12–65)
ANION GAP SERPL CALC-SCNC: 11 MMOL/L (ref 9–18)
APTT PPP: 34.3 SEC (ref 23.3–37.4)
AST SERPL-CCNC: 18 U/L (ref 15–37)
BASOPHILS # BLD: 0.1 K/UL (ref 0–0.2)
BASOPHILS NFR BLD: 1 % (ref 0–2)
BILIRUB SERPL-MCNC: 0.5 MG/DL (ref 0–1.2)
BUN SERPL-MCNC: 28 MG/DL (ref 8–23)
CALCIUM SERPL-MCNC: 8.5 MG/DL (ref 8.8–10.2)
CHLORIDE SERPL-SCNC: 105 MMOL/L (ref 98–107)
CO2 SERPL-SCNC: 25 MMOL/L (ref 20–28)
CREAT SERPL-MCNC: 0.75 MG/DL (ref 0.6–1.1)
D DIMER PPP FEU-MCNC: 0.42 UG/ML(FEU)
DIFFERENTIAL METHOD BLD: ABNORMAL
EOSINOPHIL # BLD: 0.1 K/UL (ref 0–0.8)
EOSINOPHIL NFR BLD: 1 % (ref 0.5–7.8)
ERYTHROCYTE [DISTWIDTH] IN BLOOD BY AUTOMATED COUNT: 14.7 % (ref 11.9–14.6)
ERYTHROCYTE [SEDIMENTATION RATE] IN BLOOD: 20 MM/HR (ref 0–30)
FIBRINOGEN PPP-MCNC: 415 MG/DL (ref 190–501)
GLOBULIN SER CALC-MCNC: 3.4 G/DL (ref 2.3–3.5)
GLUCOSE SERPL-MCNC: 121 MG/DL (ref 70–99)
HCT VFR BLD AUTO: 42 % (ref 35.8–46.3)
HGB BLD-MCNC: 13.6 G/DL (ref 11.7–15.4)
IMM GRANULOCYTES # BLD AUTO: 0 K/UL (ref 0–0.5)
IMM GRANULOCYTES NFR BLD AUTO: 0 % (ref 0–5)
LYMPHOCYTES # BLD: 2.5 K/UL (ref 0.5–4.6)
LYMPHOCYTES NFR BLD: 28 % (ref 13–44)
MCH RBC QN AUTO: 30.5 PG (ref 26.1–32.9)
MCHC RBC AUTO-ENTMCNC: 32.4 G/DL (ref 31.4–35)
MCV RBC AUTO: 94.2 FL (ref 82–102)
MONOCYTES # BLD: 0.5 K/UL (ref 0.1–1.3)
MONOCYTES NFR BLD: 6 % (ref 4–12)
NEUTS SEG # BLD: 5.7 K/UL (ref 1.7–8.2)
NEUTS SEG NFR BLD: 64 % (ref 43–78)
NRBC # BLD: 0 K/UL (ref 0–0.2)
PLATELET # BLD AUTO: 73 K/UL (ref 150–450)
PLATELET # BLD AUTO: 86 K/UL (ref 150–450)
PMV BLD AUTO: 11 FL (ref 9.4–12.3)
POTASSIUM SERPL-SCNC: 4.1 MMOL/L (ref 3.5–5.1)
PROT SERPL-MCNC: 6.8 G/DL (ref 6.3–8.2)
RBC # BLD AUTO: 4.46 M/UL (ref 4.05–5.2)
SODIUM SERPL-SCNC: 141 MMOL/L (ref 136–145)
WBC # BLD AUTO: 8.7 K/UL (ref 4.3–11.1)

## 2024-08-12 PROCEDURE — 85025 COMPLETE CBC W/AUTO DIFF WBC: CPT

## 2024-08-12 PROCEDURE — 36415 COLL VENOUS BLD VENIPUNCTURE: CPT

## 2024-08-12 PROCEDURE — G8428 CUR MEDS NOT DOCUMENT: HCPCS | Performed by: INTERNAL MEDICINE

## 2024-08-12 PROCEDURE — 1123F ACP DISCUSS/DSCN MKR DOCD: CPT | Performed by: INTERNAL MEDICINE

## 2024-08-12 PROCEDURE — 1036F TOBACCO NON-USER: CPT | Performed by: INTERNAL MEDICINE

## 2024-08-12 PROCEDURE — 3017F COLORECTAL CA SCREEN DOC REV: CPT | Performed by: INTERNAL MEDICINE

## 2024-08-12 PROCEDURE — 99205 OFFICE O/P NEW HI 60 MIN: CPT | Performed by: INTERNAL MEDICINE

## 2024-08-12 PROCEDURE — G8417 CALC BMI ABV UP PARAM F/U: HCPCS | Performed by: INTERNAL MEDICINE

## 2024-08-12 PROCEDURE — 85379 FIBRIN DEGRADATION QUANT: CPT

## 2024-08-12 PROCEDURE — G8399 PT W/DXA RESULTS DOCUMENT: HCPCS | Performed by: INTERNAL MEDICINE

## 2024-08-12 PROCEDURE — 3074F SYST BP LT 130 MM HG: CPT | Performed by: INTERNAL MEDICINE

## 2024-08-12 PROCEDURE — 1111F DSCHRG MED/CURRENT MED MERGE: CPT | Performed by: INTERNAL MEDICINE

## 2024-08-12 PROCEDURE — 1090F PRES/ABSN URINE INCON ASSESS: CPT | Performed by: INTERNAL MEDICINE

## 2024-08-12 PROCEDURE — 85384 FIBRINOGEN ACTIVITY: CPT

## 2024-08-12 PROCEDURE — 80053 COMPREHEN METABOLIC PANEL: CPT

## 2024-08-12 PROCEDURE — 3078F DIAST BP <80 MM HG: CPT | Performed by: INTERNAL MEDICINE

## 2024-08-12 PROCEDURE — 85730 THROMBOPLASTIN TIME PARTIAL: CPT

## 2024-08-12 PROCEDURE — 85652 RBC SED RATE AUTOMATED: CPT

## 2024-08-12 PROCEDURE — 85049 AUTOMATED PLATELET COUNT: CPT

## 2024-08-12 ASSESSMENT — PATIENT HEALTH QUESTIONNAIRE - PHQ9
SUM OF ALL RESPONSES TO PHQ QUESTIONS 1-9: 0
SUM OF ALL RESPONSES TO PHQ9 QUESTIONS 1 & 2: 0
1. LITTLE INTEREST OR PLEASURE IN DOING THINGS: NOT AT ALL
SUM OF ALL RESPONSES TO PHQ QUESTIONS 1-9: 0
SUM OF ALL RESPONSES TO PHQ QUESTIONS 1-9: 0
2. FEELING DOWN, DEPRESSED OR HOPELESS: NOT AT ALL
SUM OF ALL RESPONSES TO PHQ QUESTIONS 1-9: 0

## 2024-08-12 NOTE — PATIENT INSTRUCTIONS
Patient Information from Today's Visit    The members of your [facility] are listed below:    Physician Provider: Salud Wallace Medical Oncologist  Advanced Practice Clinician: Rachel Andrade NP  Registered Nurse: Sobia BRYANT RN  Navigator: N/A  Medical Assistant: Johanny BRYANT MA  : Abi CHAUDHARI   Supportive Care Services: Tammy SCOTT LMSW    Diagnosis: thrombocytosis   Follow Up Instructions:   Reviewed labs  Reviewed medications  Labs today; ultrasound needed  Treatment Summary has been discussed and given to patient:N/A      Current Labs:              Please refer to After Visit Summary or MyChart for upcoming appointment information. Please call our office for rescheduling needs at least 24 hours before your scheduled appointment time.If you have any questions regarding your upcoming schedule please reach out to your care team through [employer] or call [phone].     Please notify your assigned Nurse Navigator of any unplanned hospital admissions or Emergency Room visits within 24 hours of discharge.    -------------------------------------------------------------------------------------------------------------------  Please call our office at [phone] if you have any  of the following symptoms:   Fever of 100.5 or greater  Chills  Shortness of breath  Swelling or pain in one leg    After office hours an answering service is available and will contact a provider for emergencies or if you are experiencing any of the above symptoms.        MARIELLE BOYER RN

## 2024-08-12 NOTE — PROGRESS NOTES
Outpatient Consult Note    Data Source: Patient, [facility] record.    8/12/2024  1:52 PM      Jacqueline Tay [id number]    73 y.o.    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  Per records, patient recently hospitalized with sepsis relating to right ankle and left great toe cellulitis.  Received broad-spectrum antibiotics and blood cultures grew strep.  MRI of left foot was negative for osteomyelitis.  Cardiology consulted and BRAXTON without evidence of valvular vegetation.  ID had recommended cefadroxil as outpatient x 2-week with end of treatment 8/10/2024.  She was also found to have chronic thrombocytopenia dating back to 2022 which ID felt had possibly worsened with antibiotics but hopefully transition to p.o. outpatient regimen will help.  Hematology also consulted.  Peripheral blood smear with true thrombocytopenia but without atypical cells.    Past Medical History:   Diagnosis Date    Arrhythmia     episode A.Fib with tachycardia 1/2014 (on a cruise) Converted back to normal sinus rhythm with Amiodarone    Arthritis     in hands    Cellulitis     history of cellulitis right foot, leg (hosp. 8/19/11) and 2nd episode in 1/2014 (not hospitalized);turned into ulcer on toe     Depression     Diabetes (HCC) dx 2010    type 2, oral med, avg fbs 130-150, notices s/s hypoglycemia at 60 (states has no happened in a long time), last HA1C 6.4    Diverticulosis of colon (without mention of hemorrhage)     Edema extremities     chronic (treated with daily Furosemide)    Foot ulcer (HCC)     GERD (gastroesophageal reflux disease)     resolved per pt    Hammer toe     Hypercholesterolemia     no medication    Hypertension     controlled with meds    Neuropathy     in bilateral

## 2024-08-13 LAB — PATH REV BLD -IMP: NORMAL

## 2024-08-19 ENCOUNTER — TELEPHONE (OUTPATIENT)
Age: 74
End: 2024-08-19

## 2024-08-20 NOTE — TELEPHONE ENCOUNTER
I called the pt and let her know that we will have samples of Eliquis ready for her. ACP will set them out.

## 2024-08-25 DIAGNOSIS — I10 ESSENTIAL (PRIMARY) HYPERTENSION: ICD-10-CM

## 2024-08-26 RX ORDER — BENAZEPRIL HYDROCHLORIDE 20 MG/1
20 TABLET ORAL DAILY
Qty: 90 TABLET | Refills: 3 | Status: SHIPPED | OUTPATIENT
Start: 2024-08-26

## 2024-08-27 ENCOUNTER — TELEPHONE (OUTPATIENT)
Age: 74
End: 2024-08-27

## 2024-09-03 ENCOUNTER — HOSPITAL ENCOUNTER (OUTPATIENT)
Dept: ULTRASOUND IMAGING | Age: 74
Discharge: HOME | End: 2024-09-06
Attending: INTERNAL MEDICINE
Payer: MEDICARE

## 2024-09-03 PROCEDURE — 76700 US EXAM ABDOM COMPLETE: CPT

## 2024-09-09 ENCOUNTER — PATIENT MESSAGE (OUTPATIENT)
Age: 74
End: 2024-09-09

## 2024-09-09 DIAGNOSIS — I10 HTN (HYPERTENSION): ICD-10-CM

## 2024-09-09 RX ORDER — ISOSORBIDE MONONITRATE 30 MG/1
30 TABLET, EXTENDED RELEASE ORAL DAILY
Qty: 90 TABLET | Refills: 0 | Status: SHIPPED | OUTPATIENT
Start: 2024-09-09

## 2024-09-16 DIAGNOSIS — D69.6 THROMBOCYTOPENIA (HCC): Primary | ICD-10-CM

## 2024-09-17 ENCOUNTER — TELEPHONE (OUTPATIENT)
Dept: ONCOLOGY | Age: 74
End: 2024-09-17

## 2024-11-18 ENCOUNTER — PATIENT MESSAGE (OUTPATIENT)
Age: 74
End: 2024-11-18

## 2024-12-04 ENCOUNTER — OFFICE VISIT (OUTPATIENT)
Age: 74
End: 2024-12-04
Payer: MEDICARE

## 2024-12-04 VITALS
WEIGHT: 174.4 LBS | SYSTOLIC BLOOD PRESSURE: 118 MMHG | DIASTOLIC BLOOD PRESSURE: 62 MMHG | HEIGHT: 62 IN | BODY MASS INDEX: 32.09 KG/M2 | HEART RATE: 56 BPM

## 2024-12-04 DIAGNOSIS — E78.5 HYPERLIPIDEMIA LDL GOAL <70: ICD-10-CM

## 2024-12-04 DIAGNOSIS — I48.91 ATRIAL FIBRILLATION, UNSPECIFIED TYPE (HCC): Primary | ICD-10-CM

## 2024-12-04 DIAGNOSIS — I10 PRIMARY HYPERTENSION: ICD-10-CM

## 2024-12-04 DIAGNOSIS — E87.6 HYPOKALEMIA: ICD-10-CM

## 2024-12-04 DIAGNOSIS — G47.33 OSA (OBSTRUCTIVE SLEEP APNEA): ICD-10-CM

## 2024-12-04 DIAGNOSIS — I25.10 ASCVD (ARTERIOSCLEROTIC CARDIOVASCULAR DISEASE): ICD-10-CM

## 2024-12-04 PROCEDURE — 3078F DIAST BP <80 MM HG: CPT | Performed by: INTERNAL MEDICINE

## 2024-12-04 PROCEDURE — 3074F SYST BP LT 130 MM HG: CPT | Performed by: INTERNAL MEDICINE

## 2024-12-04 PROCEDURE — 1123F ACP DISCUSS/DSCN MKR DOCD: CPT | Performed by: INTERNAL MEDICINE

## 2024-12-04 PROCEDURE — 1090F PRES/ABSN URINE INCON ASSESS: CPT | Performed by: INTERNAL MEDICINE

## 2024-12-04 PROCEDURE — G8484 FLU IMMUNIZE NO ADMIN: HCPCS | Performed by: INTERNAL MEDICINE

## 2024-12-04 PROCEDURE — 1126F AMNT PAIN NOTED NONE PRSNT: CPT | Performed by: INTERNAL MEDICINE

## 2024-12-04 PROCEDURE — G8399 PT W/DXA RESULTS DOCUMENT: HCPCS | Performed by: INTERNAL MEDICINE

## 2024-12-04 PROCEDURE — 99214 OFFICE O/P EST MOD 30 MIN: CPT | Performed by: INTERNAL MEDICINE

## 2024-12-04 PROCEDURE — G8417 CALC BMI ABV UP PARAM F/U: HCPCS | Performed by: INTERNAL MEDICINE

## 2024-12-04 PROCEDURE — G8428 CUR MEDS NOT DOCUMENT: HCPCS | Performed by: INTERNAL MEDICINE

## 2024-12-04 PROCEDURE — 3017F COLORECTAL CA SCREEN DOC REV: CPT | Performed by: INTERNAL MEDICINE

## 2024-12-04 PROCEDURE — 1036F TOBACCO NON-USER: CPT | Performed by: INTERNAL MEDICINE

## 2024-12-04 RX ORDER — ISOSORBIDE MONONITRATE 30 MG/1
30 TABLET, EXTENDED RELEASE ORAL DAILY
Qty: 90 TABLET | Refills: 3 | Status: SHIPPED | OUTPATIENT
Start: 2024-12-04

## 2024-12-04 RX ORDER — POTASSIUM CHLORIDE 1500 MG/1
20 TABLET, EXTENDED RELEASE ORAL DAILY
Qty: 90 TABLET | Refills: 3 | Status: SHIPPED | OUTPATIENT
Start: 2024-12-04

## 2024-12-04 NOTE — PROGRESS NOTES
[facility] CARDIOLOGY, [address], [address]  [place] [address]  PHONE: [phone]    SUBJECTIVE:   Jacqueline Tay is a 74 y.o. female 1950   seen for a follow up visit regarding the following:     Chief Complaint   Patient presents with    Atrial Fibrillation         History of Present Illness  The patient is a 74-year-old female who presents for evaluation of multiple medical concerns.    She reports that her atrial fibrillation (AFib) is well-managed as long as she maintains hydration and avoids sugar. She experienced two episodes of AFib during the Thanksgiving period, but these were not severe. She is currently on Eliquis, which she procures from [employer]. She has noticed some weight loss and believes her heart condition is stable.    She is also managing her diabetes and is on a diabetic diet. She is taking Jardiance as part of her treatment regimen.    She is not currently taking atorvastatin for her hyperlipidemia.    She is using a sleep apnea machine.        Interval history:   Cardiac History:     01/2014 echocardiogram - normal.      Atrial fibrillation during emergency room visit 02/2019.      02/2019 initiated on Metoprolol and Flecainide.      Stress perfusion study 2's 2019 lateral defect moderate risk and    Cath 2019 distical circumflex vessel 2 mm too small for intervention    Flecainide change to Multitaq with improvement of symptoms 3/27/2013    7/31/2020 Sinus  Bradycardia WITHIN NORMAL LIMITS  6/13/2023 sinus rhythm, normal rate, normal NM intervals, ST wave normal, normal axis,      Results  Laboratory Studies  A1c is 6.4, 6.5.       Assessment & Plan  1. Atrial Fibrillation.  She reports that her atrial fibrillation is well-controlled as long as she stays hydrated and avoids sugar. She has had two minor episodes recently, likely due to dietary indiscretions during Thanksgiving. Continuation of Eliquis and Multaq is advised. There are no cost issues with

## 2025-01-07 ENCOUNTER — PATIENT MESSAGE (OUTPATIENT)
Age: 75
End: 2025-01-07

## 2025-01-07 DIAGNOSIS — E11.40 TYPE 2 DIABETES MELLITUS WITH DIABETIC NEUROPATHY (HCC): Primary | ICD-10-CM

## 2025-01-07 DIAGNOSIS — I25.118 CORONARY ARTERY DISEASE WITH STABLE ANGINA PECTORIS (HCC): ICD-10-CM

## 2025-01-07 DIAGNOSIS — I25.10 CAD (CORONARY ARTERY DISEASE): ICD-10-CM

## 2025-01-14 ENCOUNTER — HOSPITAL ENCOUNTER (OUTPATIENT)
Dept: WOUND CARE | Age: 75
Discharge: HOME | End: 2025-01-14
Payer: MEDICARE

## 2025-01-14 VITALS
BODY MASS INDEX: 33.68 KG/M2 | DIASTOLIC BLOOD PRESSURE: 68 MMHG | HEART RATE: 57 BPM | RESPIRATION RATE: 18 BRPM | SYSTOLIC BLOOD PRESSURE: 144 MMHG | HEIGHT: 62 IN | WEIGHT: 183 LBS | TEMPERATURE: 97.8 F

## 2025-01-14 DIAGNOSIS — L97.512 SKIN ULCER OF THIRD TOE OF RIGHT FOOT WITH FAT LAYER EXPOSED (HCC): ICD-10-CM

## 2025-01-14 DIAGNOSIS — E11.42 DIABETIC POLYNEUROPATHY ASSOCIATED WITH TYPE 2 DIABETES MELLITUS (HCC): ICD-10-CM

## 2025-01-14 DIAGNOSIS — E11.621 TYPE 2 DIABETES MELLITUS WITH FOOT ULCER, WITHOUT LONG-TERM CURRENT USE OF INSULIN (HCC): Primary | ICD-10-CM

## 2025-01-14 DIAGNOSIS — L97.509 TYPE 2 DIABETES MELLITUS WITH FOOT ULCER, WITHOUT LONG-TERM CURRENT USE OF INSULIN (HCC): Primary | ICD-10-CM

## 2025-01-14 DIAGNOSIS — L97.522 SKIN ULCER OF LEFT GREAT TOE WITH FAT LAYER EXPOSED (HCC): ICD-10-CM

## 2025-01-14 PROCEDURE — 11042 DBRDMT SUBQ TIS 1ST 20SQCM/<: CPT

## 2025-01-14 PROCEDURE — 99213 OFFICE O/P EST LOW 20 MIN: CPT

## 2025-01-14 ASSESSMENT — ENCOUNTER SYMPTOMS
VOMITING: 0
NAUSEA: 0

## 2025-01-14 NOTE — PROGRESS NOTES
1030   Wound Etiology Diabetic Mota 2 01/14/25 1030   Dressing Status Old drainage noted 01/14/25 1030   Wound Cleansed Cleansed with saline 01/14/25 1030   Dressing/Treatment Gauze dressing/dressing sponge 01/14/25 1030   Offloading for Diabetic Foot Ulcers Offloading ordered 01/14/25 1030   Wound Length (cm) 0.3 cm 01/14/25 1030   Wound Width (cm) 0.5 cm 01/14/25 1030   Wound Depth (cm) 0.5 cm 01/14/25 1030   Wound Surface Area (cm^2) 0.15 cm^2 01/14/25 1030   Wound Volume (cm^3) 0.075 cm^3 01/14/25 1030   Post-Procedure Length (cm) 0.5 cm 01/14/25 1030   Post-Procedure Width (cm) 0.6 cm 01/14/25 1030   Post-Procedure Depth (cm) 0.5 cm 01/14/25 1030   Post-Procedure Surface Area (cm^2) 0.3 cm^2 01/14/25 1030   Post-Procedure Volume (cm^3) 0.15 cm^3 01/14/25 1030   Wound Assessment Dry;Other (Comment) 01/14/25 1030   Drainage Amount Small (< 25%) 01/14/25 1030   Drainage Description Serosanguinous 01/14/25 1030   Odor None 01/14/25 1030   Jennifer-wound Assessment Dry/flaky;Hyperkeratosis (callous) 01/14/25 1030   Margins Undefined edges 01/14/25 1030   Wound Thickness Description not for Pressure Injury Full thickness 01/14/25 1030   Number of days: 0     This dictation may have been generated in part by voice recognition computer software. Although all attempts are made to edit the dictation for accuracy, there may be errors in the transcription that are not intended.    Electronically signed by DORON Patrick NP on 1/14/2025 at 1:09 PM.

## 2025-01-14 NOTE — ASSESSMENT & PLAN NOTE
Assessment  Small wound to distal tip right 3rd toe; wound is small with callus to periwound, toe is not edematous or erythematous  Has been treating wound with Neosporin as directed by her podiatrist  Has custom diabetic shoes  Most recent A1c 6.5 on 7/28/24  Plan  Excisional debridement to remove devitalized tissue and promote wound healing  Wound care: Vashe cleanse, collagen AG, change 3x weekly  Offload with custom shoe  RTC 1 week

## 2025-01-14 NOTE — ASSESSMENT & PLAN NOTE
Assessment  Left great toe wound; wound has depth but negative probe to bone, periwound is heavy hemorrhagic callus  Toe is mildly edematous but not erythematous  Wound has been present for 1 year; patient has been treating with Neosporin per podiatry  Toe was last imaged in July of 2024  Arterial duplex from June of 2023: left JOSEFINA 1.06, TP 88  Patient is offloading with custom shoes  Plan  Excisional debridement to remove devitalized tissue and promote wound healing  Obtain XR left great toe  Wound care: Vashe cleanse, collagen AG, hydrofera blue; change 3x weekly  Recommend post op shoe with peg assist insert for offloading  RTC 1 week

## 2025-01-14 NOTE — WOUND CARE
UNABLE TO OBTAIN WOUND SUPPLIES, CONTINUE TO USE THE SUPPLIES YOU HAVE AVAILABLE UNTIL YOU ARE ABLE TO REACH US. IT IS MOST IMPORTANT TO KEEP THE WOUND COVERED AT ALL TIMES.    Electronically signed ANGEL LANCE RN on 1/14/2025 at 10:53 AM

## 2025-01-14 NOTE — FLOWSHEET NOTE
01/14/25 1030   Right Leg Edema Point of Measurement   Leg circumference 33 cm   Ankle circumference 21 cm   Foot circumference 20 cm   Compression Therapy Tubular elastic support bandage   Left Leg Edema Point of Measurement   Leg circumference 34 cm   Ankle circumference 20 cm   Foot circumference 22 cm   Compression Therapy Tubular elastic support bandage   RLE Neurovascular Assessment   Capillary Refill Less than/Equal to 3 seconds   Color Appropriate for Ethnicity   Temperature Warm   R Post Tibial Pulse +2 (Moderate)   LLE Neurovascular Assessment   Capillary Refill Less than/Equal to 3 seconds   Color Appropriate for Ethnicity   Temperature Warm   L Post Tibial Pulse +2 (Moderate)   Wound 01/14/25 Toe (Comment  which one) Right #1 3rd toe   Date First Assessed/Time First Assessed: 01/14/25 1038   Present on Original Admission: Yes  Wound Approximate Age at First Assessment (Weeks): 50 weeks  Primary Wound Type: Diabetic Ulcer  Location: (c) Toe (Comment  which one)  Wound Location [place]...   Wound Image     Wound Etiology Diabetic Mota 2   Dressing Status Old drainage noted   Wound Cleansed Cleansed with saline   Dressing/Treatment Gauze dressing/dressing sponge   Offloading for Diabetic Foot Ulcers Offloading ordered   Wound Length (cm) 0.3 cm   Wound Width (cm) 0.3 cm   Wound Depth (cm) 0.3 cm   Wound Surface Area (cm^2) 0.09 cm^2   Wound Volume (cm^3) 0.027 cm^3   Post-Procedure Length (cm) 0.4 cm   Post-Procedure Width (cm) 0.3 cm   Post-Procedure Depth (cm) 0.3 cm   Post-Procedure Surface Area (cm^2) 0.12 cm^2   Post-Procedure Volume (cm^3) 0.036 cm^3   Wound Assessment Pink/red;Other (Comment)  (callous)   Drainage Amount Scant (moist but unmeasurable)   Drainage Description Serosanguinous   Odor None   Jennifer-wound Assessment Hyperkeratosis (callous)   Margins Attached edges   Wound Thickness Description not for Pressure Injury Full thickness   Wound 01/14/25 Toe (Comment  which one) Left #2 Left

## 2025-01-15 RX ORDER — SILVER SULFADIAZINE 10 MG/G
CREAM TOPICAL ONCE
OUTPATIENT
Start: 2025-01-15 | End: 2025-01-15

## 2025-01-15 RX ORDER — GINSENG 100 MG
CAPSULE ORAL ONCE
OUTPATIENT
Start: 2025-01-15 | End: 2025-01-15

## 2025-01-15 RX ORDER — CLOBETASOL PROPIONATE 0.5 MG/G
OINTMENT TOPICAL ONCE
OUTPATIENT
Start: 2025-01-15 | End: 2025-01-15

## 2025-01-15 RX ORDER — TRIAMCINOLONE ACETONIDE 1 MG/G
OINTMENT TOPICAL ONCE
OUTPATIENT
Start: 2025-01-15 | End: 2025-01-15

## 2025-01-15 RX ORDER — BETAMETHASONE DIPROPIONATE 0.5 MG/G
CREAM TOPICAL ONCE
OUTPATIENT
Start: 2025-01-15 | End: 2025-01-15

## 2025-01-15 RX ORDER — SODIUM CHLOR/HYPOCHLOROUS ACID 0.033 %
SOLUTION, IRRIGATION IRRIGATION ONCE
OUTPATIENT
Start: 2025-01-15 | End: 2025-01-15

## 2025-01-15 RX ORDER — MUPIROCIN 20 MG/G
OINTMENT TOPICAL ONCE
OUTPATIENT
Start: 2025-01-15 | End: 2025-01-15

## 2025-01-15 RX ORDER — BACITRACIN ZINC AND POLYMYXIN B SULFATE 500; 1000 [USP'U]/G; [USP'U]/G
OINTMENT TOPICAL ONCE
OUTPATIENT
Start: 2025-01-15 | End: 2025-01-15

## 2025-01-15 RX ORDER — NEOMYCIN/BACITRACIN/POLYMYXINB 3.5-400-5K
OINTMENT (GRAM) TOPICAL ONCE
OUTPATIENT
Start: 2025-01-15 | End: 2025-01-15

## 2025-01-15 RX ORDER — GENTAMICIN SULFATE 1 MG/G
OINTMENT TOPICAL ONCE
OUTPATIENT
Start: 2025-01-15 | End: 2025-01-15

## 2025-01-21 ENCOUNTER — HOSPITAL ENCOUNTER (OUTPATIENT)
Dept: WOUND CARE | Age: 75
Discharge: HOME | End: 2025-01-21
Payer: MEDICARE

## 2025-01-21 ENCOUNTER — HOSPITAL ENCOUNTER (OUTPATIENT)
Dept: GENERAL RADIOLOGY | Age: 75
Discharge: HOME | End: 2025-01-24
Payer: MEDICARE

## 2025-01-21 VITALS
HEIGHT: 62 IN | DIASTOLIC BLOOD PRESSURE: 65 MMHG | BODY MASS INDEX: 33.68 KG/M2 | HEART RATE: 62 BPM | TEMPERATURE: 98.5 F | SYSTOLIC BLOOD PRESSURE: 136 MMHG | WEIGHT: 183 LBS | RESPIRATION RATE: 16 BRPM

## 2025-01-21 DIAGNOSIS — L97.522 SKIN ULCER OF LEFT GREAT TOE WITH FAT LAYER EXPOSED (HCC): ICD-10-CM

## 2025-01-21 DIAGNOSIS — L97.516 DIABETIC ULCER OF TOE OF RIGHT FOOT ASSOCIATED WITH TYPE 2 DIABETES MELLITUS, WITH BONE INVOLVEMENT WITHOUT EVIDENCE OF NECROSIS (HCC): Chronic | ICD-10-CM

## 2025-01-21 DIAGNOSIS — E11.621 DIABETIC ULCER OF TOE OF RIGHT FOOT ASSOCIATED WITH TYPE 2 DIABETES MELLITUS, WITH BONE INVOLVEMENT WITHOUT EVIDENCE OF NECROSIS (HCC): Primary | Chronic | ICD-10-CM

## 2025-01-21 DIAGNOSIS — L97.522 SKIN ULCER OF LEFT GREAT TOE WITH FAT LAYER EXPOSED (HCC): Chronic | ICD-10-CM

## 2025-01-21 DIAGNOSIS — L97.512 SKIN ULCER OF THIRD TOE OF RIGHT FOOT WITH FAT LAYER EXPOSED (HCC): Chronic | ICD-10-CM

## 2025-01-21 DIAGNOSIS — E11.621 DIABETIC ULCER OF TOE OF RIGHT FOOT ASSOCIATED WITH TYPE 2 DIABETES MELLITUS, WITH BONE INVOLVEMENT WITHOUT EVIDENCE OF NECROSIS (HCC): Chronic | ICD-10-CM

## 2025-01-21 DIAGNOSIS — L97.516 DIABETIC ULCER OF TOE OF RIGHT FOOT ASSOCIATED WITH TYPE 2 DIABETES MELLITUS, WITH BONE INVOLVEMENT WITHOUT EVIDENCE OF NECROSIS (HCC): Primary | Chronic | ICD-10-CM

## 2025-01-21 DIAGNOSIS — E11.42 DIABETIC POLYNEUROPATHY ASSOCIATED WITH TYPE 2 DIABETES MELLITUS (HCC): ICD-10-CM

## 2025-01-21 PROCEDURE — 73660 X-RAY EXAM OF TOE(S): CPT

## 2025-01-21 PROCEDURE — 11042 DBRDMT SUBQ TIS 1ST 20SQCM/<: CPT

## 2025-01-21 NOTE — PROGRESS NOTES
[facility]  Procedure Note    Jacqueline Tay  MEDICAL RECORD NUMBER: [id number]  AGE: 74 y.o.     GENDER: female    : 1950  EPISODE DATE: 2025    Problem List Items Addressed This Visit          Endocrine    * (Principal) Diabetic ulcer of toe of right foot associated with type 2 diabetes mellitus, with bone involvement without evidence of necrosis (HCC) - Primary (Chronic)    Relevant Orders    XR TOE RIGHT (MIN 2 VIEWS)    Diabetic polyneuropathy associated with type 2 diabetes mellitus (HCC) (Chronic)       Other    Skin ulcer of left great toe with fat layer exposed (HCC) (Chronic)     Assessment  Left great toe wound; wound has depth but negative probe to bone, periwound is heavy callus  Toe is mildly edematous and erythematous  Patient reports post op shoe arrives today  She forgot to get XR after last visit  Plan  Excisional debridement to remove devitalized tissue and promote wound healing  Obtain XR left great toe  Wound care: Vashe cleanse, collagen AG, hydrofera blue; change 3x weekly  Offload with Darco  RTC 1 week         Skin ulcer of third toe of right foot with fat layer exposed (HCC) (Chronic)     Assessment  Distal tip right 3rd toe - wound is small with callus to periwound, toe is slightly more edematous and erythematous  Plan  Excisional debridement to remove devitalized tissue and promote wound healing  XR right 3rd toe  Wound care: Vashe cleanse, collagen AG, change 3x weekly  Offload with custom shoe  RTC 1 week           Procedure Note: Excisional Debridement  Indications: Based on my examination of this patient's wound(s) today, debridement is required to remove devitalized tissue, evaluate the wound base, and promote wound healing.  Performed by: DORON Patrick - TIFFANI  Consent obtained: Yes  Time out taken: Yes  Pain control:     Wound #: 1 and 2  Diabetic/pressure/chronic non-pressure ulcers only: Diabetic ulcer, fat layer

## 2025-01-21 NOTE — FLOWSHEET NOTE
01/21/25 1137   Wound 01/14/25 Toe (Comment  which one) Left #2 Left Great toe   Date First Assessed/Time First Assessed: 01/14/25 1038   Present on Original Admission: Yes  Wound Approximate Age at First Assessment (Weeks): 50 weeks  Primary Wound Type: Diabetic Ulcer  Location: (c) Toe (Comment  which one)  Wound Location [place]...   Wound Image     Wound Etiology Diabetic Mota 2   Dressing Status Old drainage noted   Wound Cleansed Cleansed with saline   Dressing/Treatment Collagen;Hydrofera blue   Offloading for Diabetic Foot Ulcers Offloading ordered   Wound Length (cm) 0.4 cm   Wound Width (cm) 0.4 cm   Wound Depth (cm) 0.1 cm   Wound Surface Area (cm^2) 0.16 cm^2   Change in Wound Size % (l*w) -6.67   Wound Volume (cm^3) 0.016 cm^3   Wound Healing % 79   Post-Procedure Length (cm) 0.5 cm   Post-Procedure Width (cm) 0.5 cm   Post-Procedure Depth (cm) 0.2 cm   Post-Procedure Surface Area (cm^2) 0.25 cm^2   Post-Procedure Volume (cm^3) 0.05 cm^3   Wound Assessment Pink/red   Drainage Amount Small (< 25%)   Drainage Description Sanguinous   Odor None   Jennifer-wound Assessment Dry/flaky   Wound Thickness Description not for Pressure Injury Full thickness   Wound 01/14/25 Toe (Comment  which one) Right #1 3rd toe   Date First Assessed/Time First Assessed: 01/14/25 1038   Present on Original Admission: Yes  Wound Approximate Age at First Assessment (Weeks): 50 weeks  Primary Wound Type: Diabetic Ulcer  Location: (c) Toe (Comment  which one)  Wound Location [place]...   Wound Image     Wound Etiology Diabetic Mota 2   Dressing Status Old drainage noted   Wound Cleansed Cleansed with saline   Dressing/Treatment Collagen;Hydrofera blue   Offloading for Diabetic Foot Ulcers Offloading ordered   Wound Length (cm) 0.5 cm   Wound Width (cm) 0.2 cm   Wound Depth (cm) 0.2 cm   Wound Surface Area (cm^2) 0.1 cm^2   Change in Wound Size % (l*w) -11.11   Wound Volume (cm^3) 0.02 cm^3   Wound Healing % 26   Post-Procedure

## 2025-01-21 NOTE — ASSESSMENT & PLAN NOTE
Assessment  Left great toe wound; wound has depth but negative probe to bone, periwound is heavy callus  Toe is mildly edematous and erythematous  Patient reports post op shoe arrives today  She forgot to get XR after last visit  Plan  Excisional debridement to remove devitalized tissue and promote wound healing  Obtain XR left great toe  Wound care: Vashe cleanse, collagen AG, hydrofera blue; change 3x weekly  Offload with Darco  RTC 1 week

## 2025-01-21 NOTE — ASSESSMENT & PLAN NOTE
Assessment  Distal tip right 3rd toe - wound is small with callus to periwound, toe is slightly more edematous and erythematous  Plan  Excisional debridement to remove devitalized tissue and promote wound healing  XR right 3rd toe  Wound care: Vashe cleanse, collagen AG, change 3x weekly  Offload with custom shoe  RTC 1 week

## 2025-01-21 NOTE — WOUND CARE
Discharge Instructions for  [facility]  [address]  [address]  [place] [address]  Phone [phone]   Fax [phone]      NAME:  Jacqueline Tay  YOB: 1950  MEDICAL RECORD NUMBER:  [id number]  DATE:  1/21/2025    Return Appointment:   1 week with Orquidea Cabral NP    Instructions:     Left Great toe and right 3rd toe:  Cleanse with normal saline  Allow Vashe Wound Solution moistened gauze to sit on wound bed for 60 seconds  Apply Collagen to wound bed  Cover with Hydrofera blue ready  Secure with rolled gauze and tape  Change 3 times a week    Wear tubigrip from knees to toes on bilateral legs.     Silver Nitrate used this visit.     Patient to offload wound with DARCO SHOE WITH PEG ASSIST INSERT while standing or weight bearing.    X-Ray ordered today. You do not need an appointment for this test. You may go directly to outpatient radiology.  Elevate legs when sitting.  Avoid prolonged standing or sitting with legs in dependent position.  Increase protein intake to promote wound healing. Brandon and Ensure are examples of protein supplements.  Wound healing is greatly slowed when glucose levels are greater than 200. Monitor glucose levels to ensure tight glucose control.    Protein:   Egg ~ 6g  Yogurt ~ 15g  Half cup of cottage cheese ~ 15g  Chicken breast ~ 30g  Truxton filet ~ 40g       Should you experience increased redness, swelling, pain, foul odor, size of wound(s), or have a temperature over 101 degrees please contact the Wound Healing Center at [phone] or if after hours contact your primary care physician or go to the hospital emergency department.    PLEASE NOTE: IF YOU ARE UNABLE TO OBTAIN WOUND SUPPLIES, CONTINUE TO USE THE SUPPLIES YOU HAVE AVAILABLE UNTIL YOU ARE ABLE TO REACH US. IT IS MOST IMPORTANT TO KEEP THE WOUND COVERED AT ALL TIMES.    Electronically signed Josiane Guan RN on 1/21/2025 at 12:06 PM

## 2025-01-27 ENCOUNTER — HOSPITAL ENCOUNTER (OUTPATIENT)
Dept: SLEEP CENTER | Age: 75
Discharge: HOME | End: 2025-01-30

## 2025-01-28 ENCOUNTER — HOSPITAL ENCOUNTER (OUTPATIENT)
Dept: WOUND CARE | Age: 75
Discharge: HOME | End: 2025-01-28
Payer: MEDICARE

## 2025-01-28 VITALS
TEMPERATURE: 97.7 F | HEIGHT: 62 IN | RESPIRATION RATE: 18 BRPM | SYSTOLIC BLOOD PRESSURE: 130 MMHG | HEART RATE: 75 BPM | BODY MASS INDEX: 32.39 KG/M2 | DIASTOLIC BLOOD PRESSURE: 65 MMHG | WEIGHT: 176 LBS

## 2025-01-28 DIAGNOSIS — E11.621 TYPE 2 DIABETES MELLITUS WITH FOOT ULCER, WITHOUT LONG-TERM CURRENT USE OF INSULIN (HCC): Primary | Chronic | ICD-10-CM

## 2025-01-28 DIAGNOSIS — E11.42 DIABETIC POLYNEUROPATHY ASSOCIATED WITH TYPE 2 DIABETES MELLITUS (HCC): Chronic | ICD-10-CM

## 2025-01-28 DIAGNOSIS — L97.512 SKIN ULCER OF THIRD TOE OF RIGHT FOOT WITH FAT LAYER EXPOSED (HCC): Chronic | ICD-10-CM

## 2025-01-28 DIAGNOSIS — L97.522 SKIN ULCER OF LEFT GREAT TOE WITH FAT LAYER EXPOSED (HCC): Chronic | ICD-10-CM

## 2025-01-28 DIAGNOSIS — L97.522 DIABETIC ULCER OF TOE OF LEFT FOOT ASSOCIATED WITH TYPE 2 DIABETES MELLITUS, WITH FAT LAYER EXPOSED (HCC): ICD-10-CM

## 2025-01-28 DIAGNOSIS — E11.621 DIABETIC ULCER OF TOE OF LEFT FOOT ASSOCIATED WITH TYPE 2 DIABETES MELLITUS, WITH FAT LAYER EXPOSED (HCC): ICD-10-CM

## 2025-01-28 DIAGNOSIS — L97.509 TYPE 2 DIABETES MELLITUS WITH FOOT ULCER, WITHOUT LONG-TERM CURRENT USE OF INSULIN (HCC): Primary | Chronic | ICD-10-CM

## 2025-01-28 PROCEDURE — 11042 DBRDMT SUBQ TIS 1ST 20SQCM/<: CPT

## 2025-01-28 RX ORDER — TRIAMCINOLONE ACETONIDE 1 MG/G
OINTMENT TOPICAL ONCE
OUTPATIENT
Start: 2025-01-28 | End: 2025-01-28

## 2025-01-28 RX ORDER — DOXYCYCLINE HYCLATE 100 MG
100 TABLET ORAL 2 TIMES DAILY
Qty: 20 TABLET | Refills: 0 | Status: SHIPPED | OUTPATIENT
Start: 2025-01-28 | End: 2025-02-07

## 2025-01-28 RX ORDER — GENTAMICIN SULFATE 1 MG/G
OINTMENT TOPICAL ONCE
OUTPATIENT
Start: 2025-01-28 | End: 2025-01-28

## 2025-01-28 RX ORDER — BETAMETHASONE DIPROPIONATE 0.5 MG/G
CREAM TOPICAL ONCE
OUTPATIENT
Start: 2025-01-28 | End: 2025-01-28

## 2025-01-28 RX ORDER — GINSENG 100 MG
CAPSULE ORAL ONCE
OUTPATIENT
Start: 2025-01-28 | End: 2025-01-28

## 2025-01-28 RX ORDER — CLOBETASOL PROPIONATE 0.5 MG/G
OINTMENT TOPICAL ONCE
OUTPATIENT
Start: 2025-01-28 | End: 2025-01-28

## 2025-01-28 RX ORDER — MUPIROCIN 20 MG/G
OINTMENT TOPICAL ONCE
OUTPATIENT
Start: 2025-01-28 | End: 2025-01-28

## 2025-01-28 RX ORDER — BACITRACIN ZINC AND POLYMYXIN B SULFATE 500; 1000 [USP'U]/G; [USP'U]/G
OINTMENT TOPICAL ONCE
OUTPATIENT
Start: 2025-01-28 | End: 2025-01-28

## 2025-01-28 RX ORDER — NEOMYCIN/BACITRACIN/POLYMYXINB 3.5-400-5K
OINTMENT (GRAM) TOPICAL ONCE
OUTPATIENT
Start: 2025-01-28 | End: 2025-01-28

## 2025-01-28 RX ORDER — SODIUM CHLOR/HYPOCHLOROUS ACID 0.033 %
SOLUTION, IRRIGATION IRRIGATION ONCE
OUTPATIENT
Start: 2025-01-28 | End: 2025-01-28

## 2025-01-28 RX ORDER — SILVER SULFADIAZINE 10 MG/G
CREAM TOPICAL ONCE
OUTPATIENT
Start: 2025-01-28 | End: 2025-01-28

## 2025-01-28 NOTE — DISCHARGE INSTRUCTIONS
Instructions:      Left Great toe and right 3rd toe:  Cleanse with normal saline  Allow Vashe Wound Solution moistened gauze to sit on wound bed for 60 seconds  Apply Collagen to wound bed  Cover with Hydrofera blue ready  Secure with rolled gauze and tape  Change 3 times a week     Wear tubigrip from knees to toes on bilateral legs.      MRI ordered at today's visit. Please call scheduling at [phone].     Doxycycline sent to your pharmacy. Please  and start taking. Nausea medication also sent if Doxy makes you nauseated.     Patient to offload wound with DARCO SHOE WITH PEG ASSIST INSERT while standing or weight bearing.     Silver nitrate used at today's visit.     Elevate legs when sitting.  Avoid prolonged standing or sitting with legs in dependent position.  Increase protein intake to promote wound healing. Brandon and Ensure are examples of protein supplements.  Wound healing is greatly slowed when glucose levels are greater than 200. Monitor glucose levels to ensure tight glucose control.

## 2025-01-28 NOTE — WOUND CARE
Discharge Instructions for  [facility]  [address]  [address]  [place] [address]  Phone [phone]   Fax [phone]      NAME:  Jacqueline Tay  YOB: 1950  MEDICAL RECORD NUMBER:  [id number]  DATE:  1/21/2025    Return Appointment:   1 week with Orquidea Cabral NP    Instructions:     Left Great toe and right 3rd toe:  Cleanse with normal saline  Allow Vashe Wound Solution moistened gauze to sit on wound bed for 60 seconds  Apply Collagen to wound bed  Cover with Hydrofera blue ready  Secure with rolled gauze and tape  Change 3 times a week    Wear tubigrip from knees to toes on bilateral legs.     MRI ordered at today's visit. Please call scheduling at [phone].    Doxycycline sent to your pharmacy. Please  and start taking. Nausea medication also sent if Doxy makes you nauseated.    Patient to offload wound with DARCO SHOE WITH PEG ASSIST INSERT while standing or weight bearing.    Silver nitrate used at today's visit.    Elevate legs when sitting.  Avoid prolonged standing or sitting with legs in dependent position.  Increase protein intake to promote wound healing. Brandon and Ensure are examples of protein supplements.  Wound healing is greatly slowed when glucose levels are greater than 200. Monitor glucose levels to ensure tight glucose control.    Protein:   Egg ~ 6g  Yogurt ~ 15g  Half cup of cottage cheese ~ 15g  Chicken breast ~ 30g  Blunt filet ~ 40g     Should you experience increased redness, swelling, pain, foul odor, size of wound(s), or have a temperature over 101 degrees please contact the Wound Healing Center at [phone] or if after hours contact your primary care physician or go to the hospital emergency department.    PLEASE NOTE: IF YOU ARE UNABLE TO OBTAIN WOUND SUPPLIES, CONTINUE TO USE THE SUPPLIES YOU HAVE AVAILABLE UNTIL YOU ARE ABLE TO REACH US. IT IS MOST IMPORTANT TO KEEP THE WOUND COVERED AT ALL TIMES.    Electronically

## 2025-01-28 NOTE — ASSESSMENT & PLAN NOTE
Assessment  Distal tip right 3rd toe - wound is small with callus to periwound, toe is slightly more edematous and erythematous  XR: \"No radiographic evidence to suggest the presence of osteomyelitis. If osteomyelitis is still clinically suspected MRI with and without IV contrast is suggested for further evaluation.\"  Plan  Excisional debridement to remove devitalized tissue and promote wound healing  Will obtain MRI right foot to evaluate for underlying OM  Wound care: Vashe cleanse, collagen AG, change 3x weekly  Offload with custom shoe  RTC 1 week

## 2025-01-28 NOTE — FLOWSHEET NOTE
01/28/25 1336   Right Leg Edema Point of Measurement   Leg circumference 32 cm   Ankle circumference 20.5 cm   Foot circumference 21 cm   Compression Therapy Tubular elastic support bandage   Left Leg Edema Point of Measurement   Leg circumference 33 cm   Ankle circumference 21 cm   Foot circumference 22 cm   Compression Therapy Tubular elastic support bandage   Wound 01/14/25 Toe (Comment  which one) Right #1 3rd toe   Date First Assessed/Time First Assessed: 01/14/25 1038   Present on Original Admission: Yes  Wound Approximate Age at First Assessment (Weeks): 50 weeks  Primary Wound Type: Diabetic Ulcer  Location: (c) Toe (Comment  which one)  Wound Location [place]...   Wound Image    Wound Etiology Diabetic Mota 2   Dressing Status Old drainage noted   Wound Cleansed Cleansed with saline   Dressing/Treatment Collagen;Hydrofera blue   Offloading for Diabetic Foot Ulcers Offloading ordered   Wound Length (cm) 0.5 cm   Wound Width (cm) 0.5 cm   Wound Depth (cm) 0.3 cm   Wound Surface Area (cm^2) 0.25 cm^2   Change in Wound Size % (l*w) -177.78   Wound Volume (cm^3) 0.075 cm^3   Wound Healing % -178   Post-Procedure Length (cm) 0.6 cm   Post-Procedure Width (cm) 0.6 cm   Post-Procedure Depth (cm) 0.2 cm   Post-Procedure Surface Area (cm^2) 0.36 cm^2   Post-Procedure Volume (cm^3) 0.072 cm^3   Wound Assessment Pink/red   Drainage Amount Small (< 25%)   Drainage Description Sanguinous   Odor None   Jennifer-wound Assessment Hyperkeratosis (callous)   Margins Undefined edges   Wound Thickness Description not for Pressure Injury Full thickness   Wound 01/14/25 Toe (Comment  which one) Left #2 Left Great toe   Date First Assessed/Time First Assessed: 01/14/25 1038   Present on Original Admission: Yes  Wound Approximate Age at First Assessment (Weeks): 50 weeks  Primary Wound Type: Diabetic Ulcer  Location: (c) Toe (Comment  which one)  Wound Location [place]...   Wound Image    Wound Etiology Diabetic Mota 2   Dressing

## 2025-01-28 NOTE — PROGRESS NOTES
[facility]  Procedure Note    Jacqueline Tay  MEDICAL RECORD NUMBER: [id number]  AGE: 74 y.o.     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)     Assessment  Left great toe wound; wound depth is near to bone, periwound is heavy hemorrhagic callus  Toe is mildly edematous and erythematous  XR: \"Soft tissue swelling of the right foot with no evidence to suggest the presence of osteomyelitis\"  Plan  Excisional debridement to remove devitalized tissue and promote wound healing  Due to wound deterioration, will obtain MRI of left foot to evaluate for osteomyelitis  Doxycyline 100 mg BID x 10 days  Wound care: Vashe cleanse, collagen AG, hydrofera blue; change 3x weekly  Offload with Darco; consider TCC in future  RTC 1 week         Relevant Orders    MRI FOOT LEFT W WO CONTRAST    MRI FOOT RIGHT W WO CONTRAST    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 If osteomyelitis is still clinically suspected MRI with and without IV contrast is suggested for further evaluation.\"  Plan  Excisional debridement to remove devitalized tissue and promote wound healing  Will obtain MRI right foot to evaluate for underlying OM  Wound care: Vashe cleanse, collagen AG, change 3x

## 2025-01-28 NOTE — ASSESSMENT & PLAN NOTE
Assessment  Left great toe wound; wound depth is near to bone, periwound is heavy hemorrhagic callus  Toe is mildly edematous and erythematous  XR: \"Soft tissue swelling of the right foot with no evidence to suggest the presence of osteomyelitis\"  Plan  Excisional debridement to remove devitalized tissue and promote wound healing  Due to wound deterioration, will obtain MRI of left foot to evaluate for osteomyelitis  Doxycyline 100 mg BID x 10 days  Wound care: Vashe cleanse, collagen AG, hydrofera blue; change 3x weekly  Offload with Darco; consider TCC in future  RTC 1 week

## 2025-01-29 ENCOUNTER — PATIENT MESSAGE (OUTPATIENT)
Dept: INTERNAL MEDICINE CLINIC | Facility: CLINIC | Age: 75
End: 2025-01-29

## 2025-01-29 RX ORDER — CITALOPRAM HYDROBROMIDE 20 MG/1
20 TABLET ORAL DAILY
Qty: 90 TABLET | Refills: 1 | Status: SHIPPED | OUTPATIENT
Start: 2025-01-29

## 2025-01-29 RX ORDER — ONDANSETRON 4 MG/1
4 TABLET, ORALLY DISINTEGRATING ORAL EVERY 8 HOURS PRN
Qty: 30 TABLET | Refills: 0 | Status: SHIPPED | OUTPATIENT
Start: 2025-01-29 | End: 2025-02-08

## 2025-01-29 NOTE — TELEPHONE ENCOUNTER
Rx pending below to be sent to pt's pharmacy.      Next appt with provider   Pt has no upcoming appt scheduled with CMS  Sent pt a [employer] msg to contact the office and schedule an appt.

## 2025-02-03 ENCOUNTER — HOSPITAL ENCOUNTER (OUTPATIENT)
Dept: SLEEP CENTER | Age: 75
Discharge: HOME | End: 2025-02-06

## 2025-02-04 ENCOUNTER — HOSPITAL ENCOUNTER (OUTPATIENT)
Dept: WOUND CARE | Age: 75
Discharge: HOME | End: 2025-02-04
Payer: MEDICARE

## 2025-02-04 VITALS
RESPIRATION RATE: 18 BRPM | HEART RATE: 58 BPM | WEIGHT: 179 LBS | SYSTOLIC BLOOD PRESSURE: 135 MMHG | BODY MASS INDEX: 32.94 KG/M2 | TEMPERATURE: 97 F | HEIGHT: 62 IN | DIASTOLIC BLOOD PRESSURE: 64 MMHG

## 2025-02-04 DIAGNOSIS — L97.522 SKIN ULCER OF LEFT GREAT TOE WITH FAT LAYER EXPOSED (HCC): Chronic | ICD-10-CM

## 2025-02-04 DIAGNOSIS — E11.42 DIABETIC POLYNEUROPATHY ASSOCIATED WITH TYPE 2 DIABETES MELLITUS (HCC): Chronic | ICD-10-CM

## 2025-02-04 DIAGNOSIS — L97.512 SKIN ULCER OF THIRD TOE OF RIGHT FOOT WITH FAT LAYER EXPOSED (HCC): Chronic | ICD-10-CM

## 2025-02-04 DIAGNOSIS — E11.621 TYPE 2 DIABETES MELLITUS WITH FOOT ULCER, WITHOUT LONG-TERM CURRENT USE OF INSULIN (HCC): Primary | Chronic | ICD-10-CM

## 2025-02-04 DIAGNOSIS — L97.509 TYPE 2 DIABETES MELLITUS WITH FOOT ULCER, WITHOUT LONG-TERM CURRENT USE OF INSULIN (HCC): Primary | Chronic | ICD-10-CM

## 2025-02-04 PROCEDURE — 11042 DBRDMT SUBQ TIS 1ST 20SQCM/<: CPT

## 2025-02-04 RX ORDER — TRIAMCINOLONE ACETONIDE 1 MG/G
OINTMENT TOPICAL ONCE
OUTPATIENT
Start: 2025-02-04 | End: 2025-02-04

## 2025-02-04 RX ORDER — NEOMYCIN/BACITRACIN/POLYMYXINB 3.5-400-5K
OINTMENT (GRAM) TOPICAL ONCE
OUTPATIENT
Start: 2025-02-04 | End: 2025-02-04

## 2025-02-04 RX ORDER — CLOBETASOL PROPIONATE 0.5 MG/G
OINTMENT TOPICAL ONCE
OUTPATIENT
Start: 2025-02-04 | End: 2025-02-04

## 2025-02-04 RX ORDER — SODIUM CHLOR/HYPOCHLOROUS ACID 0.033 %
SOLUTION, IRRIGATION IRRIGATION ONCE
OUTPATIENT
Start: 2025-02-04 | End: 2025-02-04

## 2025-02-04 RX ORDER — MUPIROCIN 20 MG/G
OINTMENT TOPICAL ONCE
OUTPATIENT
Start: 2025-02-04 | End: 2025-02-04

## 2025-02-04 RX ORDER — BETAMETHASONE DIPROPIONATE 0.5 MG/G
CREAM TOPICAL ONCE
OUTPATIENT
Start: 2025-02-04 | End: 2025-02-04

## 2025-02-04 RX ORDER — BACITRACIN ZINC AND POLYMYXIN B SULFATE 500; 1000 [USP'U]/G; [USP'U]/G
OINTMENT TOPICAL ONCE
OUTPATIENT
Start: 2025-02-04 | End: 2025-02-04

## 2025-02-04 RX ORDER — GENTAMICIN SULFATE 1 MG/G
OINTMENT TOPICAL ONCE
OUTPATIENT
Start: 2025-02-04 | End: 2025-02-04

## 2025-02-04 RX ORDER — SILVER SULFADIAZINE 10 MG/G
CREAM TOPICAL ONCE
OUTPATIENT
Start: 2025-02-04 | End: 2025-02-04

## 2025-02-04 RX ORDER — DOXYCYCLINE HYCLATE 100 MG
100 TABLET ORAL 2 TIMES DAILY
Qty: 28 TABLET | Refills: 0 | Status: SHIPPED | OUTPATIENT
Start: 2025-02-07 | End: 2025-02-21

## 2025-02-04 RX ORDER — SODIUM CHLOR/HYPOCHLOROUS ACID 0.033 %
SOLUTION, IRRIGATION IRRIGATION ONCE
Status: COMPLETED | OUTPATIENT
Start: 2025-02-04 | End: 2025-02-04

## 2025-02-04 RX ORDER — GINSENG 100 MG
CAPSULE ORAL ONCE
OUTPATIENT
Start: 2025-02-04 | End: 2025-02-04

## 2025-02-04 RX ADMIN — Medication: at 14:43

## 2025-02-04 NOTE — FLOWSHEET NOTE
02/04/25 1415   Right Leg Edema Point of Measurement   Leg circumference 32 cm   Ankle circumference 22 cm   Foot circumference 21 cm   Compression Therapy Tubular elastic support bandage   Left Leg Edema Point of Measurement   Leg circumference 33 cm   Ankle circumference 21 cm   Foot circumference 23 cm   Compression Therapy Tubular elastic support bandage   Wound 01/14/25 Toe (Comment  which one) Right #1 3rd toe   Date First Assessed/Time First Assessed: 01/14/25 1038   Present on Original Admission: Yes  Wound Approximate Age at First Assessment (Weeks): 50 weeks  Primary Wound Type: Diabetic Ulcer  Location: (c) Toe (Comment  which one)  Wound Location [place]...   Wound Image    Wound Etiology Diabetic Mota 2   Dressing Status Old drainage noted   Wound Cleansed Cleansed with saline   Dressing/Treatment Collagen;Hydrofera blue   Offloading for Diabetic Foot Ulcers Offloading ordered;Diabetic shoes/inserts   Wound Length (cm) 0.7 cm   Wound Width (cm) 0.5 cm   Wound Depth (cm) 0.3 cm   Wound Surface Area (cm^2) 0.35 cm^2   Change in Wound Size % (l*w) -288.89   Wound Volume (cm^3) 0.105 cm^3   Wound Healing % -289   Post-Procedure Length (cm) 0.5 cm   Post-Procedure Width (cm) 0.4 cm   Post-Procedure Depth (cm) 0.2 cm   Post-Procedure Surface Area (cm^2) 0.2 cm^2   Post-Procedure Volume (cm^3) 0.04 cm^3   Wound Assessment Pink/red   Drainage Amount Small (< 25%)   Drainage Description Sanguinous   Odor None   Jennifer-wound Assessment Hyperkeratosis (callous)   Margins Undefined edges   Wound Thickness Description not for Pressure Injury Full thickness   Wound 01/14/25 Toe (Comment  which one) Left #2 Left Great toe   Date First Assessed/Time First Assessed: 01/14/25 1038   Present on Original Admission: Yes  Wound Approximate Age at First Assessment (Weeks): 50 weeks  Primary Wound Type: Diabetic Ulcer  Location: (c) Toe (Comment  which one)  Wound Location [place]...   Wound Image    Wound Etiology Diabetic

## 2025-02-04 NOTE — ASSESSMENT & PLAN NOTE
Assessment  Distal tip right 3rd toe - wound is small with callus to periwound, toe remains edematous and erythematous  MRI is scheduled for 2/24/25  Plan  Excisional debridement to remove devitalized tissue and promote wound healing  Wound care: Vashe cleanse, collagen AG, change 3x weekly  Offload with custom shoe  RTC 1 week

## 2025-02-04 NOTE — ASSESSMENT & PLAN NOTE
Assessment  Left great toe wound; wound depth is near to bone, periwound is heavy hemorrhagic callus  Toe is mildly edematous and erythematous  MRI is scheduled for 2/24/25  Plan  Excisional debridement to remove devitalized tissue and promote wound healing  Doxycyline 100 mg BID extended for 14 days  Wound care: Vashe cleanse, collagen AG, hydrofera blue; change 3x weekly  Offload with Darco; consider TCC in future  RTC 1 week

## 2025-02-04 NOTE — WOUND CARE
Discharge Instructions for  [facility]  [address]  [address]  [place] [address]  Phone [phone]   Fax [phone]      NAME:  Jacqueline Tay  YOB: 1950  MEDICAL RECORD NUMBER:  [id number]  DATE:  1/21/2025    Return Appointment:   1 week with Orquidea Cabral NP    Instructions:     Left Great toe and right 3rd toe:  Cleanse with normal saline  Allow Vashe Wound Solution moistened gauze to sit on wound bed for 60 seconds  Apply Collagen to wound bed  Cover with Hydrofera blue ready  Secure with rolled gauze and tape  Change 3 times a week    Wear tubigrip from knees to toes on bilateral legs.     MRI scheduled for Jan. 24th.   Silver Nitrate used this visit.    Additional antibiotics sent to pharmacy this visit. Please  and continue taking as prescribed.     Patient to offload wound with DARCO SHOE WITH PEG ASSIST INSERT while standing or weight bearing.    Elevate legs when sitting.  Avoid prolonged standing or sitting with legs in dependent position.  Increase protein intake to promote wound healing. Brandon and Ensure are examples of protein supplements.  Wound healing is greatly slowed when glucose levels are greater than 200. Monitor glucose levels to ensure tight glucose control.    Protein:   Egg ~ 6g  Yogurt ~ 15g  Half cup of cottage cheese ~ 15g  Chicken breast ~ 30g  [place] filet ~ 40g     Should you experience increased redness, swelling, pain, foul odor, size of wound(s), or have a temperature over 101 degrees please contact the Wound Healing Center at [phone] or if after hours contact your primary care physician or go to the hospital emergency department.    PLEASE NOTE: IF YOU ARE UNABLE TO OBTAIN WOUND SUPPLIES, CONTINUE TO USE THE SUPPLIES YOU HAVE AVAILABLE UNTIL YOU ARE ABLE TO REACH US. IT IS MOST IMPORTANT TO KEEP THE WOUND COVERED AT ALL TIMES.    Electronically signed Josiane Guan RN on 1/21/2025 at 12:06 PM

## 2025-02-04 NOTE — PROGRESS NOTES
[facility]  Procedure Note    Jacqueline Tay  MEDICAL RECORD NUMBER: [id number]  AGE: 74 y.o.     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)     Assessment  Left great toe wound; wound depth is near to bone, periwound is heavy hemorrhagic callus  Toe is mildly edematous and erythematous  MRI is scheduled for 25  Plan  Excisional debridement to remove devitalized tissue and promote wound healing  Doxycyline 100 mg BID extended for 14 days  Wound care: Vashe cleanse, collagen AG, hydrofera blue; change 3x weekly  Offload with Darco; consider TCC in future  RTC 1 week         Skin ulcer of third toe of right foot with fat layer 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  Performed by: DORON Patrick NP  Consent obtained: Yes  Time out taken: Yes  Pain control:     Wound #: 1 and 2  Diabetic/pressure/chronic non-pressure ulcers only: diabetic ulcer, depth to bone was/were debrided using curette. The wound(s) was/were debrided down through/to subcutaneous tissue. Devitalized tissue debrided: biofilm,

## 2025-02-05 ENCOUNTER — TELEPHONE (OUTPATIENT)
Age: 75
End: 2025-02-05

## 2025-02-05 DIAGNOSIS — G47.33 OSA (OBSTRUCTIVE SLEEP APNEA): Primary | ICD-10-CM

## 2025-02-05 NOTE — TELEPHONE ENCOUNTER
Mindy with the [facility] called stating she has the following request :    Pt has had 2 home sleep studies  Both failures due to not recording time to read  Orders are needed for an in Lab Sleep Study if approved    Please call and advise.

## 2025-02-12 ENCOUNTER — HOSPITAL ENCOUNTER (OUTPATIENT)
Dept: WOUND CARE | Age: 75
Discharge: HOME | End: 2025-02-12
Payer: MEDICARE

## 2025-02-12 VITALS
BODY MASS INDEX: 32.57 KG/M2 | TEMPERATURE: 97.7 F | WEIGHT: 177 LBS | RESPIRATION RATE: 16 BRPM | SYSTOLIC BLOOD PRESSURE: 117 MMHG | DIASTOLIC BLOOD PRESSURE: 66 MMHG | HEART RATE: 53 BPM | HEIGHT: 62 IN

## 2025-02-12 DIAGNOSIS — L97.509 TYPE 2 DIABETES MELLITUS WITH FOOT ULCER, WITHOUT LONG-TERM CURRENT USE OF INSULIN (HCC): Primary | Chronic | ICD-10-CM

## 2025-02-12 DIAGNOSIS — E11.42 DIABETIC POLYNEUROPATHY ASSOCIATED WITH TYPE 2 DIABETES MELLITUS (HCC): Chronic | ICD-10-CM

## 2025-02-12 DIAGNOSIS — L97.522 SKIN ULCER OF LEFT GREAT TOE WITH FAT LAYER EXPOSED (HCC): Chronic | ICD-10-CM

## 2025-02-12 DIAGNOSIS — L97.512 SKIN ULCER OF THIRD TOE OF RIGHT FOOT WITH FAT LAYER EXPOSED (HCC): Chronic | ICD-10-CM

## 2025-02-12 DIAGNOSIS — E11.621 TYPE 2 DIABETES MELLITUS WITH FOOT ULCER, WITHOUT LONG-TERM CURRENT USE OF INSULIN (HCC): Primary | Chronic | ICD-10-CM

## 2025-02-12 PROCEDURE — 11042 DBRDMT SUBQ TIS 1ST 20SQCM/<: CPT

## 2025-02-12 RX ORDER — DOXYCYCLINE HYCLATE 100 MG
100 TABLET ORAL 2 TIMES DAILY
Qty: 20 TABLET | Refills: 0 | Status: SHIPPED | OUTPATIENT
Start: 2025-02-21 | End: 2025-03-03

## 2025-02-12 RX ORDER — SILVER SULFADIAZINE 10 MG/G
CREAM TOPICAL ONCE
OUTPATIENT
Start: 2025-02-12 | End: 2025-02-12

## 2025-02-12 RX ORDER — NEOMYCIN/BACITRACIN/POLYMYXINB 3.5-400-5K
OINTMENT (GRAM) TOPICAL ONCE
OUTPATIENT
Start: 2025-02-12 | End: 2025-02-12

## 2025-02-12 RX ORDER — BETAMETHASONE DIPROPIONATE 0.5 MG/G
CREAM TOPICAL ONCE
OUTPATIENT
Start: 2025-02-12 | End: 2025-02-12

## 2025-02-12 RX ORDER — BACITRACIN ZINC AND POLYMYXIN B SULFATE 500; 1000 [USP'U]/G; [USP'U]/G
OINTMENT TOPICAL ONCE
OUTPATIENT
Start: 2025-02-12 | End: 2025-02-12

## 2025-02-12 RX ORDER — MUPIROCIN 20 MG/G
OINTMENT TOPICAL ONCE
OUTPATIENT
Start: 2025-02-12 | End: 2025-02-12

## 2025-02-12 RX ORDER — GINSENG 100 MG
CAPSULE ORAL ONCE
OUTPATIENT
Start: 2025-02-12 | End: 2025-02-12

## 2025-02-12 RX ORDER — SODIUM CHLOR/HYPOCHLOROUS ACID 0.033 %
SOLUTION, IRRIGATION IRRIGATION ONCE
OUTPATIENT
Start: 2025-02-12 | End: 2025-02-12

## 2025-02-12 RX ORDER — SODIUM CHLOR/HYPOCHLOROUS ACID 0.033 %
SOLUTION, IRRIGATION IRRIGATION ONCE
Status: COMPLETED | OUTPATIENT
Start: 2025-02-12 | End: 2025-02-12

## 2025-02-12 RX ORDER — GENTAMICIN SULFATE 1 MG/G
OINTMENT TOPICAL ONCE
OUTPATIENT
Start: 2025-02-12 | End: 2025-02-12

## 2025-02-12 RX ORDER — TRIAMCINOLONE ACETONIDE 1 MG/G
OINTMENT TOPICAL ONCE
OUTPATIENT
Start: 2025-02-12 | End: 2025-02-12

## 2025-02-12 RX ORDER — CLOBETASOL PROPIONATE 0.5 MG/G
OINTMENT TOPICAL ONCE
OUTPATIENT
Start: 2025-02-12 | End: 2025-02-12

## 2025-02-12 RX ADMIN — Medication: at 14:57

## 2025-02-12 NOTE — WOUND CARE
Discharge Instructions for  [facility]  [address]  [address] [place] [address]  Phone [phone]   Fax [phone]      NAME:  Jacqueline Tay  YOB: 1950  MEDICAL RECORD NUMBER:  [id number]  DATE:  1/21/2025    Return Appointment:   2 weeks with Orquidea Cabral NP    Instructions:     Left Great toe and right 3rd toe:  Cleanse with normal saline  Allow Vashe Wound Solution moistened gauze to sit on wound bed for 60 seconds  Apply Hydrofera Blue to wound bed  Secure with rolled gauze and tape  Change 3 times a week    Wear tubigrip from knees to toes on bilateral legs.     MRI scheduled for Jan. 24th.   Silver Nitrate used this visit.    Additional antibiotics sent to pharmacy this visit. Please  and continue taking as prescribed.     Patient to offload wound with DARCO SHOE WITH PEG ASSIST INSERT while standing or weight bearing.    Elevate legs when sitting.  Avoid prolonged standing or sitting with legs in dependent position.  Increase protein intake to promote wound healing. Brandon and Ensure are examples of protein supplements.  Wound healing is greatly slowed when glucose levels are greater than 200. Monitor glucose levels to ensure tight glucose control.    Protein:   Egg ~ 6g  Yogurt ~ 15g  Half cup of cottage cheese ~ 15g  Chicken breast ~ 30g  [place] filet ~ 40g     Should you experience increased redness, swelling, pain, foul odor, size of wound(s), or have a temperature over 101 degrees please contact the Wound Healing Center at [phone] or if after hours contact your primary care physician or go to the hospital emergency department.    PLEASE NOTE: IF YOU ARE UNABLE TO OBTAIN WOUND SUPPLIES, CONTINUE TO USE THE SUPPLIES YOU HAVE AVAILABLE UNTIL YOU ARE ABLE TO REACH US. IT IS MOST IMPORTANT TO KEEP THE WOUND COVERED AT ALL TIMES.    Electronically signed Josiane Guan RN on 1/21/2025 at 12:06 PM

## 2025-02-12 NOTE — DISCHARGE INSTRUCTIONS
Return Appointment:   2 weeks with Orquidea Cabral NP     Instructions:      Left Great toe and right 3rd toe:  Cleanse with normal saline  Allow Vashe Wound Solution moistened gauze to sit on wound bed for 60 seconds  Apply Hydrofera Blue to wound bed  Secure with rolled gauze and tape  Change 3 times a week     Wear tubigrip from knees to toes on bilateral legs.      MRI scheduled for Jan. 24th.   Silver Nitrate used this visit.     Additional antibiotics sent to pharmacy this visit. Please  and continue taking as prescribed.      Patient to offload wound with DARCO SHOE WITH PEG ASSIST INSERT while standing or weight bearing.     Elevate legs when sitting.  Avoid prolonged standing or sitting with legs in dependent position.  Increase protein intake to promote wound healing. Brandon and Ensure are examples of protein supplements.  Wound healing is greatly slowed when glucose levels are greater than 200. Monitor glucose levels to ensure tight glucose control.     Protein:   Egg ~ 6g  Yogurt ~ 15g  Half cup of cottage cheese ~ 15g  Chicken breast ~ 30g  [place] filet ~ 40g      Should you experience increased redness, swelling, pain, foul odor, size of wound(s), or have a temperature over 101 degrees please contact the Wound Healing Center at [phone] or if after hours contact your primary care physician or go to the hospital emergency department.     PLEASE NOTE: IF YOU ARE UNABLE TO OBTAIN WOUND SUPPLIES, CONTINUE TO USE THE SUPPLIES YOU HAVE AVAILABLE UNTIL YOU ARE ABLE TO REACH US. IT IS MOST IMPORTANT TO KEEP THE WOUND COVERED AT ALL TIMES.

## 2025-02-13 PROBLEM — E11.40 TYPE 2 DIABETES MELLITUS WITH DIABETIC NEUROPATHY (HCC): Chronic | Status: ACTIVE | Noted: 2022-12-02

## 2025-02-13 NOTE — PROGRESS NOTES
[facility]  Procedure Note    Jacqueline Tay  MEDICAL RECORD NUMBER: [id number]  AGE: 74 y.o.     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)     Assessment  Left great toe wound unchanged - wound depth is near to bone, periwound is heavy hemorrhagic callus  Toe is mildly edematous and erythematous  MRI is scheduled for 25  Plan  Excisional debridement to remove devitalized tissue and promote wound healing  Doxycyline 100 mg BID extended for 14 days  Wound care: Vashe cleanse, collagen AG, hydrofera blue; change 3x weekly  Offload with Darco; consider TCC in future  RTC 1 week         Skin ulcer of 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  Performed by: DORON Patrick NP  Consent obtained: Yes  Time out taken: Yes  Pain control:     Wound #: 1 and 2  Diabetic/pressure/chronic non-pressure ulcers only: diabetic ulcer, fat layer exposed was/were debrided using curette and tissue nippers. The wound(s) was/were debrided down through/to subcutaneous tissue. Devitalized tissue debrided: biofilm,

## 2025-02-13 NOTE — ASSESSMENT & PLAN NOTE
Assessment  Distal tip right 3rd toe is unchanged - wound is small with callus to periwound, toe remains edematous and erythematous  MRI is scheduled for 2/24/25  Plan  Excisional debridement to remove devitalized tissue and promote wound healing  Wound care: Vashe cleanse, collagen AG, change 3x weekly  Offload with custom shoe  RTC 1 week

## 2025-02-13 NOTE — ASSESSMENT & PLAN NOTE
Assessment  Left great toe wound unchanged - wound depth is near to bone, periwound is heavy hemorrhagic callus  Toe is mildly edematous and erythematous  MRI is scheduled for 2/24/25  Plan  Excisional debridement to remove devitalized tissue and promote wound healing  Doxycyline 100 mg BID extended for 14 days  Wound care: Vashe cleanse, collagen AG, hydrofera blue; change 3x weekly  Offload with Darco; consider TCC in future  RTC 1 week

## 2025-02-20 ENCOUNTER — HOSPITAL ENCOUNTER (OUTPATIENT)
Dept: SLEEP CENTER | Age: 75
Discharge: HOME | End: 2025-02-23
Payer: MEDICARE

## 2025-02-20 PROCEDURE — 95811 POLYSOM 6/>YRS CPAP 4/> PARM: CPT

## 2025-02-24 ENCOUNTER — HOSPITAL ENCOUNTER (OUTPATIENT)
Dept: MRI IMAGING | Age: 75
Discharge: HOME | End: 2025-02-27
Payer: MEDICARE

## 2025-02-24 ENCOUNTER — TELEPHONE (OUTPATIENT)
Dept: SLEEP MEDICINE | Age: 75
End: 2025-02-24

## 2025-02-24 DIAGNOSIS — E11.42 DIABETIC POLYNEUROPATHY ASSOCIATED WITH TYPE 2 DIABETES MELLITUS (HCC): Chronic | ICD-10-CM

## 2025-02-24 DIAGNOSIS — L97.522 SKIN ULCER OF LEFT GREAT TOE WITH FAT LAYER EXPOSED (HCC): Chronic | ICD-10-CM

## 2025-02-24 DIAGNOSIS — L97.512 SKIN ULCER OF THIRD TOE OF RIGHT FOOT WITH FAT LAYER EXPOSED (HCC): Chronic | ICD-10-CM

## 2025-02-24 DIAGNOSIS — E11.621 TYPE 2 DIABETES MELLITUS WITH FOOT ULCER, WITHOUT LONG-TERM CURRENT USE OF INSULIN (HCC): Chronic | ICD-10-CM

## 2025-02-24 DIAGNOSIS — L97.509 TYPE 2 DIABETES MELLITUS WITH FOOT ULCER, WITHOUT LONG-TERM CURRENT USE OF INSULIN (HCC): Chronic | ICD-10-CM

## 2025-02-24 PROCEDURE — 6360000004 HC RX CONTRAST MEDICATION

## 2025-02-24 PROCEDURE — A9579 GAD-BASE MR CONTRAST NOS,1ML: HCPCS

## 2025-02-24 PROCEDURE — 73720 MRI LWR EXTREMITY W/O&W/DYE: CPT

## 2025-02-24 RX ADMIN — GADOTERIDOL 17 ML: 279.3 INJECTION, SOLUTION INTRAVENOUS at 12:28

## 2025-02-25 ENCOUNTER — TELEPHONE (OUTPATIENT)
Age: 75
End: 2025-02-25

## 2025-02-25 ENCOUNTER — OFFICE VISIT (OUTPATIENT)
Dept: SLEEP MEDICINE | Age: 75
End: 2025-02-25
Payer: MEDICARE

## 2025-02-25 VITALS
OXYGEN SATURATION: 95 % | WEIGHT: 180.4 LBS | HEART RATE: 56 BPM | BODY MASS INDEX: 33.2 KG/M2 | TEMPERATURE: 97.7 F | RESPIRATION RATE: 16 BRPM | DIASTOLIC BLOOD PRESSURE: 68 MMHG | HEIGHT: 62 IN | SYSTOLIC BLOOD PRESSURE: 142 MMHG

## 2025-02-25 DIAGNOSIS — G47.33 OSA (OBSTRUCTIVE SLEEP APNEA): Primary | ICD-10-CM

## 2025-02-25 DIAGNOSIS — G47.10 HYPERSOMNIA: ICD-10-CM

## 2025-02-25 DIAGNOSIS — G47.34 NOCTURNAL HYPOXEMIA: ICD-10-CM

## 2025-02-25 PROCEDURE — 3077F SYST BP >= 140 MM HG: CPT | Performed by: NURSE PRACTITIONER

## 2025-02-25 PROCEDURE — 99213 OFFICE O/P EST LOW 20 MIN: CPT | Performed by: NURSE PRACTITIONER

## 2025-02-25 PROCEDURE — 3017F COLORECTAL CA SCREEN DOC REV: CPT | Performed by: NURSE PRACTITIONER

## 2025-02-25 PROCEDURE — 1159F MED LIST DOCD IN RCRD: CPT | Performed by: NURSE PRACTITIONER

## 2025-02-25 PROCEDURE — 1123F ACP DISCUSS/DSCN MKR DOCD: CPT | Performed by: NURSE PRACTITIONER

## 2025-02-25 PROCEDURE — G8417 CALC BMI ABV UP PARAM F/U: HCPCS | Performed by: NURSE PRACTITIONER

## 2025-02-25 PROCEDURE — 1036F TOBACCO NON-USER: CPT | Performed by: NURSE PRACTITIONER

## 2025-02-25 PROCEDURE — 1160F RVW MEDS BY RX/DR IN RCRD: CPT | Performed by: NURSE PRACTITIONER

## 2025-02-25 PROCEDURE — 3078F DIAST BP <80 MM HG: CPT | Performed by: NURSE PRACTITIONER

## 2025-02-25 PROCEDURE — G8399 PT W/DXA RESULTS DOCUMENT: HCPCS | Performed by: NURSE PRACTITIONER

## 2025-02-25 PROCEDURE — 1090F PRES/ABSN URINE INCON ASSESS: CPT | Performed by: NURSE PRACTITIONER

## 2025-02-25 PROCEDURE — G8427 DOCREV CUR MEDS BY ELIG CLIN: HCPCS | Performed by: NURSE PRACTITIONER

## 2025-02-25 ASSESSMENT — SLEEP AND FATIGUE QUESTIONNAIRES
ESS TOTAL SCORE: 12
HOW LIKELY ARE YOU TO NOD OFF OR FALL ASLEEP WHILE SITTING AND READING: HIGH CHANCE OF DOZING
HOW LIKELY ARE YOU TO NOD OFF OR FALL ASLEEP WHEN YOU ARE A PASSENGER IN A CAR FOR AN HOUR WITHOUT A BREAK: MODERATE CHANCE OF DOZING
HOW LIKELY ARE YOU TO NOD OFF OR FALL ASLEEP WHILE WATCHING TV: MODERATE CHANCE OF DOZING
HOW LIKELY ARE YOU TO NOD OFF OR FALL ASLEEP WHILE SITTING INACTIVE IN A PUBLIC PLACE: WOULD NEVER DOZE
HOW LIKELY ARE YOU TO NOD OFF OR FALL ASLEEP WHILE SITTING QUIETLY AFTER LUNCH WITHOUT ALCOHOL: MODERATE CHANCE OF DOZING
HOW LIKELY ARE YOU TO NOD OFF OR FALL ASLEEP WHILE LYING DOWN TO REST IN THE AFTERNOON WHEN CIRCUMSTANCES PERMIT: HIGH CHANCE OF DOZING
HOW LIKELY ARE YOU TO NOD OFF OR FALL ASLEEP IN A CAR, WHILE STOPPED FOR A FEW MINUTES IN TRAFFIC: WOULD NEVER DOZE
HOW LIKELY ARE YOU TO NOD OFF OR FALL ASLEEP WHILE SITTING AND TALKING TO SOMEONE: WOULD NEVER DOZE

## 2025-02-25 NOTE — TELEPHONE ENCOUNTER
MEDICATION REFILL REQUEST          Name of Medication:   apixaban (ELIQUIS)  Dose:  5 MG TABS  Frequency:   Take 1 tablet by mouth 2 times daily  Quantity:    Days' supply:            Pharmacy Name/Location: Fax [phone]  [facility] [id number]  [facility]

## 2025-02-25 NOTE — TELEPHONE ENCOUNTER
Eliquis prescription faxed to [facility]. F[phone] [id number]

## 2025-02-25 NOTE — PROGRESS NOTES
to her most recent sleep study.  She does not take any sleep aids.    Split-night PSG 2/20/2025 with AHI 48.8 events per hour including 49 obstructive apneas, 49 hypopneas.  No stage III or REM sleep noted during the diagnostic portion of the study.  Lowest oxygen saturation was 86% with SpO2 less than 89% for a total of 1.1 minutes of the test.  She had a periodic limb movement index of 41.3 with PLM arousal index of 6.5.  She then was placed on CPAP therapy and titrated to a pressure of 10 cm which improved sleep disordered breathing and oxygenation.  She used a fullface mask the night of the study and felt that it was comfortable.  She states that she felt more rested the next day after she woke up sleeping on CPAP.  She is ready to receive a new machine and will start the pressure on AutoPap 10 to 12 cm given no REM sleep noted during the titration portion of the study.             2/25/2025     1:31 PM   Sleep Medicine   Sitting and reading 3   Watching TV 2   Sitting, inactive in a public place (e.g. a theatre or a meeting) 0   As a passenger in a car for an hour without a break 2   Lying down to rest in the afternoon when circumstances permit 3   Sitting and talking to someone 0   Sitting quietly after a lunch without alcohol 2   In a car, while stopped for a few minutes in traffic 0   [place] Sleepiness Score 12     Sleepiness Scale:     Split Night- 2/20/25 AHI- 48.8 Lowest SaO2- 86%        PSG 10/18/2012            2/25/2025     1:31 PM   Sleep Medicine   Sitting and reading 3   Watching TV 2   Sitting, inactive in a public place (e.g. a theatre or a meeting) 0   As a passenger in a car for an hour without a break 2   Lying down to rest in the afternoon when circumstances permit 3   Sitting and talking to someone 0   Sitting quietly after a lunch without alcohol 2   In a car, while stopped for a few minutes in traffic 0   [place] Sleepiness Score 12         Past Medical History:   Diagnosis Date

## 2025-02-27 RX ORDER — CIPROFLOXACIN 750 MG/1
750 TABLET, FILM COATED ORAL 2 TIMES DAILY
Qty: 60 TABLET | Refills: 0 | Status: SHIPPED | OUTPATIENT
Start: 2025-02-27 | End: 2025-03-29

## 2025-02-27 RX ORDER — DOXYCYCLINE HYCLATE 100 MG
100 TABLET ORAL 2 TIMES DAILY
Qty: 60 TABLET | Refills: 0 | Status: SHIPPED | OUTPATIENT
Start: 2025-02-27 | End: 2025-03-29

## 2025-02-27 NOTE — RESULT ENCOUNTER NOTE
Please advise MRI indicates OM in both left great toe and right 3rd toe. Will need to extend Doxycycline and add Cipro. Would like to consider HBOT but we can discuss that at next visit. Thanks.

## 2025-02-28 ENCOUNTER — TELEPHONE (OUTPATIENT)
Dept: WOUND CARE | Age: 75
End: 2025-02-28

## 2025-02-28 NOTE — TELEPHONE ENCOUNTER
Spoke with patient and relayed following message:   Please advise MRI indicates OM in both left great toe and right 3rd toe. Will need to extend Doxycycline and add Cipro. Would like to consider HBOT but we can discuss that at next visit.     Patient verbalized understanding.

## 2025-03-03 ENCOUNTER — TELEPHONE (OUTPATIENT)
Dept: WOUND CARE | Age: 75
End: 2025-03-03

## 2025-03-03 DIAGNOSIS — L97.522 SKIN ULCER OF LEFT GREAT TOE WITH FAT LAYER EXPOSED (HCC): Primary | ICD-10-CM

## 2025-03-03 NOTE — TELEPHONE ENCOUNTER
----- Message from KLAUDIA PAK RN sent at 3/3/2025  3:25 PM EST -----  Regarding: RE: Labs  I called and told her to get them drawn, she would like you to call her if you have time, when I asked if there was a message I could relay or ask you she said she wanted to discuss with you what you were thinking because she did not want to be surprised. Her number is [phone]   Thank you!  ----- Message -----  From: Orquidea Cabral APRN - NP  Sent: 3/3/2025   1:42 PM EST  To: [facility]  Subject: Labs                                             Please call and ask patient to have labs drawn before appt on Thursday. Thanks!

## 2025-03-03 NOTE — TELEPHONE ENCOUNTER
Spoke with patient about MRI results and HBOT. She is interested so we will discuss further at her appt on Thursday 3/6/25. Request she have labs drawn before appt. She is agreeable.

## 2025-03-06 ENCOUNTER — HOSPITAL ENCOUNTER (OUTPATIENT)
Dept: WOUND CARE | Age: 75
Discharge: HOME | End: 2025-03-06
Payer: MEDICARE

## 2025-03-06 VITALS
HEART RATE: 59 BPM | BODY MASS INDEX: 32.94 KG/M2 | RESPIRATION RATE: 18 BRPM | SYSTOLIC BLOOD PRESSURE: 130 MMHG | WEIGHT: 179 LBS | DIASTOLIC BLOOD PRESSURE: 57 MMHG | HEIGHT: 62 IN | TEMPERATURE: 97.5 F

## 2025-03-06 DIAGNOSIS — E11.621 DIABETIC ULCER OF TOE OF LEFT FOOT ASSOCIATED WITH TYPE 2 DIABETES MELLITUS, WITH NECROSIS OF BONE (HCC): Chronic | ICD-10-CM

## 2025-03-06 DIAGNOSIS — M86.10 ACUTE OSTEOMYELITIS (HCC): Primary | Chronic | ICD-10-CM

## 2025-03-06 DIAGNOSIS — E11.621 DIABETIC ULCER OF TOE OF RIGHT FOOT ASSOCIATED WITH TYPE 2 DIABETES MELLITUS, WITH NECROSIS OF BONE (HCC): Chronic | ICD-10-CM

## 2025-03-06 DIAGNOSIS — L97.522 SKIN ULCER OF LEFT GREAT TOE WITH FAT LAYER EXPOSED (HCC): ICD-10-CM

## 2025-03-06 DIAGNOSIS — L97.524 DIABETIC ULCER OF TOE OF LEFT FOOT ASSOCIATED WITH TYPE 2 DIABETES MELLITUS, WITH NECROSIS OF BONE (HCC): Chronic | ICD-10-CM

## 2025-03-06 DIAGNOSIS — L97.514 DIABETIC ULCER OF TOE OF RIGHT FOOT ASSOCIATED WITH TYPE 2 DIABETES MELLITUS, WITH NECROSIS OF BONE (HCC): Chronic | ICD-10-CM

## 2025-03-06 DIAGNOSIS — E11.42 DIABETIC POLYNEUROPATHY ASSOCIATED WITH TYPE 2 DIABETES MELLITUS (HCC): Chronic | ICD-10-CM

## 2025-03-06 PROBLEM — B95.5 STREPTOCOCCAL BACTEREMIA: Status: RESOLVED | Noted: 2024-07-28 | Resolved: 2025-03-06

## 2025-03-06 PROBLEM — L03.116 BILATERAL CELLULITIS OF LOWER LEG: Status: RESOLVED | Noted: 2024-07-30 | Resolved: 2025-03-06

## 2025-03-06 PROBLEM — M86.9 OSTEOMYELITIS OF GREAT TOE OF RIGHT FOOT (HCC): Status: RESOLVED | Noted: 2023-06-14 | Resolved: 2025-03-06

## 2025-03-06 PROBLEM — A41.9 SEPSIS (HCC): Status: RESOLVED | Noted: 2024-07-28 | Resolved: 2025-03-06

## 2025-03-06 PROBLEM — L97.512 SKIN ULCER OF THIRD TOE OF RIGHT FOOT WITH FAT LAYER EXPOSED (HCC): Chronic | Status: RESOLVED | Noted: 2025-01-14 | Resolved: 2025-03-06

## 2025-03-06 PROBLEM — R78.81 STREPTOCOCCAL BACTEREMIA: Status: RESOLVED | Noted: 2024-07-28 | Resolved: 2025-03-06

## 2025-03-06 PROBLEM — L03.115 BILATERAL CELLULITIS OF LOWER LEG: Status: RESOLVED | Noted: 2024-07-30 | Resolved: 2025-03-06

## 2025-03-06 PROBLEM — L97.516 DIABETIC ULCER OF TOE OF RIGHT FOOT ASSOCIATED WITH TYPE 2 DIABETES MELLITUS, WITH BONE INVOLVEMENT WITHOUT EVIDENCE OF NECROSIS (HCC): Status: ACTIVE | Noted: 2023-06-14

## 2025-03-06 PROBLEM — L03.115 CELLULITIS OF RIGHT LOWER EXTREMITY: Status: RESOLVED | Noted: 2023-06-14 | Resolved: 2025-03-06

## 2025-03-06 LAB
ERYTHROCYTE [DISTWIDTH] IN BLOOD BY AUTOMATED COUNT: 14 % (ref 11.9–14.6)
HCT VFR BLD AUTO: 44.8 % (ref 35.8–46.3)
HGB BLD-MCNC: 15 G/DL (ref 11.7–15.4)
MCH RBC QN AUTO: 30.3 PG (ref 26.1–32.9)
MCHC RBC AUTO-ENTMCNC: 33.5 G/DL (ref 31.4–35)
MCV RBC AUTO: 90.5 FL (ref 82–102)
NRBC # BLD: 0 K/UL (ref 0–0.2)
PLATELET # BLD AUTO: 59 K/UL (ref 150–450)
PMV BLD AUTO: 11 FL (ref 9.4–12.3)
RBC # BLD AUTO: 4.95 M/UL (ref 4.05–5.2)
WBC # BLD AUTO: 8.3 K/UL (ref 4.3–11.1)

## 2025-03-06 PROCEDURE — 11042 DBRDMT SUBQ TIS 1ST 20SQCM/<: CPT

## 2025-03-06 PROCEDURE — 11044 DBRDMT BONE 1ST 20 SQ CM/<: CPT

## 2025-03-06 PROCEDURE — 87075 CULTR BACTERIA EXCEPT BLOOD: CPT

## 2025-03-06 PROCEDURE — 87205 SMEAR GRAM STAIN: CPT

## 2025-03-06 PROCEDURE — 87070 CULTURE OTHR SPECIMN AEROBIC: CPT

## 2025-03-06 PROCEDURE — 87176 TISSUE HOMOGENIZATION CULTR: CPT

## 2025-03-06 NOTE — ASSESSMENT & PLAN NOTE
Assessment  Left great toe is worse with new hemorrhagic blistering, maceration, and depth to bone  Had labs drawn this morning; results are pending  MRI consistent with OM  Mota 3  Remains on Cipro and Doxy  Qualifies for HBOT  Plan  Excisional debridement to remove devitalized tissue and promote wound healing  Tissue culture taken  Continue Vashe, Hydrofera blue, change 3x weekly  Discussed HBOT and patient is agreeable  Discussed importance of managing variables such as pressure (Darco shoe), protein intake, blood sugar mgmt, antibiotics  Plan to start HBOT on Monday  Provider visit 1 week

## 2025-03-06 NOTE — FLOWSHEET NOTE
03/06/25 1100   Right Leg Edema Point of Measurement   Leg circumference 33 cm   Ankle circumference 21 cm   Foot circumference 21 cm   Compression Therapy 2 layer compression wrap   Left Leg Edema Point of Measurement   Leg circumference 34 cm   Ankle circumference 21 cm   Foot circumference 22 cm   Compression Therapy 2 layer compression wrap   Wound 01/14/25 Toe (Comment  which one) Right #1 3rd toe   Date First Assessed/Time First Assessed: 01/14/25 1038   Present on Original Admission: Yes  Wound Approximate Age at First Assessment (Weeks): 50 weeks  Primary Wound Type: Diabetic Ulcer  Location: (c) Toe (Comment  which one)  Wound Location [place]...   Wound Image     Wound Etiology Diabetic Mota 3   Dressing Status Old drainage noted   Wound Cleansed Cleansed with saline   Dressing/Treatment Collagen;Hydrofera blue   Offloading for Diabetic Foot Ulcers Offloading ordered;Diabetic shoes/inserts   Wound Length (cm) 1.5 cm   Wound Width (cm) 0.3 cm   Wound Depth (cm) 0.3 cm   Wound Surface Area (cm^2) 0.45 cm^2   Change in Wound Size % (l*w) -400   Wound Volume (cm^3) 0.135 cm^3   Wound Healing % -400   Post-Procedure Length (cm) 1.6 cm   Post-Procedure Width (cm) 0.4 cm   Post-Procedure Depth (cm) 0.3 cm   Post-Procedure Surface Area (cm^2) 0.64 cm^2   Post-Procedure Volume (cm^3) 0.192 cm^3   Wound Assessment Pink/red   Drainage Amount Moderate (25-50%)   Drainage Description Serosanguinous   Odor None   Jennifer-wound Assessment Hyperkeratosis (callous)   Margins Undefined edges   Wound Thickness Description not for Pressure Injury Full thickness   Wound 01/14/25 Toe (Comment  which one) Left #2 Left Great toe   Date First Assessed/Time First Assessed: 01/14/25 1038   Present on Original Admission: Yes  Wound Approximate Age at First Assessment (Weeks): 50 weeks  Primary Wound Type: Diabetic Ulcer  Location: (c) Toe (Comment  which one)  Wound Location [place]...   Wound Image     Wound Etiology Diabetic Mota

## 2025-03-06 NOTE — ASSESSMENT & PLAN NOTE
Assessment  Right third toe is unchanged with macerated callus to periwound  Had labs drawn this morning; results are pending  MRI consistent with OM  Mota 3  Remains on Cipro and Doxy  Qualifies for HBOT  Plan  Excisional debridement to remove devitalized tissue and promote wound healing  Tissue culture taken  Continue Vashe, Hydrofera blue, change 3x weekly  Discussed HBOT and patient is agreeable  Discussed importance of managing variables such as pressure (Darco shoe), protein intake, blood sugar mgmt, antibiotics  Plan to start HBOT on Monday  Provider visit 1 week

## 2025-03-06 NOTE — WOUND CARE
Discharge Instructions for  [facility]  [address] [place] [address]  [address] [place] [address]  Phone [phone]   Fax [phone]      NAME:  Jacqueline Tay  YOB: 1950  MEDICAL RECORD NUMBER:  [id number]  DATE:  1/21/2025    Return Appointment:   1 week with Orquidea Cabral NP    Instructions:     Left Great toe and right 3rd toe:  Cleanse with normal saline  Allow Vashe Wound Solution moistened gauze to sit on wound bed for 60 seconds  Apply Hydrofera Blue to wound bed  Secure with rolled gauze and tape  Change 3 times a week    Wear tubigrip from knees to toes on bilateral legs.      HBO therapy to start soon - awaiting lab results  Silver Nitrate used this visit.    Continue taking antibiotics as prescribed.     Patient to offload wound with DARCO SHOE WITH PEG ASSIST INSERT while standing or weight bearing.    Elevate legs when sitting.  Avoid prolonged standing or sitting with legs in dependent position.  Increase protein intake to promote wound healing. Brandon and Ensure are examples of protein supplements.  Wound healing is greatly slowed when glucose levels are greater than 200. Monitor glucose levels to ensure tight glucose control.    Protein:   Egg ~ 6g  Yogurt ~ 15g  Half cup of cottage cheese ~ 15g  Chicken breast ~ 30g  [place] filet ~ 40g     Should you experience increased redness, swelling, pain, foul odor, size of wound(s), or have a temperature over 101 degrees please contact the Wound Healing Center at [phone] or if after hours contact your primary care physician or go to the hospital emergency department.    PLEASE NOTE: IF YOU ARE UNABLE TO OBTAIN WOUND SUPPLIES, CONTINUE TO USE THE SUPPLIES YOU HAVE AVAILABLE UNTIL YOU ARE ABLE TO REACH US. IT IS MOST IMPORTANT TO KEEP THE WOUND COVERED AT ALL TIMES.    Electronically signed Josiane Guan RN on 1/21/2025 at 12:06 PM     
file prior to encounter.       LABS  HgBA1c:    Lab Results   Component Value Date/Time    LABA1C 6.5 07/28/2024 08:48 AM            Please add comments to any \"yes\" answers.    Do you have a history of: Comments   Seizure no   Congenital spherocytosis no   Optic Neuritis no   Cataracts yes - removed   Eye Surgery yes - cataract removed   Ear problems no   Ear reconstructive surgery no   Sinus problems no   Asthma no   Bronchitis no   Emphysema no   Pneumothorax no   Tuberculosis no   Other lung problems no   Hypertension yes - meds   Pacemaker/AICD no                                              Congestive heart failure no   EF% >30% yes - ok   Any implanted device no                                               Dialysis no   Current pregnancy No   Claustrophobia no   Diabetes yes - type 2    Currently using these medications:     a.  Disulfiram (Antabuse®) no    b.  Mafenide acetate        (Sulfamylon®) no    c.  Diuretics for CHF no    d.  Amiodarone dose > 400mg/day no    e.  Lanoxin/Digoxin no    f.  Current Steroid use     no   Cancer:  no    a.  Surgery for Cancer no    b.  Radiation therapy no    c.  Chemotherapy no    If yes, did you receive:     a.  Doxorubicin (Adriamycin®) No    b.  Cisplatin (Platinol AQ®) No    c.  Bleomycin (Blenoxane®) No     The above was reviewed with: Patient    Electronically signed by Hollie BHAT RN on 3/6/2025 at 12:07 PM  
  Smoking has a negative effect on treatment and may diminish the benefits you may receive.  Smoking within 2 hours prior to your treatment poses additional risk and should be avoided completely.    Drinking alcohol or using illicit drugs may also have a negative effect on your treatment and should be avoided.    Drinking carbonated beverages such as soda pop within 1 hour of your treatment could cause pains in the stomach during the treatment.  Caffeinated beverages or foods containing caffeine should be avoided before your treatment.  Please let us know if we can assist you with seeking help to stop smoking, drinking or using recreational drugs.      PROHIBITED ITEMS INFORMATION REVIEWED: Yes   No wigs, hair spray, hair oils, hair ornaments, creams, lotions, make-up, after shave, perfumes, colognes, chap stick, lip balms, mustache waxes, petroleum products (e.g. Vaseline), baby oil, deodorant or ointments  No nail polish    No synthetic clothing  No nylon hose, underwear, panty hose or bra  No food, gum or candy  No jewelry  No smoking materials such as lighter, cigarettes or matches  No reading material  No hearing aids, non-fixed dentures  No Hard (non-gas permeable) contact lenses  Most dressings are approved to wear into the hyperbaric chamber. Please advise the hyperbaric staff of any new dressings applied to your wound to ensure the new dressings are compatible with hyperbaric oxygen treatments.  No alcohol preps  No heat packs  No street clothes or shoes  No cell phones or other electronics  If it was not a part of you when you were born into this world, if it was not provided by the chamber , then it cannot go into the chamber with you.        Electronically signed by Hollie BHAT RN on 3/6/2025 at 12:01 PM

## 2025-03-07 LAB
BACTERIA SPEC CULT: NORMAL
SERVICE CMNT-IMP: NORMAL

## 2025-03-07 NOTE — PROGRESS NOTES
[facility]  Procedure Note    Jacqueline Tay  MEDICAL RECORD NUMBER: [id number]  AGE: 74 y.o.     GENDER: female    : 1950  EPISODE DATE: 3/6/2025    Problem List Items Addressed This Visit          Endocrine    * (Principal) Diabetic ulcer of toe of right foot associated with type 2 diabetes mellitus, with necrosis of bone (HCC) (Chronic)      Assessment  Right third toe is unchanged with macerated callus to periwound  Had labs drawn this morning; results are pending  MRI consistent with OM  Mota 3  Remains on Cipro and Doxy  Qualifies for HBOT  Plan  Excisional debridement to remove devitalized tissue and promote wound healing  Tissue culture taken  Continue Vashe, Hydrofera blue, change 3x weekly  Discussed HBOT and patient is agreeable  Discussed importance of managing variables such as pressure (Darco shoe), protein intake, blood sugar mgmt, antibiotics  Plan to start HBOT on Monday  Provider visit 1 week         Diabetic polyneuropathy associated with type 2 diabetes mellitus (HCC) (Chronic)    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)      Assessment  Left great toe is worse with new hemorrhagic blistering, maceration, and depth to bone  Had labs drawn this morning; results are pending  MRI consistent with OM  Mota 3  Remains on Cipro and Doxy  Qualifies for HBOT  Plan  Excisional debridement to remove devitalized tissue and promote wound healing  Tissue culture taken  Continue Vashe, Hydrofera blue, change 3x weekly  Discussed HBOT and patient is agreeable  Discussed importance of managing variables such as pressure (Darco shoe), protein intake, blood sugar mgmt, antibiotics  Plan to start HBOT on Monday  Provider visit 1 week            Other    Acute osteomyelitis (HCC) - Primary (Chronic)     Procedure Note: Excisional Debridement  Indications: Based on my examination of the patient's wound(s) today, debridement is 
progressive but usually reversible myopia, round-window or oval-window blowout during a vigorous Valsalva maneuver, ulnar nerve paresthesias, pulmonary oxygen toxicity if inter-treatment FIO2 is > 40%, gas embolism, respiratory arrest in CO2 retainers, pneumothorax, cardiac arrest, and fire & explosion. The RN Hyperbaric Oxygen Therapy Risk Assessment Tool was thoroughly reviewed with the patient/caregiver/POA as well.     The patient does NOT have medical issues that will require additional testing or consultant clearance prior to the initiation of HBOT.  The patient does NOT require premedication prior to HBOT.    HBOT treatment plan will be once daily on scheduled treatment days.  CHRISTOPHER: 2.0  Duration: 90 minutes at depth.  Air breaks: None.  Total number of treatments 40    The patient/POA verbalized understanding of the risks associated with HBOT and has agreed to the above plan.     Electronically signed by DORON Patrick NP on 3/7/2025 at 1:18 PM.

## 2025-03-08 LAB
BACTERIA SPEC CULT: ABNORMAL
BACTERIA SPEC CULT: ABNORMAL
GRAM STN SPEC: ABNORMAL
GRAM STN SPEC: ABNORMAL
SERVICE CMNT-IMP: ABNORMAL

## 2025-03-10 ENCOUNTER — HOSPITAL ENCOUNTER (OUTPATIENT)
Dept: WOUND CARE | Age: 75
Discharge: HOME | End: 2025-03-10
Payer: MEDICARE

## 2025-03-10 ENCOUNTER — RESULTS FOLLOW-UP (OUTPATIENT)
Dept: WOUND CARE | Age: 75
End: 2025-03-10

## 2025-03-10 VITALS
TEMPERATURE: 98.6 F | DIASTOLIC BLOOD PRESSURE: 72 MMHG | HEART RATE: 56 BPM | OXYGEN SATURATION: 97 % | RESPIRATION RATE: 17 BRPM | SYSTOLIC BLOOD PRESSURE: 139 MMHG

## 2025-03-10 DIAGNOSIS — L97.514 DIABETIC ULCER OF TOE OF RIGHT FOOT ASSOCIATED WITH TYPE 2 DIABETES MELLITUS, WITH NECROSIS OF BONE (HCC): ICD-10-CM

## 2025-03-10 DIAGNOSIS — D69.6 THROMBOCYTOPENIA: ICD-10-CM

## 2025-03-10 DIAGNOSIS — L97.524 DIABETIC ULCER OF TOE OF LEFT FOOT ASSOCIATED WITH TYPE 2 DIABETES MELLITUS, WITH NECROSIS OF BONE (HCC): ICD-10-CM

## 2025-03-10 DIAGNOSIS — E11.621 DIABETIC ULCER OF TOE OF RIGHT FOOT ASSOCIATED WITH TYPE 2 DIABETES MELLITUS, WITH NECROSIS OF BONE (HCC): ICD-10-CM

## 2025-03-10 DIAGNOSIS — E11.621 DIABETIC ULCER OF TOE OF LEFT FOOT ASSOCIATED WITH TYPE 2 DIABETES MELLITUS, WITH NECROSIS OF BONE (HCC): ICD-10-CM

## 2025-03-10 DIAGNOSIS — M86.10 ACUTE OSTEOMYELITIS (HCC): Primary | ICD-10-CM

## 2025-03-10 LAB
ALBUMIN SERPL-MCNC: 3.4 G/DL (ref 3.2–4.6)
ALBUMIN/GLOB SERPL: 0.9 (ref 1–1.9)
ALP SERPL-CCNC: 61 U/L (ref 35–104)
ALT SERPL-CCNC: 19 U/L (ref 8–45)
ANION GAP SERPL CALC-SCNC: 12 MMOL/L (ref 7–16)
AST SERPL-CCNC: 20 U/L (ref 15–37)
BASOPHILS # BLD: 0.06 K/UL (ref 0–0.2)
BASOPHILS NFR BLD: 0.7 % (ref 0–2)
BILIRUB SERPL-MCNC: 0.5 MG/DL (ref 0–1.2)
BUN SERPL-MCNC: 30 MG/DL (ref 8–23)
CALCIUM SERPL-MCNC: 9.3 MG/DL (ref 8.8–10.2)
CHLORIDE SERPL-SCNC: 105 MMOL/L (ref 98–107)
CO2 SERPL-SCNC: 24 MMOL/L (ref 20–29)
CREAT SERPL-MCNC: 0.7 MG/DL (ref 0.6–1.1)
CRP SERPL-MCNC: 0.4 MG/DL (ref 0–0.4)
DIFFERENTIAL METHOD BLD: ABNORMAL
EOSINOPHIL # BLD: 0.09 K/UL (ref 0–0.8)
EOSINOPHIL NFR BLD: 1 % (ref 0.5–7.8)
ERYTHROCYTE [DISTWIDTH] IN BLOOD BY AUTOMATED COUNT: 14.5 % (ref 11.9–14.6)
ERYTHROCYTE [SEDIMENTATION RATE] IN BLOOD: 18 MM/HR (ref 0–30)
GLOBULIN SER CALC-MCNC: 3.5 G/DL (ref 2.3–3.5)
GLUCOSE BLD STRIP.AUTO-MCNC: 110 MG/DL (ref 65–100)
GLUCOSE BLD STRIP.AUTO-MCNC: 121 MG/DL (ref 65–100)
GLUCOSE BLD STRIP.AUTO-MCNC: 138 MG/DL (ref 65–100)
GLUCOSE SERPL-MCNC: 113 MG/DL (ref 70–99)
HCT VFR BLD AUTO: 44.5 % (ref 35.8–46.3)
HGB BLD-MCNC: 14.7 G/DL (ref 11.7–15.4)
IMM GRANULOCYTES # BLD AUTO: 0.02 K/UL (ref 0–0.5)
IMM GRANULOCYTES NFR BLD AUTO: 0.2 % (ref 0–5)
LYMPHOCYTES # BLD: 2.33 K/UL (ref 0.5–4.6)
LYMPHOCYTES NFR BLD: 26.9 % (ref 13–44)
MCH RBC QN AUTO: 30.6 PG (ref 26.1–32.9)
MCHC RBC AUTO-ENTMCNC: 33 G/DL (ref 31.4–35)
MCV RBC AUTO: 92.5 FL (ref 82–102)
MONOCYTES # BLD: 0.6 K/UL (ref 0.1–1.3)
MONOCYTES NFR BLD: 6.9 % (ref 4–12)
NEUTS SEG # BLD: 5.56 K/UL (ref 1.7–8.2)
NEUTS SEG NFR BLD: 64.3 % (ref 43–78)
NRBC # BLD: 0 K/UL (ref 0–0.2)
PLATELET # BLD AUTO: 71 K/UL (ref 150–450)
PMV BLD AUTO: 10.4 FL (ref 9.4–12.3)
POTASSIUM SERPL-SCNC: 4 MMOL/L (ref 3.5–5.1)
PROT SERPL-MCNC: 6.9 G/DL (ref 6.3–8.2)
RBC # BLD AUTO: 4.81 M/UL (ref 4.05–5.2)
SERVICE CMNT-IMP: ABNORMAL
SODIUM SERPL-SCNC: 140 MMOL/L (ref 136–145)
WBC # BLD AUTO: 8.7 K/UL (ref 4.3–11.1)

## 2025-03-10 PROCEDURE — G0277 HBOT, FULL BODY CHAMBER, 30M: HCPCS

## 2025-03-10 PROCEDURE — 82962 GLUCOSE BLOOD TEST: CPT

## 2025-03-10 PROCEDURE — 99183 HYPERBARIC OXYGEN THERAPY: CPT | Performed by: FAMILY MEDICINE

## 2025-03-10 RX ORDER — CEPHALEXIN 500 MG/1
500 CAPSULE ORAL 4 TIMES DAILY
Qty: 180 CAPSULE | Refills: 0 | Status: SHIPPED | OUTPATIENT
Start: 2025-03-10 | End: 2025-04-24

## 2025-03-10 NOTE — RESULT ENCOUNTER NOTE
Culture grew group B strep; can stop Doxy and Cipro, will need to start Keflex. Prescription sent to pharmacy on record. Please advise. Thanks!

## 2025-03-10 NOTE — TELEPHONE ENCOUNTER
Advised patient to stop Doxy and Cipro. Start Keflex which has been sent to pharmacy. Pt verbalized understanding.

## 2025-03-11 ENCOUNTER — HOSPITAL ENCOUNTER (OUTPATIENT)
Dept: WOUND CARE | Age: 75
Discharge: HOME | End: 2025-03-11
Payer: MEDICARE

## 2025-03-11 VITALS
OXYGEN SATURATION: 100 % | SYSTOLIC BLOOD PRESSURE: 127 MMHG | DIASTOLIC BLOOD PRESSURE: 67 MMHG | HEART RATE: 57 BPM | RESPIRATION RATE: 18 BRPM | TEMPERATURE: 97.9 F

## 2025-03-11 DIAGNOSIS — L97.514 DIABETIC ULCER OF TOE OF RIGHT FOOT ASSOCIATED WITH TYPE 2 DIABETES MELLITUS, WITH NECROSIS OF BONE (HCC): ICD-10-CM

## 2025-03-11 DIAGNOSIS — L97.524 DIABETIC ULCER OF TOE OF LEFT FOOT ASSOCIATED WITH TYPE 2 DIABETES MELLITUS, WITH NECROSIS OF BONE (HCC): ICD-10-CM

## 2025-03-11 DIAGNOSIS — E11.621 DIABETIC ULCER OF TOE OF LEFT FOOT ASSOCIATED WITH TYPE 2 DIABETES MELLITUS, WITH NECROSIS OF BONE (HCC): ICD-10-CM

## 2025-03-11 DIAGNOSIS — M86.10 ACUTE OSTEOMYELITIS (HCC): Primary | ICD-10-CM

## 2025-03-11 DIAGNOSIS — E11.621 DIABETIC ULCER OF TOE OF RIGHT FOOT ASSOCIATED WITH TYPE 2 DIABETES MELLITUS, WITH NECROSIS OF BONE (HCC): ICD-10-CM

## 2025-03-11 LAB
GLUCOSE BLD STRIP.AUTO-MCNC: 118 MG/DL (ref 65–100)
GLUCOSE BLD STRIP.AUTO-MCNC: 152 MG/DL (ref 65–100)
SERVICE CMNT-IMP: ABNORMAL
SERVICE CMNT-IMP: ABNORMAL

## 2025-03-11 PROCEDURE — 82962 GLUCOSE BLOOD TEST: CPT

## 2025-03-11 PROCEDURE — G0277 HBOT, FULL BODY CHAMBER, 30M: HCPCS

## 2025-03-11 PROCEDURE — 99183 HYPERBARIC OXYGEN THERAPY: CPT | Performed by: FAMILY MEDICINE

## 2025-03-11 ASSESSMENT — PAIN SCALES - GENERAL
PAINLEVEL_OUTOF10: 0
PAINLEVEL_OUTOF10: 0

## 2025-03-11 NOTE — PROGRESS NOTES
HBO PROGRESS NOTE      NAME: Jacqueline Tay  MEDICAL RECORD NUMBER:  [id number]  AGE: 74 y.o.   GENDER: female  : 1950  EPISODE DATE:  3/10/2025     Subjective     HBO Treatment Number: 1 out of Total Treatments: 40    HBO Diagnosis:             Indications: Lower Extremity Diabetic Wound ___(site) (Left foot)    Safety checks performed prior to treatment.  See doc flowsheets for documentation.    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)    Relevant Orders    POCT glucose       Musculoskeletal and Integument    Acute osteomyelitis (HCC) - Primary (Chronic)    Relevant Orders    POCT glucose       Physical Exam        General Appearance:  alert and oriented to person, place and time, well-developed and well-nourished, in no acute distress    Pre Tympanic Membrane Assessment:  Right: Normal  Left: Normal    Post Tympanic Membrane Assessment:  Left: Normal  Right: Normal    Pulmonary/Chest:  clear to auscultation bilaterally- no wheezes, rales or rhonchi, normal air movement, no respiratory distress    Cardiovascular:  normal    Plan        Patient placed in a full body Monoplace Chamber #: [id number]  Treatment Start Time: 1122     Pressure Reached Time: 1131  CHRISTOPHER : 2  Number of Air Breaks:        Decompression Time: 1301   Treatment End Time: 1308  Length of Treatment: 90 Minutes  Symptoms Noted During Treatment: None  Total

## 2025-03-12 ENCOUNTER — HOSPITAL ENCOUNTER (OUTPATIENT)
Dept: WOUND CARE | Age: 75
Discharge: HOME | End: 2025-03-12
Payer: MEDICARE

## 2025-03-12 VITALS
SYSTOLIC BLOOD PRESSURE: 134 MMHG | RESPIRATION RATE: 18 BRPM | TEMPERATURE: 98.2 F | HEART RATE: 50 BPM | DIASTOLIC BLOOD PRESSURE: 73 MMHG | OXYGEN SATURATION: 98 %

## 2025-03-12 DIAGNOSIS — M86.10 ACUTE OSTEOMYELITIS (HCC): Primary | ICD-10-CM

## 2025-03-12 DIAGNOSIS — E11.621 DIABETIC ULCER OF TOE OF LEFT FOOT ASSOCIATED WITH TYPE 2 DIABETES MELLITUS, WITH NECROSIS OF BONE (HCC): ICD-10-CM

## 2025-03-12 DIAGNOSIS — L97.514 DIABETIC ULCER OF TOE OF RIGHT FOOT ASSOCIATED WITH TYPE 2 DIABETES MELLITUS, WITH NECROSIS OF BONE (HCC): ICD-10-CM

## 2025-03-12 DIAGNOSIS — L97.524 DIABETIC ULCER OF TOE OF LEFT FOOT ASSOCIATED WITH TYPE 2 DIABETES MELLITUS, WITH NECROSIS OF BONE (HCC): ICD-10-CM

## 2025-03-12 DIAGNOSIS — E11.621 DIABETIC ULCER OF TOE OF RIGHT FOOT ASSOCIATED WITH TYPE 2 DIABETES MELLITUS, WITH NECROSIS OF BONE (HCC): ICD-10-CM

## 2025-03-12 LAB
GLUCOSE BLD STRIP.AUTO-MCNC: 106 MG/DL (ref 65–100)
GLUCOSE BLD STRIP.AUTO-MCNC: 146 MG/DL (ref 65–100)
SERVICE CMNT-IMP: ABNORMAL
SERVICE CMNT-IMP: ABNORMAL

## 2025-03-12 PROCEDURE — 82962 GLUCOSE BLOOD TEST: CPT

## 2025-03-12 PROCEDURE — G0277 HBOT, FULL BODY CHAMBER, 30M: HCPCS

## 2025-03-12 PROCEDURE — 99183 HYPERBARIC OXYGEN THERAPY: CPT | Performed by: FAMILY MEDICINE

## 2025-03-12 ASSESSMENT — PAIN SCALES - GENERAL
PAINLEVEL_OUTOF10: 0
PAINLEVEL_OUTOF10: 0

## 2025-03-13 ENCOUNTER — HOSPITAL ENCOUNTER (OUTPATIENT)
Dept: WOUND CARE | Age: 75
Discharge: HOME | End: 2025-03-13
Payer: MEDICARE

## 2025-03-13 VITALS
RESPIRATION RATE: 18 BRPM | DIASTOLIC BLOOD PRESSURE: 71 MMHG | SYSTOLIC BLOOD PRESSURE: 147 MMHG | HEART RATE: 56 BPM | OXYGEN SATURATION: 96 % | TEMPERATURE: 97.5 F

## 2025-03-13 VITALS
DIASTOLIC BLOOD PRESSURE: 71 MMHG | OXYGEN SATURATION: 96 % | TEMPERATURE: 97.5 F | SYSTOLIC BLOOD PRESSURE: 147 MMHG | HEART RATE: 56 BPM | RESPIRATION RATE: 18 BRPM

## 2025-03-13 DIAGNOSIS — L97.514 DIABETIC ULCER OF TOE OF RIGHT FOOT ASSOCIATED WITH TYPE 2 DIABETES MELLITUS, WITH NECROSIS OF BONE (HCC): ICD-10-CM

## 2025-03-13 DIAGNOSIS — M86.10 ACUTE OSTEOMYELITIS (HCC): Primary | ICD-10-CM

## 2025-03-13 DIAGNOSIS — E11.42 DIABETIC POLYNEUROPATHY ASSOCIATED WITH TYPE 2 DIABETES MELLITUS (HCC): Chronic | ICD-10-CM

## 2025-03-13 DIAGNOSIS — E11.621 DIABETIC ULCER OF TOE OF LEFT FOOT ASSOCIATED WITH TYPE 2 DIABETES MELLITUS, WITH NECROSIS OF BONE (HCC): ICD-10-CM

## 2025-03-13 DIAGNOSIS — E11.621 DIABETIC ULCER OF TOE OF RIGHT FOOT ASSOCIATED WITH TYPE 2 DIABETES MELLITUS, WITH NECROSIS OF BONE (HCC): Primary | Chronic | ICD-10-CM

## 2025-03-13 DIAGNOSIS — L97.514 DIABETIC ULCER OF TOE OF RIGHT FOOT ASSOCIATED WITH TYPE 2 DIABETES MELLITUS, WITH NECROSIS OF BONE (HCC): Primary | Chronic | ICD-10-CM

## 2025-03-13 DIAGNOSIS — L97.524 DIABETIC ULCER OF TOE OF LEFT FOOT ASSOCIATED WITH TYPE 2 DIABETES MELLITUS, WITH NECROSIS OF BONE (HCC): Chronic | ICD-10-CM

## 2025-03-13 DIAGNOSIS — E11.621 DIABETIC ULCER OF TOE OF RIGHT FOOT ASSOCIATED WITH TYPE 2 DIABETES MELLITUS, WITH NECROSIS OF BONE (HCC): ICD-10-CM

## 2025-03-13 DIAGNOSIS — L97.524 DIABETIC ULCER OF TOE OF LEFT FOOT ASSOCIATED WITH TYPE 2 DIABETES MELLITUS, WITH NECROSIS OF BONE (HCC): ICD-10-CM

## 2025-03-13 DIAGNOSIS — E11.621 DIABETIC ULCER OF TOE OF LEFT FOOT ASSOCIATED WITH TYPE 2 DIABETES MELLITUS, WITH NECROSIS OF BONE (HCC): Chronic | ICD-10-CM

## 2025-03-13 LAB
BACTERIA SPEC CULT: NORMAL
GLUCOSE BLD STRIP.AUTO-MCNC: 110 MG/DL (ref 65–100)
GLUCOSE BLD STRIP.AUTO-MCNC: 132 MG/DL (ref 65–100)
SERVICE CMNT-IMP: ABNORMAL
SERVICE CMNT-IMP: ABNORMAL
SERVICE CMNT-IMP: NORMAL

## 2025-03-13 PROCEDURE — 99183 HYPERBARIC OXYGEN THERAPY: CPT

## 2025-03-13 PROCEDURE — 82962 GLUCOSE BLOOD TEST: CPT

## 2025-03-13 PROCEDURE — G0277 HBOT, FULL BODY CHAMBER, 30M: HCPCS

## 2025-03-13 PROCEDURE — 11042 DBRDMT SUBQ TIS 1ST 20SQCM/<: CPT

## 2025-03-13 RX ORDER — SODIUM CHLOR/HYPOCHLOROUS ACID 0.033 %
SOLUTION, IRRIGATION IRRIGATION ONCE
Status: COMPLETED | OUTPATIENT
Start: 2025-03-13 | End: 2025-03-13

## 2025-03-13 RX ORDER — TRIAMCINOLONE ACETONIDE 1 MG/G
OINTMENT TOPICAL ONCE
OUTPATIENT
Start: 2025-03-13 | End: 2025-03-13

## 2025-03-13 RX ORDER — CLOBETASOL PROPIONATE 0.5 MG/G
OINTMENT TOPICAL ONCE
OUTPATIENT
Start: 2025-03-13 | End: 2025-03-13

## 2025-03-13 RX ORDER — MUPIROCIN 20 MG/G
OINTMENT TOPICAL ONCE
OUTPATIENT
Start: 2025-03-13 | End: 2025-03-13

## 2025-03-13 RX ORDER — BETAMETHASONE DIPROPIONATE 0.5 MG/G
CREAM TOPICAL ONCE
OUTPATIENT
Start: 2025-03-13 | End: 2025-03-13

## 2025-03-13 RX ORDER — BACITRACIN ZINC AND POLYMYXIN B SULFATE 500; 1000 [USP'U]/G; [USP'U]/G
OINTMENT TOPICAL ONCE
OUTPATIENT
Start: 2025-03-13 | End: 2025-03-13

## 2025-03-13 RX ORDER — GINSENG 100 MG
CAPSULE ORAL ONCE
OUTPATIENT
Start: 2025-03-13 | End: 2025-03-13

## 2025-03-13 RX ORDER — GENTAMICIN SULFATE 1 MG/G
OINTMENT TOPICAL ONCE
OUTPATIENT
Start: 2025-03-13 | End: 2025-03-13

## 2025-03-13 RX ORDER — NEOMYCIN/BACITRACIN/POLYMYXINB 3.5-400-5K
OINTMENT (GRAM) TOPICAL ONCE
OUTPATIENT
Start: 2025-03-13 | End: 2025-03-13

## 2025-03-13 RX ORDER — SODIUM CHLOR/HYPOCHLOROUS ACID 0.033 %
SOLUTION, IRRIGATION IRRIGATION ONCE
Status: CANCELLED | OUTPATIENT
Start: 2025-03-13 | End: 2025-03-13

## 2025-03-13 RX ORDER — SILVER SULFADIAZINE 10 MG/G
CREAM TOPICAL ONCE
OUTPATIENT
Start: 2025-03-13 | End: 2025-03-13

## 2025-03-13 RX ADMIN — Medication: at 14:16

## 2025-03-13 ASSESSMENT — VISUAL ACUITY: OU: 1

## 2025-03-13 ASSESSMENT — PAIN SCALES - GENERAL: PAINLEVEL_OUTOF10: 0

## 2025-03-13 NOTE — WOUND CARE
Discharge Instructions for  [facility]  [address] [place] [address]  [address] [place] [address]  Phone [phone]   Fax [phone]      NAME:  Jacqueline Tay  YOB: 1950  MEDICAL RECORD NUMBER:  [id number]  DATE:  3/13/2025    Return Appointment:   2 weeks with Orquidea Cabrla NP  Return Appointment: Friday 3/14 & 3/17 Cast Change  with  Dr. Larry        Instructions:   Left Great toe and right 3rd toe:  Cleanse with normal saline  Allow Vashe Wound Solution moistened gauze to sit on wound bed for 60 seconds  Apply Hydrofera Blue to wound bed  Secure with rolled gauze and tape  Change 3 times a week     TCC (Cast) to be applied on left foot on Friday 3/14/25    Wear tubigrip from knees to toes on bilateral legs.      Continue HBO.  Silver Nitrate used this visit.     Continue taking antibiotics as prescribed.      Patient to offload wound with DARCO SHOE WITH PEG ASSIST INSERT while standing or weight bearing.     Elevate legs when sitting.  Avoid prolonged standing or sitting with legs in dependent position.  Increase protein intake to promote wound healing. Brandon and Ensure are examples of protein supplements.  Wound healing is greatly slowed when glucose levels are greater than 200. Monitor glucose levels to ensure tight glucose control.     Protein:   Egg ~ 6g  Yogurt ~ 15g  Half cup of cottage cheese ~ 15g  Chicken breast ~ 30g  [place] filet ~ 40g          Should you experience increased redness, swelling, pain, foul odor, size of wound(s), or have a temperature over 101 degrees please contact the Wound Healing Center at [phone] or if after hours contact your primary care physician or go to the hospital emergency department.    PLEASE NOTE: IF YOU ARE UNABLE TO OBTAIN WOUND SUPPLIES, CONTINUE TO USE THE SUPPLIES YOU HAVE AVAILABLE UNTIL YOU ARE ABLE TO REACH US. IT IS MOST IMPORTANT TO KEEP THE WOUND COVERED AT ALL TIMES.    Electronically signed Hollie BHAT,

## 2025-03-13 NOTE — PROGRESS NOTES
[facility]  Hyperbaric Oxygen Therapy Progress Note    NAME: Jacqueline Tay  MEDICAL RECORD NUMBER: [id number]  AGE: 74 y.o.     GENDER: female    : 1950  EPISODE DATE: 3/13/2025   Subjective     HBO Treatment Number: 4 out of Total Treatments: 40.    HBO Diagnosis:             Indications: Lower Extremity Diabetic Wound ___(site) (Left Foot)    Safety checks performed prior to treatment. See documentation flowsheet for details.  Objective        Recent Labs     25  1013 25  1216   POCGLU 132* 110*     Pre treatment Vital Signs       Temp: 97.5 °F (36.4 °C)     Pulse: 57     Respirations: 18     BP: (!) 151/71       Post treatment Vital Signs  Temp: 97.5 °F (36.4 °C)  Pulse: 56  Respirations: 18  BP: (!) 147/71    Physical Exam      Physical Exam  Vitals and nursing note reviewed.   Constitutional:       General: She is not in acute distress.     Appearance: Normal appearance. She is not toxic-appearing.   HENT:      Head: Normocephalic.      Right Ear: Hearing and tympanic membrane normal.      Left Ear: Hearing and tympanic membrane normal.   Eyes:      General: Vision grossly intact.   Cardiovascular:      Rate and Rhythm: Normal rate.      Pulses: Normal pulses.   Pulmonary:      Effort: Pulmonary effort is normal.      Breath sounds: Normal breath sounds.   Skin:     General: Skin is warm and dry.   Neurological:      Mental Status: She is alert. Mental status is at baseline.   Psychiatric:         Mood and Affect: Mood normal.         Behavior: Behavior normal.       Assessment        HBO Diagnosis:   Problem List Items Addressed This Visit          Endocrine    Diabetic ulcer of toe of right foot associated with type 2 diabetes mellitus, with necrosis of bone (HCC) (Chronic)    Relevant Orders    Notify physician (specify)    Hyperbaric Oxygen Therapy    Hypoglycemial protocol    POCT glucose    Care order/instruction    Care

## 2025-03-13 NOTE — FLOWSHEET NOTE
03/13/25 1347   Wound 01/14/25 Toe (Comment  which one) Right #1 3rd toe   Date First Assessed/Time First Assessed: 01/14/25 1038   Present on Original Admission: Yes  Wound Approximate Age at First Assessment (Weeks): 50 weeks  Primary Wound Type: Diabetic Ulcer  Location: (c) Toe (Comment  which one)  Wound Location [place]...   Wound Image     Wound Etiology Diabetic Mota 3   Dressing Status Old drainage noted   Wound Cleansed Cleansed with saline   Dressing/Treatment Collagen;Hydrofera blue   Offloading for Diabetic Foot Ulcers Offloading ordered   Wound Length (cm) 0.5 cm   Wound Width (cm) 0.4 cm   Wound Depth (cm) 0.4 cm   Wound Surface Area (cm^2) 0.2 cm^2   Change in Wound Size % (l*w) -122.22   Wound Volume (cm^3) 0.08 cm^3   Wound Healing % -196   Post-Procedure Length (cm) 0.6 cm   Post-Procedure Width (cm) 0.4 cm   Post-Procedure Depth (cm) 0.4 cm   Post-Procedure Surface Area (cm^2) 0.24 cm^2   Post-Procedure Volume (cm^3) 0.096 cm^3   Wound Assessment Pink/red   Drainage Amount Moderate (25-50%)   Drainage Description Serosanguinous   Odor None   Jennifer-wound Assessment Hyperkeratosis (callous)   Wound Thickness Description not for Pressure Injury Full thickness   Wound 01/14/25 Toe (Comment  which one) Left #2 Left Great toe   Date First Assessed/Time First Assessed: 01/14/25 1038   Present on Original Admission: Yes  Wound Approximate Age at First Assessment (Weeks): 50 weeks  Primary Wound Type: Diabetic Ulcer  Location: (c) Toe (Comment  which one)  Wound Location [place]...   Wound Image     Wound Etiology Diabetic Mota 3   Dressing Status Old drainage noted   Wound Cleansed Cleansed with saline   Dressing/Treatment Collagen;Hydrofera blue   Offloading for Diabetic Foot Ulcers Offloading ordered   Wound Length (cm) 1 cm   Wound Width (cm) 2 cm   Wound Depth (cm) 0.5 cm   Wound Surface Area (cm^2) 2 cm^2   Change in Wound Size % (l*w) -1233.33   Wound Volume (cm^3) 1 cm^3   Wound Healing %

## 2025-03-13 NOTE — ASSESSMENT & PLAN NOTE
Assessment  Right 3rd toe is smaller; periwound is macerated callus  She is tolerating HBOT  Plan  Excisional debridement to remove devitalized tissue and promote wound healing  Continue Vashe, Hydrofera blue, change 3x weekly  Continue HBOT and abx  RTC on Monday

## 2025-03-13 NOTE — ASSESSMENT & PLAN NOTE
Assessment  Left great toe is slightly smaller; periwound is hemorrhagic callus  Culture grew GBS; treated with Keflex  She is tolerating HBOT  Plan  Excisional debridement to remove devitalized tissue and promote wound healing  Discussed more aggressive offloading; she is agreeable to TCC  Continue Vashe, Hydrofera blue, change 3x weekly; TCC for offloading  Continue HBOT and abx  RTC on Monday for TCC change; then weekly   show

## 2025-03-13 NOTE — PROGRESS NOTES
[facility]  Procedure Note    Jacqueline Tay  MEDICAL RECORD NUMBER: [id number]  AGE: 74 y.o.     GENDER: female    : 1950  EPISODE DATE: 3/13/2025    Problem List Items Addressed This Visit          Endocrine    Diabetic ulcer of toe of right foot associated with type 2 diabetes mellitus, with necrosis of bone (HCC) - Primary (Chronic)     Assessment  Right 3rd toe is smaller; periwound is macerated callus  She is tolerating HBOT  Plan  Excisional debridement to remove devitalized tissue and promote wound healing  Continue Vashe, Hydrofera blue, change 3x weekly  Continue HBOT and abx  RTC on Monday         Relevant Orders    Initiate Outpatient Wound Care Protocol    Diabetic polyneuropathy associated with type 2 diabetes mellitus (HCC) (Chronic)    Relevant Orders    Initiate Outpatient Wound Care Protocol    * (Principal) 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)     Assessment  Left great toe is slightly smaller; periwound is hemorrhagic callus  Culture grew GBS; treated with Keflex  She is tolerating HBOT  Plan  Excisional debridement to remove devitalized tissue and promote wound healing  Discussed more aggressive offloading; she is agreeable to TCC  Continue Vashe, Hydrofera blue, change 3x weekly; TCC for offloading  Continue HBOT and abx  RTC on Monday for TCC change; then weekly          Procedure Note: Excisional Debridement  Indications: Based on my examination of the patient's wound(s) today, debridement is required to remove devitalized tissue, evaluate the wound base, and promote wound healing.  Performed by: DORON Patrick - TIFFANI  Consent obtained: Yes  Time out taken: Yes  Pain control:     Wound #: 1 and 2  Diabetic/pressure/chronic non-pressure ulcers only: diabetic ulcer, fat layer exposed was/were debrided using curette. The wound(s) was/were debrided down through/to subcutaneous tissue. Devitalized tissue

## 2025-03-14 ENCOUNTER — HOSPITAL ENCOUNTER (OUTPATIENT)
Dept: WOUND CARE | Age: 75
Discharge: HOME | End: 2025-03-14
Payer: MEDICARE

## 2025-03-14 VITALS
TEMPERATURE: 98.3 F | RESPIRATION RATE: 18 BRPM | HEART RATE: 52 BPM | OXYGEN SATURATION: 96 % | SYSTOLIC BLOOD PRESSURE: 135 MMHG | DIASTOLIC BLOOD PRESSURE: 67 MMHG

## 2025-03-14 VITALS
SYSTOLIC BLOOD PRESSURE: 135 MMHG | RESPIRATION RATE: 18 BRPM | TEMPERATURE: 98.3 F | OXYGEN SATURATION: 96 % | HEART RATE: 52 BPM | DIASTOLIC BLOOD PRESSURE: 67 MMHG

## 2025-03-14 DIAGNOSIS — L97.514 DIABETIC ULCER OF TOE OF RIGHT FOOT ASSOCIATED WITH TYPE 2 DIABETES MELLITUS, WITH NECROSIS OF BONE (HCC): ICD-10-CM

## 2025-03-14 DIAGNOSIS — E11.621 DIABETIC ULCER OF TOE OF LEFT FOOT ASSOCIATED WITH TYPE 2 DIABETES MELLITUS, WITH NECROSIS OF BONE (HCC): ICD-10-CM

## 2025-03-14 DIAGNOSIS — E11.42 DIABETIC POLYNEUROPATHY ASSOCIATED WITH TYPE 2 DIABETES MELLITUS (HCC): Primary | ICD-10-CM

## 2025-03-14 DIAGNOSIS — E11.621 DIABETIC ULCER OF TOE OF RIGHT FOOT ASSOCIATED WITH TYPE 2 DIABETES MELLITUS, WITH NECROSIS OF BONE (HCC): ICD-10-CM

## 2025-03-14 DIAGNOSIS — L97.524 DIABETIC ULCER OF TOE OF LEFT FOOT ASSOCIATED WITH TYPE 2 DIABETES MELLITUS, WITH NECROSIS OF BONE (HCC): ICD-10-CM

## 2025-03-14 DIAGNOSIS — M86.10 ACUTE OSTEOMYELITIS (HCC): Primary | ICD-10-CM

## 2025-03-14 LAB
GLUCOSE BLD STRIP.AUTO-MCNC: 131 MG/DL (ref 65–100)
GLUCOSE BLD STRIP.AUTO-MCNC: 175 MG/DL (ref 65–100)
SERVICE CMNT-IMP: ABNORMAL
SERVICE CMNT-IMP: ABNORMAL

## 2025-03-14 PROCEDURE — 82962 GLUCOSE BLOOD TEST: CPT

## 2025-03-14 PROCEDURE — 29445 APPL RIGID TOT CNTC LEG CAST: CPT

## 2025-03-14 PROCEDURE — G0277 HBOT, FULL BODY CHAMBER, 30M: HCPCS

## 2025-03-14 RX ORDER — SODIUM CHLOR/HYPOCHLOROUS ACID 0.033 %
SOLUTION, IRRIGATION IRRIGATION ONCE
OUTPATIENT
Start: 2025-03-14 | End: 2025-03-14

## 2025-03-14 RX ORDER — GINSENG 100 MG
CAPSULE ORAL ONCE
OUTPATIENT
Start: 2025-03-14 | End: 2025-03-14

## 2025-03-14 RX ORDER — GENTAMICIN SULFATE 1 MG/G
OINTMENT TOPICAL ONCE
OUTPATIENT
Start: 2025-03-14 | End: 2025-03-14

## 2025-03-14 RX ORDER — BACITRACIN ZINC AND POLYMYXIN B SULFATE 500; 1000 [USP'U]/G; [USP'U]/G
OINTMENT TOPICAL ONCE
OUTPATIENT
Start: 2025-03-14 | End: 2025-03-14

## 2025-03-14 RX ORDER — MUPIROCIN 20 MG/G
OINTMENT TOPICAL ONCE
OUTPATIENT
Start: 2025-03-14 | End: 2025-03-14

## 2025-03-14 RX ORDER — BETAMETHASONE DIPROPIONATE 0.5 MG/G
CREAM TOPICAL ONCE
OUTPATIENT
Start: 2025-03-14 | End: 2025-03-14

## 2025-03-14 RX ORDER — TRIAMCINOLONE ACETONIDE 1 MG/G
OINTMENT TOPICAL ONCE
OUTPATIENT
Start: 2025-03-14 | End: 2025-03-14

## 2025-03-14 RX ORDER — SODIUM CHLOR/HYPOCHLOROUS ACID 0.033 %
SOLUTION, IRRIGATION IRRIGATION ONCE
Status: COMPLETED | OUTPATIENT
Start: 2025-03-14 | End: 2025-03-14

## 2025-03-14 RX ORDER — CLOBETASOL PROPIONATE 0.5 MG/G
OINTMENT TOPICAL ONCE
OUTPATIENT
Start: 2025-03-14 | End: 2025-03-14

## 2025-03-14 RX ORDER — NEOMYCIN/BACITRACIN/POLYMYXINB 3.5-400-5K
OINTMENT (GRAM) TOPICAL ONCE
OUTPATIENT
Start: 2025-03-14 | End: 2025-03-14

## 2025-03-14 RX ORDER — SILVER SULFADIAZINE 10 MG/G
CREAM TOPICAL ONCE
OUTPATIENT
Start: 2025-03-14 | End: 2025-03-14

## 2025-03-14 RX ADMIN — Medication: at 13:38

## 2025-03-14 ASSESSMENT — PAIN SCALES - GENERAL
PAINLEVEL_OUTOF10: 0

## 2025-03-14 NOTE — PROGRESS NOTES
HBO PROGRESS NOTE      NAME: Jacqueline Tay  MEDICAL RECORD NUMBER:  [id number]  AGE: 74 y.o.   GENDER: female  : 1950  EPISODE DATE:  3/12/2025     Subjective     HBO Treatment Number: 3 out of Total Treatments: 40    HBO Diagnosis:             Indications: Lower Extremity Diabetic Wound ___(site) (Left Foot)    Safety checks performed prior to treatment.  See doc flowsheets for documentation.    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)    Relevant Orders    POCT glucose       Musculoskeletal and Integument    Acute osteomyelitis (HCC) - Primary (Chronic)    Relevant Orders    POCT glucose       Physical Exam        General Appearance:  alert and oriented to person, place and time, well-developed and well-nourished, in no acute distress    Pre Tympanic Membrane Assessment:  Right: Normal  Left: Normal    Post Tympanic Membrane Assessment:  Left: Normal  Right: Normal    Pulmonary/Chest:  clear to auscultation bilaterally- no wheezes, rales or rhonchi, normal air movement, no respiratory distress    Cardiovascular:  normal    Plan        Patient placed in a full body Monoplace Chamber #: [id number]  Treatment Start Time: 1032     Pressure Reached Time: 1041  CHRISTOPHER : 2  Number of Air Breaks:        Decompression Time: 1212   Treatment End Time: 1219  Length of Treatment: 90 Minutes  Symptoms Noted During Treatment: None  Total

## 2025-03-17 ENCOUNTER — HOSPITAL ENCOUNTER (OUTPATIENT)
Dept: WOUND CARE | Age: 75
Discharge: HOME | End: 2025-03-17
Payer: MEDICARE

## 2025-03-17 VITALS
TEMPERATURE: 97.6 F | DIASTOLIC BLOOD PRESSURE: 58 MMHG | RESPIRATION RATE: 18 BRPM | HEART RATE: 52 BPM | SYSTOLIC BLOOD PRESSURE: 127 MMHG | OXYGEN SATURATION: 98 %

## 2025-03-17 VITALS
HEART RATE: 52 BPM | SYSTOLIC BLOOD PRESSURE: 127 MMHG | TEMPERATURE: 97.6 F | DIASTOLIC BLOOD PRESSURE: 58 MMHG | OXYGEN SATURATION: 98 % | RESPIRATION RATE: 18 BRPM

## 2025-03-17 DIAGNOSIS — L97.524 DIABETIC ULCER OF TOE OF LEFT FOOT ASSOCIATED WITH TYPE 2 DIABETES MELLITUS, WITH NECROSIS OF BONE: ICD-10-CM

## 2025-03-17 DIAGNOSIS — M86.10 ACUTE OSTEOMYELITIS (HCC): Primary | ICD-10-CM

## 2025-03-17 DIAGNOSIS — L97.514 DIABETIC ULCER OF TOE OF RIGHT FOOT ASSOCIATED WITH TYPE 2 DIABETES MELLITUS, WITH NECROSIS OF BONE: ICD-10-CM

## 2025-03-17 DIAGNOSIS — E11.621 DIABETIC ULCER OF TOE OF RIGHT FOOT ASSOCIATED WITH TYPE 2 DIABETES MELLITUS, WITH NECROSIS OF BONE: ICD-10-CM

## 2025-03-17 DIAGNOSIS — E11.621 DIABETIC ULCER OF TOE OF LEFT FOOT ASSOCIATED WITH TYPE 2 DIABETES MELLITUS, WITH NECROSIS OF BONE: ICD-10-CM

## 2025-03-17 DIAGNOSIS — E11.42 DIABETIC POLYNEUROPATHY ASSOCIATED WITH TYPE 2 DIABETES MELLITUS: Primary | ICD-10-CM

## 2025-03-17 LAB
GLUCOSE BLD STRIP.AUTO-MCNC: 115 MG/DL (ref 65–100)
GLUCOSE BLD STRIP.AUTO-MCNC: 117 MG/DL (ref 65–100)
GLUCOSE BLD STRIP.AUTO-MCNC: 135 MG/DL (ref 65–100)
SERVICE CMNT-IMP: ABNORMAL

## 2025-03-17 PROCEDURE — 99183 HYPERBARIC OXYGEN THERAPY: CPT | Performed by: FAMILY MEDICINE

## 2025-03-17 PROCEDURE — 82962 GLUCOSE BLOOD TEST: CPT

## 2025-03-17 PROCEDURE — G0277 HBOT, FULL BODY CHAMBER, 30M: HCPCS

## 2025-03-17 PROCEDURE — 29445 APPL RIGID TOT CNTC LEG CAST: CPT

## 2025-03-17 RX ORDER — SILVER SULFADIAZINE 10 MG/G
CREAM TOPICAL ONCE
OUTPATIENT
Start: 2025-03-17 | End: 2025-03-17

## 2025-03-17 RX ORDER — BETAMETHASONE DIPROPIONATE 0.5 MG/G
CREAM TOPICAL ONCE
OUTPATIENT
Start: 2025-03-17 | End: 2025-03-17

## 2025-03-17 RX ORDER — GENTAMICIN SULFATE 1 MG/G
OINTMENT TOPICAL ONCE
OUTPATIENT
Start: 2025-03-17 | End: 2025-03-17

## 2025-03-17 RX ORDER — NEOMYCIN/BACITRACIN/POLYMYXINB 3.5-400-5K
OINTMENT (GRAM) TOPICAL ONCE
OUTPATIENT
Start: 2025-03-17 | End: 2025-03-17

## 2025-03-17 RX ORDER — CLOBETASOL PROPIONATE 0.5 MG/G
OINTMENT TOPICAL ONCE
OUTPATIENT
Start: 2025-03-17 | End: 2025-03-17

## 2025-03-17 RX ORDER — GINSENG 100 MG
CAPSULE ORAL ONCE
OUTPATIENT
Start: 2025-03-17 | End: 2025-03-17

## 2025-03-17 RX ORDER — SODIUM CHLOR/HYPOCHLOROUS ACID 0.033 %
SOLUTION, IRRIGATION IRRIGATION ONCE
OUTPATIENT
Start: 2025-03-17 | End: 2025-03-17

## 2025-03-17 RX ORDER — TRIAMCINOLONE ACETONIDE 1 MG/G
OINTMENT TOPICAL ONCE
OUTPATIENT
Start: 2025-03-17 | End: 2025-03-17

## 2025-03-17 RX ORDER — SODIUM CHLOR/HYPOCHLOROUS ACID 0.033 %
SOLUTION, IRRIGATION IRRIGATION ONCE
Status: DISCONTINUED | OUTPATIENT
Start: 2025-03-17 | End: 2025-03-18 | Stop reason: HOSPADM

## 2025-03-17 RX ORDER — MUPIROCIN 20 MG/G
OINTMENT TOPICAL ONCE
OUTPATIENT
Start: 2025-03-17 | End: 2025-03-17

## 2025-03-17 RX ORDER — BACITRACIN ZINC AND POLYMYXIN B SULFATE 500; 1000 [USP'U]/G; [USP'U]/G
OINTMENT TOPICAL ONCE
OUTPATIENT
Start: 2025-03-17 | End: 2025-03-17

## 2025-03-17 ASSESSMENT — PAIN SCALES - GENERAL
PAINLEVEL_OUTOF10: 0

## 2025-03-17 NOTE — FLOWSHEET NOTE
03/17/25 1255   Left Leg Edema Point of Measurement   Leg circumference 21.5 cm   Ankle circumference 20.5 cm   Foot circumference 31 cm   Wound 01/14/25 Toe (Comment  which one) Left #2 Left Great toe   Date First Assessed/Time First Assessed: 01/14/25 1038   Present on Original Admission: Yes  Wound Approximate Age at First Assessment (Weeks): 50 weeks  Primary Wound Type: Diabetic Ulcer  Location: (c) Toe (Comment  which one)  Wound Location [place]...   Wound Image    Wound Etiology Diabetic Mota 3   Dressing Status Old drainage noted   Wound Cleansed Cleansed with saline   Dressing/Treatment Hydrofera blue   Offloading for Diabetic Foot Ulcers Total contact cast   Wound Length (cm) 1 cm   Wound Width (cm) 1.8 cm   Wound Depth (cm) 0.4 cm   Wound Surface Area (cm^2) 1.8 cm^2   Change in Wound Size % (l*w) -1100   Wound Volume (cm^3) 0.72 cm^3   Wound Healing % -860   Wound Assessment Pink/red;Devitalized tissue   Drainage Amount Small (< 25%)   Drainage Description Serosanguinous   Odor None   Jennifer-wound Assessment Hyperkeratosis (callous)   Wound Thickness Description not for Pressure Injury Full thickness   Pain Assessment   Pain Assessment None - Denies Pain   Pain Level 0

## 2025-03-18 ENCOUNTER — HOSPITAL ENCOUNTER (OUTPATIENT)
Dept: WOUND CARE | Age: 75
Discharge: HOME | End: 2025-03-18
Payer: MEDICARE

## 2025-03-18 VITALS
OXYGEN SATURATION: 96 % | TEMPERATURE: 98.2 F | DIASTOLIC BLOOD PRESSURE: 66 MMHG | SYSTOLIC BLOOD PRESSURE: 136 MMHG | HEART RATE: 50 BPM | RESPIRATION RATE: 18 BRPM

## 2025-03-18 DIAGNOSIS — E11.621 DIABETIC ULCER OF TOE OF RIGHT FOOT ASSOCIATED WITH TYPE 2 DIABETES MELLITUS, WITH NECROSIS OF BONE: ICD-10-CM

## 2025-03-18 DIAGNOSIS — L97.514 DIABETIC ULCER OF TOE OF RIGHT FOOT ASSOCIATED WITH TYPE 2 DIABETES MELLITUS, WITH NECROSIS OF BONE: ICD-10-CM

## 2025-03-18 DIAGNOSIS — E11.621 DIABETIC ULCER OF TOE OF LEFT FOOT ASSOCIATED WITH TYPE 2 DIABETES MELLITUS, WITH NECROSIS OF BONE: ICD-10-CM

## 2025-03-18 DIAGNOSIS — L97.524 DIABETIC ULCER OF TOE OF LEFT FOOT ASSOCIATED WITH TYPE 2 DIABETES MELLITUS, WITH NECROSIS OF BONE: ICD-10-CM

## 2025-03-18 DIAGNOSIS — M86.10 ACUTE OSTEOMYELITIS (HCC): Primary | ICD-10-CM

## 2025-03-18 LAB
GLUCOSE BLD STRIP.AUTO-MCNC: 102 MG/DL (ref 65–100)
GLUCOSE BLD STRIP.AUTO-MCNC: 132 MG/DL (ref 65–100)
SERVICE CMNT-IMP: ABNORMAL
SERVICE CMNT-IMP: ABNORMAL

## 2025-03-18 PROCEDURE — 99183 HYPERBARIC OXYGEN THERAPY: CPT | Performed by: FAMILY MEDICINE

## 2025-03-18 PROCEDURE — 82962 GLUCOSE BLOOD TEST: CPT

## 2025-03-18 PROCEDURE — G0277 HBOT, FULL BODY CHAMBER, 30M: HCPCS

## 2025-03-18 ASSESSMENT — PAIN SCALES - GENERAL
PAINLEVEL_OUTOF10: 0
PAINLEVEL_OUTOF10: 0

## 2025-03-18 NOTE — PROGRESS NOTES
Treatment Time (min): 106    Treatment Completion Status: Treatment completed without issue (d/c instructions reviewed, no questions)    I was present on these premises and immediately available to furnish assistance & direction throughout the HBO Treatment.     Jacqueline Tay is a 74 y.o. female  did successfully complete today's hyperbaric oxygen treatment at [facility] and HBO therapy.    In my clinical judgement, ongoing HBO therapy is  necessary at this time to assist with wound healing, preservation of limb, life, or function.    Supervision and attendance of Hyperbaric Oxygen Therapy provided. Continue HBO treatment as outlined in the treatment plan.    Hyperbaric Oxygen: Ms. Tay tolerated: Treatment Number: 6 without  issue.    Discharge Instructions were explained and given to Ms. Tay     Electronically signed by Thang Larry DO on 3/17/2025 at 7:24 AM

## 2025-03-18 NOTE — PROGRESS NOTES
HBO PROGRESS NOTE      NAME: Jacqueline Tay  MEDICAL RECORD NUMBER:  [id number]  AGE: 74 y.o.   GENDER: female  : 1950  EPISODE DATE:  3/18/2025     Subjective     HBO Treatment Number: 7 out of Total Treatments: 40    HBO Diagnosis:             Indications: Lower Extremity Diabetic Wound ___(site) (Left Foot)    Safety checks performed prior to treatment.  See doc flowsheets for documentation.    Objective        Recent Labs     25  1016 25  1224   POCGLU 132* 102*     Pre treatment Vital Signs       Temp: 98.2 °F (36.8 °C)     Pulse: 52     Respirations: 18     BP: (!) 124/59       Post treatment Vital Signs  Temp: 98.2 °F (36.8 °C)  Pulse: 50  Respirations: 18  BP: 136/66    Assessment        HBO Diagnosis:   Problem List Items Addressed This Visit          Endocrine    Diabetic ulcer of toe of right foot associated with type 2 diabetes mellitus, with necrosis of bone (HCC) (Chronic)    Relevant Orders    Notify physician (specify)    Hyperbaric Oxygen Therapy    Hypoglycemial protocol    POCT glucose    Care order/instruction    Care order/instruction    Care order/instruction    Care order/instruction    Care order/instruction    Care order/instruction    Care order/instruction    Care order/instruction    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 order/instruction    Care

## 2025-03-19 ENCOUNTER — HOSPITAL ENCOUNTER (OUTPATIENT)
Dept: WOUND CARE | Age: 75
Discharge: HOME | End: 2025-03-19
Payer: MEDICARE

## 2025-03-19 VITALS
OXYGEN SATURATION: 100 % | SYSTOLIC BLOOD PRESSURE: 145 MMHG | DIASTOLIC BLOOD PRESSURE: 62 MMHG | HEART RATE: 52 BPM | RESPIRATION RATE: 18 BRPM | TEMPERATURE: 98.6 F

## 2025-03-19 DIAGNOSIS — L97.524 DIABETIC ULCER OF TOE OF LEFT FOOT ASSOCIATED WITH TYPE 2 DIABETES MELLITUS, WITH NECROSIS OF BONE: ICD-10-CM

## 2025-03-19 DIAGNOSIS — E11.621 DIABETIC ULCER OF TOE OF LEFT FOOT ASSOCIATED WITH TYPE 2 DIABETES MELLITUS, WITH NECROSIS OF BONE: ICD-10-CM

## 2025-03-19 DIAGNOSIS — L97.514 DIABETIC ULCER OF TOE OF RIGHT FOOT ASSOCIATED WITH TYPE 2 DIABETES MELLITUS, WITH NECROSIS OF BONE: ICD-10-CM

## 2025-03-19 DIAGNOSIS — E11.621 DIABETIC ULCER OF TOE OF RIGHT FOOT ASSOCIATED WITH TYPE 2 DIABETES MELLITUS, WITH NECROSIS OF BONE: ICD-10-CM

## 2025-03-19 DIAGNOSIS — M86.10 ACUTE OSTEOMYELITIS (HCC): Primary | ICD-10-CM

## 2025-03-19 LAB
GLUCOSE BLD STRIP.AUTO-MCNC: 114 MG/DL (ref 65–100)
GLUCOSE BLD STRIP.AUTO-MCNC: 131 MG/DL (ref 65–100)
SERVICE CMNT-IMP: ABNORMAL
SERVICE CMNT-IMP: ABNORMAL

## 2025-03-19 PROCEDURE — 99183 HYPERBARIC OXYGEN THERAPY: CPT | Performed by: FAMILY MEDICINE

## 2025-03-19 PROCEDURE — G0277 HBOT, FULL BODY CHAMBER, 30M: HCPCS

## 2025-03-19 PROCEDURE — 82962 GLUCOSE BLOOD TEST: CPT

## 2025-03-19 ASSESSMENT — PAIN SCALES - GENERAL
PAINLEVEL_OUTOF10: 0
PAINLEVEL_OUTOF10: 0

## 2025-03-20 ENCOUNTER — HOSPITAL ENCOUNTER (OUTPATIENT)
Dept: WOUND CARE | Age: 75
Discharge: HOME | End: 2025-03-20
Payer: MEDICARE

## 2025-03-20 VITALS
DIASTOLIC BLOOD PRESSURE: 78 MMHG | SYSTOLIC BLOOD PRESSURE: 115 MMHG | RESPIRATION RATE: 17 BRPM | HEART RATE: 51 BPM | TEMPERATURE: 98.6 F | OXYGEN SATURATION: 100 %

## 2025-03-20 DIAGNOSIS — L97.514 DIABETIC ULCER OF TOE OF RIGHT FOOT ASSOCIATED WITH TYPE 2 DIABETES MELLITUS, WITH NECROSIS OF BONE: ICD-10-CM

## 2025-03-20 DIAGNOSIS — L97.524 DIABETIC ULCER OF TOE OF LEFT FOOT ASSOCIATED WITH TYPE 2 DIABETES MELLITUS, WITH NECROSIS OF BONE: ICD-10-CM

## 2025-03-20 DIAGNOSIS — M86.10 ACUTE OSTEOMYELITIS (HCC): Primary | ICD-10-CM

## 2025-03-20 DIAGNOSIS — E11.621 DIABETIC ULCER OF TOE OF RIGHT FOOT ASSOCIATED WITH TYPE 2 DIABETES MELLITUS, WITH NECROSIS OF BONE: ICD-10-CM

## 2025-03-20 DIAGNOSIS — E11.621 DIABETIC ULCER OF TOE OF LEFT FOOT ASSOCIATED WITH TYPE 2 DIABETES MELLITUS, WITH NECROSIS OF BONE: ICD-10-CM

## 2025-03-20 LAB
GLUCOSE BLD STRIP.AUTO-MCNC: 104 MG/DL (ref 65–100)
GLUCOSE BLD STRIP.AUTO-MCNC: 155 MG/DL (ref 65–100)
SERVICE CMNT-IMP: ABNORMAL
SERVICE CMNT-IMP: ABNORMAL

## 2025-03-20 PROCEDURE — 82962 GLUCOSE BLOOD TEST: CPT

## 2025-03-20 PROCEDURE — 99183 HYPERBARIC OXYGEN THERAPY: CPT | Performed by: FAMILY MEDICINE

## 2025-03-20 PROCEDURE — G0277 HBOT, FULL BODY CHAMBER, 30M: HCPCS

## 2025-03-20 ASSESSMENT — PAIN SCALES - GENERAL: PAINLEVEL_OUTOF10: 0

## 2025-03-20 NOTE — PROGRESS NOTES
Care order/instruction    Care order/instruction    Care order/instruction    Care order/instruction    Care order/instruction       Physical Exam        General Appearance:  alert and oriented to person, place and time, well-developed and well-nourished, in no acute distress    Pre Tympanic Membrane Assessment:  Right: Normal  Left: Normal    Post Tympanic Membrane Assessment:  Left: Normal  Right: Normal    Pulmonary/Chest:  clear to auscultation bilaterally- no wheezes, rales or rhonchi, normal air movement, no respiratory distress    Cardiovascular:  normal    Plan        Patient placed in a full body Monoplace Chamber #: [id number]  Treatment Start Time: 1025     Pressure Reached Time: 1034  CHRISTOPHER : 2  Number of Air Breaks:        Decompression Time: 1205   Treatment End Time: 1212  Length of Treatment: 90 Minutes  Symptoms Noted During Treatment: None  Total Treatment Time (min): 107    Treatment Completion Status: Treatment completed without issue (d/c instructions reviewed, no questions)    I was present on these premises and immediately available to furnish assistance & direction throughout the HBO Treatment.     Jacqueline Tay is a 74 y.o. female  did successfully complete today's hyperbaric oxygen treatment at [facility] and HBO therapy.    In my clinical judgement, ongoing HBO therapy is  necessary at this time to assist with wound healing, preservation of limb, life, or function.    Supervision and attendance of Hyperbaric Oxygen Therapy provided. Continue HBO treatment as outlined in the treatment plan.    Hyperbaric Oxygen: Ms. Tay tolerated: Treatment Number: 9 without  issue.    Discharge Instructions were explained and given to Ms. Tay     Electronically signed by Thang Larry DO on 3/20/2025 at 5:08 PM

## 2025-03-20 NOTE — PROGRESS NOTES
(min): 106    Treatment Completion Status: Treatment completed without issue (d/c instructions reviewed, no questions)    I was present on these premises and immediately available to furnish assistance & direction throughout the HBO Treatment.     Jacqueline Tay is a 74 y.o. female  did successfully complete today's hyperbaric oxygen treatment at [facility] and HBO therapy.    In my clinical judgement, ongoing HBO therapy is  necessary at this time to assist with wound healing, preservation of limb, life, or function.    Supervision and attendance of Hyperbaric Oxygen Therapy provided. Continue HBO treatment as outlined in the treatment plan.    Hyperbaric Oxygen: Ms. Tay tolerated: Treatment Number: 8 without  issue.    Discharge Instructions were explained and given to Ms. Tay     Electronically signed by Thang Larry DO on 3/19/2025 at 7:30 AM

## 2025-03-21 ENCOUNTER — HOSPITAL ENCOUNTER (OUTPATIENT)
Dept: WOUND CARE | Age: 75
Discharge: HOME | End: 2025-03-21
Payer: MEDICARE

## 2025-03-21 VITALS
OXYGEN SATURATION: 95 % | SYSTOLIC BLOOD PRESSURE: 136 MMHG | TEMPERATURE: 98.5 F | HEART RATE: 50 BPM | RESPIRATION RATE: 18 BRPM | DIASTOLIC BLOOD PRESSURE: 66 MMHG

## 2025-03-21 DIAGNOSIS — L97.514 DIABETIC ULCER OF TOE OF RIGHT FOOT ASSOCIATED WITH TYPE 2 DIABETES MELLITUS, WITH NECROSIS OF BONE: ICD-10-CM

## 2025-03-21 DIAGNOSIS — M86.10 ACUTE OSTEOMYELITIS (HCC): Primary | ICD-10-CM

## 2025-03-21 DIAGNOSIS — E11.621 DIABETIC ULCER OF TOE OF RIGHT FOOT ASSOCIATED WITH TYPE 2 DIABETES MELLITUS, WITH NECROSIS OF BONE: ICD-10-CM

## 2025-03-21 DIAGNOSIS — L97.524 DIABETIC ULCER OF TOE OF LEFT FOOT ASSOCIATED WITH TYPE 2 DIABETES MELLITUS, WITH NECROSIS OF BONE: ICD-10-CM

## 2025-03-21 DIAGNOSIS — E11.621 DIABETIC ULCER OF TOE OF LEFT FOOT ASSOCIATED WITH TYPE 2 DIABETES MELLITUS, WITH NECROSIS OF BONE: ICD-10-CM

## 2025-03-21 LAB
GLUCOSE BLD STRIP.AUTO-MCNC: 108 MG/DL (ref 65–100)
GLUCOSE BLD STRIP.AUTO-MCNC: 118 MG/DL (ref 65–100)
GLUCOSE BLD STRIP.AUTO-MCNC: 125 MG/DL (ref 65–100)
SERVICE CMNT-IMP: ABNORMAL

## 2025-03-21 PROCEDURE — G0277 HBOT, FULL BODY CHAMBER, 30M: HCPCS

## 2025-03-21 PROCEDURE — 82962 GLUCOSE BLOOD TEST: CPT

## 2025-03-21 ASSESSMENT — PAIN SCALES - GENERAL
PAINLEVEL_OUTOF10: 0
PAINLEVEL_OUTOF10: 0

## 2025-03-21 NOTE — PROGRESS NOTES
HBO PROGRESS NOTE      NAME: Jacqueline Tay  MEDICAL RECORD NUMBER:  [id number]  AGE: 74 y.o.   GENDER: female  : 1950  EPISODE DATE:  3/21/2025     Subjective     HBO Treatment Number: 10 out of Total Treatments: 40    HBO Diagnosis:             Indications: Lower Extremity Diabetic Wound ___(site)    Safety checks performed prior to treatment.  See doc flowsheets for documentation.    Objective        Recent Labs     25  1033 25  1223   POCGLU 125* 108*     Pre treatment Vital Signs       Temp: 98.5 °F (36.9 °C)     Pulse: 50     Respirations: 18     BP: 133/68       Post treatment Vital Signs  Temp: 98.5 °F (36.9 °C)  Pulse: 50  Respirations: 18  BP: 136/66    Assessment        HBO Diagnosis:   Problem List Items Addressed This Visit          Endocrine    Diabetic ulcer of toe of right foot associated with type 2 diabetes mellitus, with necrosis of bone (HCC) (Chronic)    Relevant Orders    Notify physician (specify)    Hyperbaric Oxygen Therapy    Hypoglycemial protocol    POCT glucose    Care order/instruction    Care order/instruction    Care order/instruction    Care order/instruction    Care order/instruction    Care order/instruction    Care order/instruction    Care order/instruction    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 order/instruction    Care order/instruction

## 2025-03-24 ENCOUNTER — HOSPITAL ENCOUNTER (OUTPATIENT)
Dept: WOUND CARE | Age: 75
Discharge: HOME | End: 2025-03-24
Payer: MEDICARE

## 2025-03-24 VITALS
OXYGEN SATURATION: 92 % | HEART RATE: 68 BPM | TEMPERATURE: 98.5 F | DIASTOLIC BLOOD PRESSURE: 70 MMHG | SYSTOLIC BLOOD PRESSURE: 139 MMHG | RESPIRATION RATE: 19 BRPM

## 2025-03-24 VITALS
OXYGEN SATURATION: 92 % | TEMPERATURE: 98.5 F | DIASTOLIC BLOOD PRESSURE: 70 MMHG | SYSTOLIC BLOOD PRESSURE: 139 MMHG | HEART RATE: 68 BPM | RESPIRATION RATE: 19 BRPM

## 2025-03-24 DIAGNOSIS — E11.621 DIABETIC ULCER OF TOE OF RIGHT FOOT ASSOCIATED WITH TYPE 2 DIABETES MELLITUS, WITH NECROSIS OF BONE: ICD-10-CM

## 2025-03-24 DIAGNOSIS — L97.524 DIABETIC ULCER OF TOE OF LEFT FOOT ASSOCIATED WITH TYPE 2 DIABETES MELLITUS, WITH NECROSIS OF BONE: ICD-10-CM

## 2025-03-24 DIAGNOSIS — M86.10 ACUTE OSTEOMYELITIS (HCC): Primary | ICD-10-CM

## 2025-03-24 DIAGNOSIS — E11.621 TYPE 2 DIABETES MELLITUS WITH FOOT ULCER, WITHOUT LONG-TERM CURRENT USE OF INSULIN (HCC): ICD-10-CM

## 2025-03-24 DIAGNOSIS — L97.514 DIABETIC ULCER OF TOE OF RIGHT FOOT ASSOCIATED WITH TYPE 2 DIABETES MELLITUS, WITH NECROSIS OF BONE: ICD-10-CM

## 2025-03-24 DIAGNOSIS — E11.621 DIABETIC ULCER OF TOE OF LEFT FOOT ASSOCIATED WITH TYPE 2 DIABETES MELLITUS, WITH NECROSIS OF BONE: ICD-10-CM

## 2025-03-24 DIAGNOSIS — L97.509 TYPE 2 DIABETES MELLITUS WITH FOOT ULCER, WITHOUT LONG-TERM CURRENT USE OF INSULIN (HCC): ICD-10-CM

## 2025-03-24 DIAGNOSIS — E11.42 DIABETIC POLYNEUROPATHY ASSOCIATED WITH TYPE 2 DIABETES MELLITUS: ICD-10-CM

## 2025-03-24 LAB
GLUCOSE BLD STRIP.AUTO-MCNC: 117 MG/DL (ref 65–100)
GLUCOSE BLD STRIP.AUTO-MCNC: 125 MG/DL (ref 65–100)
GLUCOSE BLD STRIP.AUTO-MCNC: 138 MG/DL (ref 65–100)
SERVICE CMNT-IMP: ABNORMAL

## 2025-03-24 PROCEDURE — 11042 DBRDMT SUBQ TIS 1ST 20SQCM/<: CPT

## 2025-03-24 PROCEDURE — 99183 HYPERBARIC OXYGEN THERAPY: CPT | Performed by: FAMILY MEDICINE

## 2025-03-24 PROCEDURE — G0277 HBOT, FULL BODY CHAMBER, 30M: HCPCS

## 2025-03-24 PROCEDURE — 82962 GLUCOSE BLOOD TEST: CPT

## 2025-03-24 ASSESSMENT — PAIN SCALES - GENERAL: PAINLEVEL_OUTOF10: 0

## 2025-03-24 NOTE — FLOWSHEET NOTE
03/24/25 1529   Wound 01/14/25 Toe (Comment  which one) Right #1 3rd toe   Date First Assessed/Time First Assessed: 01/14/25 1038   Present on Original Admission: Yes  Wound Approximate Age at First Assessment (Weeks): 50 weeks  Primary Wound Type: Diabetic Ulcer  Location: (c) Toe (Comment  which one)  Wound Location [place]...   Wound Image     Wound Etiology Diabetic Mota 3   Dressing Status Old drainage noted   Wound Cleansed Cleansed with saline   Dressing/Treatment Collagen;Hydrofera blue   Offloading for Diabetic Foot Ulcers Offloading ordered   Wound Length (cm) 0.5 cm   Wound Width (cm) 0.3 cm   Wound Depth (cm) 0.3 cm   Wound Surface Area (cm^2) 0.15 cm^2   Change in Wound Size % (l*w) -66.67   Wound Volume (cm^3) 0.045 cm^3   Wound Healing % -67   Post-Procedure Length (cm) 0.5 cm   Post-Procedure Width (cm) 0.4 cm   Post-Procedure Depth (cm) 0.3 cm   Post-Procedure Surface Area (cm^2) 0.2 cm^2   Post-Procedure Volume (cm^3) 0.06 cm^3   Wound Assessment Pink/red   Drainage Amount Moderate (25-50%)   Drainage Description Serosanguinous   Odor None   Jennifer-wound Assessment Hyperkeratosis (callous)   Wound Thickness Description not for Pressure Injury Full thickness   Wound 01/14/25 Toe (Comment  which one) Left #2 Left Great toe   Date First Assessed/Time First Assessed: 01/14/25 1038   Present on Original Admission: Yes  Wound Approximate Age at First Assessment (Weeks): 50 weeks  Primary Wound Type: Diabetic Ulcer  Location: (c) Toe (Comment  which one)  Wound Location [place]...   Wound Image     Wound Etiology Diabetic Mota 3   Dressing Status Old drainage noted   Wound Cleansed Cleansed with saline   Dressing/Treatment Hydrofera blue   Offloading for Diabetic Foot Ulcers Total contact cast   Wound Length (cm) 0.2 cm   Wound Width (cm) 0.2 cm   Wound Depth (cm) 0.1 cm   Wound Surface Area (cm^2) 0.04 cm^2   Change in Wound Size % (l*w) 73.33   Wound Volume (cm^3) 0.004 cm^3   Wound Healing % 95

## 2025-03-24 NOTE — WOUND CARE
Discharge Instructions for  [facility]  [address] [place] [address]  [address] [place] [address]  Phone [phone]   Fax [phone]      NAME:  Jacqueline Tay  YOB: 1950  MEDICAL RECORD NUMBER:  [id number]  DATE:  3/24/2025    Return Appointment:   1 week with Orquidea Cabral NP      Instructions:   Right 3rd Toe:  Cleanse with normal saline  Allow Vashe Wound Solution moistened gauze to sit on wound bed for 60 seconds  Apply Hydrofera Blue Ready to wound bed  Secure with rolled gauze and tape  Change 3 times a week       Left Great Toe:  Apply Vashe wound solution to wound bed.   Apply Xeroform over wound bed.   Cover with gauze and rolled gauze.  Apply TCC EZ 3\" (Cast) to be applied on left foot weekly.      Wear tubigrip from knees to toes on bilateral legs.      Continue HBO.  Continue taking antibiotics as prescribed.      Patient to offload wound with DARCO SHOE WITH PEG ASSIST INSERT while standing or weight bearing.     Elevate legs when sitting.  Avoid prolonged standing or sitting with legs in dependent position.  Increase protein intake to promote wound healing. Brandon and Ensure are examples of protein supplements.  Wound healing is greatly slowed when glucose levels are greater than 200. Monitor glucose levels to ensure tight glucose control.     Protein:   Egg ~ 6g  Yogurt ~ 15g  Half cup of cottage cheese ~ 15g  Chicken breast ~ 30g  Ragland filet ~ 40g         Should you experience increased redness, swelling, pain, foul odor, size of wound(s), or have a temperature over 101 degrees please contact the [facility]aling Center at [phone] or if after hours contact your primary care physician or go to the hospital emergency department.    PLEASE NOTE: IF YOU ARE UNABLE TO OBTAIN WOUND SUPPLIES, CONTINUE TO USE THE SUPPLIES YOU HAVE AVAILABLE UNTIL YOU ARE ABLE TO REACH US. IT IS MOST IMPORTANT TO KEEP THE WOUND COVERED AT ALL TIMES.    Electronically signed Hollie SHEA

## 2025-03-24 NOTE — PROGRESS NOTES
HBO PROGRESS NOTE      NAME: Jacqueline Tay  MEDICAL RECORD NUMBER:  [id number]  AGE: 74 y.o.   GENDER: female  : 1950  EPISODE DATE:  3/24/2025     Subjective     HBO Treatment Number: 11 out of Total Treatments: 40    HBO Diagnosis:             Indications: Lower Extremity Diabetic Wound ___(site) (Left Foot)    Safety checks performed prior to treatment.  See doc flowsheets for documentation.    Objective        Recent Labs     25  1027 25  1217   POCGLU 138* 117*     Pre treatment Vital Signs       Temp: 98.5 °F (36.9 °C)     Pulse: 55     Respirations: 18     BP: (!) 135/58       Post treatment Vital Signs  Temp: 98.5 °F (36.9 °C)  Pulse: 68  Respirations: 19  BP: 139/70    Assessment        HBO Diagnosis:   Problem List Items Addressed This Visit          Endocrine    * (Principal) Diabetic ulcer of toe of right foot associated with type 2 diabetes mellitus, with necrosis of bone (HCC) (Chronic)    Relevant Orders    Notify physician (specify)    Hyperbaric Oxygen Therapy    Hypoglycemial protocol    POCT glucose    Care order/instruction    Care order/instruction    Care order/instruction    Care order/instruction    Care order/instruction    Care order/instruction    Care order/instruction    Care order/instruction    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

## 2025-03-24 NOTE — ASSESSMENT & PLAN NOTE
Assessment  Right 3rd toe wound is smaller; periwound is heavy callus  Remains on Keflex  She is tolerating HBOT  Plan  Excisional debridement to remove devitalized tissue and promote wound healing   Continue Vashe, Hydrofera blue, Darco to right foot; change 3x weekly  Continue HBOT and abx  RTC 1 week

## 2025-03-24 NOTE — DISCHARGE INSTRUCTIONS
Right 3rd Toe:  Cleanse with normal saline  Allow Vashe Wound Solution moistened gauze to sit on wound bed for 60 seconds  Apply Hydrofera Blue Ready to wound bed  Secure with rolled gauze and tape  Change 3 times a week       Left Great Toe:  Apply Vashe wound solution to wound bed.   Apply Xeroform over wound bed.   Cover with gauze and rolled gauze.  Apply TCC EZ 3\" (Cast) to be applied on left foot weekly.      Wear tubigrip from knees to toes on bilateral legs.      Continue HBO.  Continue taking antibiotics as prescribed.      Patient to offload wound with DARCO SHOE WITH PEG ASSIST INSERT while standing or weight bearing.     Elevate legs when sitting.  Avoid prolonged standing or sitting with legs in dependent position.  Increase protein intake to promote wound healing. Brandon and Ensure are examples of protein supplements.  Wound healing is greatly slowed when glucose levels are greater than 200. Monitor glucose levels to ensure tight glucose control.     Protein:   Egg ~ 6g  Yogurt ~ 15g  Half cup of cottage cheese ~ 15g  Chicken breast ~ 30g  Liberal filet ~ 40g

## 2025-03-24 NOTE — PROGRESS NOTES
[facility]  Procedure Note    Jacqueline Tay  MEDICAL RECORD NUMBER: [id number]  AGE: 74 y.o.     GENDER: female    : 1950  EPISODE DATE: 3/24/2025    Problem List Items Addressed This Visit          Endocrine    Diabetic ulcer of toe of right foot associated with type 2 diabetes mellitus, with necrosis of bone (HCC) (Chronic)      Assessment  Right 3rd toe wound is smaller; periwound is heavy callus  Remains on Keflex  She is tolerating HBOT  Plan  Excisional debridement to remove devitalized tissue and promote wound healing   Continue Vashe, Hydrofera blue, Darco to right foot; change 3x weekly  Continue HBOT and abx  RTC 1 week         Diabetic polyneuropathy associated with type 2 diabetes mellitus (HCC) (Chronic)    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  Performed by: DORON Patrick NP  Consent obtained: Yes  Time out taken: Yes  Pain control:     Wound #: 1  Diabetic/pressure/chronic non-pressure ulcers only: diabetic ulcer, fat layer exposed was/were debrided using curette. The wound(s) was/were debrided down through/to subcutaneous tissue. Devitalized tissue debrided: slough, exudate, and callus. Pre and post debridement measurements: see flow sheet.    Wound 24 Toe (Comment  which one) Left;Anterior \"blister\" per pt

## 2025-03-24 NOTE — ASSESSMENT & PLAN NOTE
Assessment  Heavy callus but no open wound to left great toe  Remains on Keflex  She is tolerating HBOT  Plan  No open wound after callus pared  She is agreeable to continue TCC  Continue Vashe, Xeroform gauze, TCC; change weekly  Continue HBOT and abx  RTC 1 week

## 2025-03-24 NOTE — WOUND CARE
Total Contact Cast    NAME:  Jacqueline Tay  YOB: 1950  MEDICAL RECORD NUMBER:  [id number]  DATE:  3/24/2025    Goal:  Patient will maintain integrity of cast, avoid mobility hazards, and report complications that may occur (foul odor, pain, numbness, cracked cast).    Removed old contact cast if indicated and wash extremity with soap and water  Applied ordered dressing over wound(s)   Applied cast padding  Applied foam padding  Applied per Orquidea Cabral NP  Total Contact Cast was applied in the [facility] Care Center to left lower leg  Applied cast material fiberglass  Allow cast to dry   Instructed patient to report to health care provider, including wound care center, any back pain, hip pain, or leg pain, numbness of toes, or any odor coming from the cast.   Instructed patient not to stick any foreign objects down into cast.  Instructed patient to utilize assistive devices(crutches, cane or walker) as ordered.  Instructed patient to continue offloading as directed.     Applied cast per  Guidelines    Electronically signed by Hollie BHAT RN on 3/24/2025 at 3:35 PM

## 2025-03-25 ENCOUNTER — HOSPITAL ENCOUNTER (OUTPATIENT)
Dept: WOUND CARE | Age: 75
Discharge: HOME | End: 2025-03-25
Payer: MEDICARE

## 2025-03-25 VITALS
RESPIRATION RATE: 18 BRPM | SYSTOLIC BLOOD PRESSURE: 137 MMHG | HEART RATE: 56 BPM | OXYGEN SATURATION: 100 % | TEMPERATURE: 98.7 F | DIASTOLIC BLOOD PRESSURE: 71 MMHG

## 2025-03-25 DIAGNOSIS — E11.621 DIABETIC ULCER OF TOE OF RIGHT FOOT ASSOCIATED WITH TYPE 2 DIABETES MELLITUS, WITH NECROSIS OF BONE: ICD-10-CM

## 2025-03-25 DIAGNOSIS — L97.524 DIABETIC ULCER OF TOE OF LEFT FOOT ASSOCIATED WITH TYPE 2 DIABETES MELLITUS, WITH NECROSIS OF BONE: ICD-10-CM

## 2025-03-25 DIAGNOSIS — M86.10 ACUTE OSTEOMYELITIS (HCC): Primary | ICD-10-CM

## 2025-03-25 DIAGNOSIS — L97.514 DIABETIC ULCER OF TOE OF RIGHT FOOT ASSOCIATED WITH TYPE 2 DIABETES MELLITUS, WITH NECROSIS OF BONE: ICD-10-CM

## 2025-03-25 DIAGNOSIS — E11.621 DIABETIC ULCER OF TOE OF LEFT FOOT ASSOCIATED WITH TYPE 2 DIABETES MELLITUS, WITH NECROSIS OF BONE: ICD-10-CM

## 2025-03-25 LAB
GLUCOSE BLD STRIP.AUTO-MCNC: 120 MG/DL (ref 65–100)
GLUCOSE BLD STRIP.AUTO-MCNC: 162 MG/DL (ref 65–100)
SERVICE CMNT-IMP: ABNORMAL
SERVICE CMNT-IMP: ABNORMAL

## 2025-03-25 PROCEDURE — G0277 HBOT, FULL BODY CHAMBER, 30M: HCPCS

## 2025-03-25 PROCEDURE — 99183 HYPERBARIC OXYGEN THERAPY: CPT | Performed by: FAMILY MEDICINE

## 2025-03-25 PROCEDURE — 82962 GLUCOSE BLOOD TEST: CPT

## 2025-03-25 ASSESSMENT — PAIN SCALES - GENERAL
PAINLEVEL_OUTOF10: 0
PAINLEVEL_OUTOF10: 0

## 2025-03-26 ENCOUNTER — HOSPITAL ENCOUNTER (OUTPATIENT)
Dept: WOUND CARE | Age: 75
Discharge: HOME | End: 2025-03-26
Payer: MEDICARE

## 2025-03-26 VITALS
HEART RATE: 46 BPM | SYSTOLIC BLOOD PRESSURE: 125 MMHG | RESPIRATION RATE: 18 BRPM | OXYGEN SATURATION: 98 % | DIASTOLIC BLOOD PRESSURE: 65 MMHG | TEMPERATURE: 98.4 F

## 2025-03-26 DIAGNOSIS — E11.621 DIABETIC ULCER OF TOE OF LEFT FOOT ASSOCIATED WITH TYPE 2 DIABETES MELLITUS, WITH NECROSIS OF BONE: ICD-10-CM

## 2025-03-26 DIAGNOSIS — E11.621 DIABETIC ULCER OF TOE OF RIGHT FOOT ASSOCIATED WITH TYPE 2 DIABETES MELLITUS, WITH NECROSIS OF BONE: ICD-10-CM

## 2025-03-26 DIAGNOSIS — L97.514 DIABETIC ULCER OF TOE OF RIGHT FOOT ASSOCIATED WITH TYPE 2 DIABETES MELLITUS, WITH NECROSIS OF BONE: ICD-10-CM

## 2025-03-26 DIAGNOSIS — L97.524 DIABETIC ULCER OF TOE OF LEFT FOOT ASSOCIATED WITH TYPE 2 DIABETES MELLITUS, WITH NECROSIS OF BONE: ICD-10-CM

## 2025-03-26 DIAGNOSIS — M86.10 ACUTE OSTEOMYELITIS (HCC): Primary | ICD-10-CM

## 2025-03-26 LAB
GLUCOSE BLD STRIP.AUTO-MCNC: 131 MG/DL (ref 65–100)
GLUCOSE BLD STRIP.AUTO-MCNC: 146 MG/DL (ref 65–100)
SERVICE CMNT-IMP: ABNORMAL
SERVICE CMNT-IMP: ABNORMAL

## 2025-03-26 PROCEDURE — G0277 HBOT, FULL BODY CHAMBER, 30M: HCPCS

## 2025-03-26 PROCEDURE — 82962 GLUCOSE BLOOD TEST: CPT

## 2025-03-26 PROCEDURE — 99183 HYPERBARIC OXYGEN THERAPY: CPT | Performed by: FAMILY MEDICINE

## 2025-03-26 ASSESSMENT — PAIN SCALES - GENERAL: PAINLEVEL_OUTOF10: 0

## 2025-03-26 NOTE — PROGRESS NOTES
HBO PROGRESS NOTE      NAME: Jacqueline Tay  MEDICAL RECORD NUMBER:  [id number]  AGE: 74 y.o.   GENDER: female  : 1950  EPISODE DATE:  3/25/2025     Subjective     HBO Treatment Number: 12 out of Total Treatments: 40    HBO Diagnosis:             Indications: Lower Extremity Diabetic Wound ___(site)    Safety checks performed prior to treatment.  See doc flowsheets for documentation.    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)    Relevant Orders    POCT glucose       Musculoskeletal and Integument    Acute osteomyelitis (HCC) - Primary (Chronic)    Relevant Orders    POCT glucose       Physical Exam        General Appearance:  alert and oriented to person, place and time, well-developed and well-nourished, in no acute distress    Pre Tympanic Membrane Assessment:  Right: Normal  Left: Normal    Post Tympanic Membrane Assessment:  Left: Normal  Right: Normal    Pulmonary/Chest:  clear to auscultation bilaterally- no wheezes, rales or rhonchi, normal air movement, no respiratory distress    Cardiovascular:  normal    Plan        Patient placed in a full body Monoplace Chamber #: [id number]  Treatment Start Time: 1025     Pressure Reached Time: 1034  CHRISTOPHER : 2  Number of Air Breaks:        Decompression Time: 1205   Treatment End Time: 1212  Length of Treatment: 90 Minutes  Symptoms Noted During Treatment: None  Total Treatment Time (min):

## 2025-03-27 ENCOUNTER — HOSPITAL ENCOUNTER (OUTPATIENT)
Dept: WOUND CARE | Age: 75
Discharge: HOME | End: 2025-03-27
Payer: MEDICARE

## 2025-03-27 VITALS
OXYGEN SATURATION: 97 % | TEMPERATURE: 98.5 F | HEART RATE: 47 BPM | SYSTOLIC BLOOD PRESSURE: 133 MMHG | DIASTOLIC BLOOD PRESSURE: 69 MMHG | RESPIRATION RATE: 18 BRPM

## 2025-03-27 DIAGNOSIS — L97.514 DIABETIC ULCER OF TOE OF RIGHT FOOT ASSOCIATED WITH TYPE 2 DIABETES MELLITUS, WITH NECROSIS OF BONE: ICD-10-CM

## 2025-03-27 DIAGNOSIS — M86.10 ACUTE OSTEOMYELITIS (HCC): Primary | ICD-10-CM

## 2025-03-27 DIAGNOSIS — E11.621 DIABETIC ULCER OF TOE OF RIGHT FOOT ASSOCIATED WITH TYPE 2 DIABETES MELLITUS, WITH NECROSIS OF BONE: ICD-10-CM

## 2025-03-27 DIAGNOSIS — L97.524 DIABETIC ULCER OF TOE OF LEFT FOOT ASSOCIATED WITH TYPE 2 DIABETES MELLITUS, WITH NECROSIS OF BONE: ICD-10-CM

## 2025-03-27 DIAGNOSIS — E11.621 DIABETIC ULCER OF TOE OF LEFT FOOT ASSOCIATED WITH TYPE 2 DIABETES MELLITUS, WITH NECROSIS OF BONE: ICD-10-CM

## 2025-03-27 LAB
GLUCOSE BLD STRIP.AUTO-MCNC: 111 MG/DL (ref 65–100)
GLUCOSE BLD STRIP.AUTO-MCNC: 152 MG/DL (ref 65–100)
SERVICE CMNT-IMP: ABNORMAL
SERVICE CMNT-IMP: ABNORMAL

## 2025-03-27 PROCEDURE — 82962 GLUCOSE BLOOD TEST: CPT

## 2025-03-27 PROCEDURE — G0277 HBOT, FULL BODY CHAMBER, 30M: HCPCS

## 2025-03-27 PROCEDURE — 99183 HYPERBARIC OXYGEN THERAPY: CPT | Performed by: FAMILY MEDICINE

## 2025-03-27 ASSESSMENT — PAIN SCALES - GENERAL: PAINLEVEL_OUTOF10: 0

## 2025-03-27 NOTE — PROGRESS NOTES
HBO PROGRESS NOTE      NAME: Jacqueline Tay  MEDICAL RECORD NUMBER:  [id number]  AGE: 74 y.o.   GENDER: female  : 1950  EPISODE DATE:  3/26/2025     Subjective     HBO Treatment Number: 13 out of Total Treatments: 40    HBO Diagnosis:             Indications: Lower Extremity Diabetic Wound ___(site) (Left Foot)    Safety checks performed prior to treatment.  See doc flowsheets for documentation.    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)    Relevant Orders    POCT glucose       Musculoskeletal and Integument    Acute osteomyelitis (HCC) - Primary (Chronic)    Relevant Orders    POCT glucose       Physical Exam        General Appearance:  alert and oriented to person, place and time, well-developed and well-nourished, in no acute distress    Pre Tympanic Membrane Assessment:  Right: Normal  Left: Normal    Post Tympanic Membrane Assessment:  Left: Normal  Right: Normal    Pulmonary/Chest:  clear to auscultation bilaterally- no wheezes, rales or rhonchi, normal air movement, no respiratory distress    Cardiovascular:  normal    Plan        Patient placed in a full body Monoplace Chamber #: [id number]  Treatment Start Time: 1034     Pressure Reached Time: 1043  CHRISTOPHER : 2  Number of Air Breaks:        Decompression Time: 1215   Treatment End Time: 1221  Length of Treatment: 90 Minutes  Symptoms Noted During Treatment: None  Total Treatment

## 2025-03-27 NOTE — PROGRESS NOTES
HBO PROGRESS NOTE      NAME: Jacqueline Tay  MEDICAL RECORD NUMBER:  [id number]  AGE: 74 y.o.   GENDER: female  : 1950  EPISODE DATE:  3/27/2025     Subjective     HBO Treatment Number: 14 out of Total Treatments: 40    HBO Diagnosis:             Indications: Lower Extremity Diabetic Wound ___(site) (Left Foot)    Safety checks performed prior to treatment.  See doc flowsheets for documentation.    Objective        Recent Labs     25  1025 25  1217   POCGLU 152* 111*     Pre treatment Vital Signs       Temp: 98.5 °F (36.9 °C)     Pulse: 56     Respirations: 18     BP: (!) 122/55       Post treatment Vital Signs  Temp: 98.5 °F (36.9 °C)  Pulse: (!) 47  Respirations: 18  BP: 133/69    Assessment        HBO Diagnosis:   Problem List Items Addressed This Visit          Endocrine    * (Principal) Diabetic ulcer of toe of right foot associated with type 2 diabetes mellitus, with necrosis of bone (HCC) (Chronic)    Relevant Orders    Notify physician (specify)    Hyperbaric Oxygen Therapy    Hypoglycemial protocol    POCT glucose    Care order/instruction    Care order/instruction    Care order/instruction    Care order/instruction    Care order/instruction    Care order/instruction    Care order/instruction    Care order/instruction    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

## 2025-03-28 ENCOUNTER — HOSPITAL ENCOUNTER (OUTPATIENT)
Dept: WOUND CARE | Age: 75
Discharge: HOME | End: 2025-03-28
Payer: MEDICARE

## 2025-03-28 VITALS
SYSTOLIC BLOOD PRESSURE: 126 MMHG | HEART RATE: 50 BPM | DIASTOLIC BLOOD PRESSURE: 67 MMHG | RESPIRATION RATE: 18 BRPM | TEMPERATURE: 98.3 F | OXYGEN SATURATION: 96 %

## 2025-03-28 DIAGNOSIS — L97.514 DIABETIC ULCER OF TOE OF RIGHT FOOT ASSOCIATED WITH TYPE 2 DIABETES MELLITUS, WITH NECROSIS OF BONE: ICD-10-CM

## 2025-03-28 DIAGNOSIS — E11.621 DIABETIC ULCER OF TOE OF LEFT FOOT ASSOCIATED WITH TYPE 2 DIABETES MELLITUS, WITH NECROSIS OF BONE: ICD-10-CM

## 2025-03-28 DIAGNOSIS — E11.621 DIABETIC ULCER OF TOE OF RIGHT FOOT ASSOCIATED WITH TYPE 2 DIABETES MELLITUS, WITH NECROSIS OF BONE: ICD-10-CM

## 2025-03-28 DIAGNOSIS — M86.10 ACUTE OSTEOMYELITIS (HCC): Primary | ICD-10-CM

## 2025-03-28 DIAGNOSIS — L97.524 DIABETIC ULCER OF TOE OF LEFT FOOT ASSOCIATED WITH TYPE 2 DIABETES MELLITUS, WITH NECROSIS OF BONE: ICD-10-CM

## 2025-03-28 LAB
GLUCOSE BLD STRIP.AUTO-MCNC: 105 MG/DL (ref 65–100)
GLUCOSE BLD STRIP.AUTO-MCNC: 165 MG/DL (ref 65–100)
SERVICE CMNT-IMP: ABNORMAL
SERVICE CMNT-IMP: ABNORMAL

## 2025-03-28 PROCEDURE — G0277 HBOT, FULL BODY CHAMBER, 30M: HCPCS

## 2025-03-28 PROCEDURE — 82962 GLUCOSE BLOOD TEST: CPT

## 2025-03-28 ASSESSMENT — PAIN SCALES - GENERAL
PAINLEVEL_OUTOF10: 0
PAINLEVEL_OUTOF10: 0

## 2025-03-28 NOTE — PROGRESS NOTES
Care order/instruction    Care order/instruction    Care order/instruction    Notify physician (specify)    Hyperbaric Oxygen Therapy       Physical Exam        General Appearance:  alert and oriented to person, place and time, well-developed and well-nourished, in no acute distress    Pre Tympanic Membrane Assessment:  Right: Normal  Left: Normal    Post Tympanic Membrane Assessment:  Left: Normal  Right: Normal    Pulmonary/Chest:  clear to auscultation bilaterally- no wheezes, rales or rhonchi, normal air movement, no respiratory distress    Cardiovascular:  normal    Plan        Patient placed in a full body Monoplace Chamber #: [id number]  Treatment Start Time: 1034     Pressure Reached Time: 1043  CHRISTOPHER : 2  Number of Air Breaks:        Decompression Time: 1214   Treatment End Time: 1220  Length of Treatment: 90 Minutes  Symptoms Noted During Treatment: None  Total Treatment Time (min): 106    Treatment Completion Status: Treatment completed without issue (d/c instructions reviewed, no questions)    I was present on these premises and immediately available to furnish assistance & direction throughout the HBO Treatment.     Jacqueline Tay is a 74 y.o. female  did successfully complete today's hyperbaric oxygen treatment at [facility] and HBO therapy.    In my clinical judgement, ongoing HBO therapy is  necessary at this time to assist with wound healing, preservation of limb, life, or function.    Supervision and attendance of Hyperbaric Oxygen Therapy provided. Continue HBO treatment as outlined in the treatment plan.    Hyperbaric Oxygen: Ms. Tay tolerated: Treatment Number: 15 without  issue.    Discharge Instructions were explained and given to Ms. Tay     Electronically signed by Thang Larry DO on 3/28/2025 at 12:47 PM

## 2025-03-31 ENCOUNTER — HOSPITAL ENCOUNTER (OUTPATIENT)
Dept: WOUND CARE | Age: 75
Discharge: HOME | End: 2025-03-31
Payer: MEDICARE

## 2025-03-31 VITALS
RESPIRATION RATE: 17 BRPM | SYSTOLIC BLOOD PRESSURE: 139 MMHG | DIASTOLIC BLOOD PRESSURE: 63 MMHG | OXYGEN SATURATION: 100 % | HEART RATE: 55 BPM | TEMPERATURE: 98.8 F

## 2025-03-31 VITALS
SYSTOLIC BLOOD PRESSURE: 139 MMHG | OXYGEN SATURATION: 100 % | RESPIRATION RATE: 17 BRPM | TEMPERATURE: 98.8 F | DIASTOLIC BLOOD PRESSURE: 63 MMHG | HEART RATE: 55 BPM

## 2025-03-31 DIAGNOSIS — E11.621 DIABETIC ULCER OF TOE OF RIGHT FOOT ASSOCIATED WITH TYPE 2 DIABETES MELLITUS, WITH NECROSIS OF BONE: Primary | Chronic | ICD-10-CM

## 2025-03-31 DIAGNOSIS — E11.621 DIABETIC ULCER OF TOE OF LEFT FOOT ASSOCIATED WITH TYPE 2 DIABETES MELLITUS, WITH NECROSIS OF BONE: ICD-10-CM

## 2025-03-31 DIAGNOSIS — E11.42 DIABETIC POLYNEUROPATHY ASSOCIATED WITH TYPE 2 DIABETES MELLITUS: Chronic | ICD-10-CM

## 2025-03-31 DIAGNOSIS — L97.524 DIABETIC ULCER OF TOE OF LEFT FOOT ASSOCIATED WITH TYPE 2 DIABETES MELLITUS, WITH NECROSIS OF BONE: ICD-10-CM

## 2025-03-31 DIAGNOSIS — L97.514 DIABETIC ULCER OF TOE OF RIGHT FOOT ASSOCIATED WITH TYPE 2 DIABETES MELLITUS, WITH NECROSIS OF BONE: ICD-10-CM

## 2025-03-31 DIAGNOSIS — L97.514 DIABETIC ULCER OF TOE OF RIGHT FOOT ASSOCIATED WITH TYPE 2 DIABETES MELLITUS, WITH NECROSIS OF BONE: Primary | Chronic | ICD-10-CM

## 2025-03-31 DIAGNOSIS — E11.621 DIABETIC ULCER OF TOE OF RIGHT FOOT ASSOCIATED WITH TYPE 2 DIABETES MELLITUS, WITH NECROSIS OF BONE: ICD-10-CM

## 2025-03-31 DIAGNOSIS — M86.10 ACUTE OSTEOMYELITIS (HCC): Primary | ICD-10-CM

## 2025-03-31 LAB
GLUCOSE BLD STRIP.AUTO-MCNC: 124 MG/DL (ref 65–100)
GLUCOSE BLD STRIP.AUTO-MCNC: 128 MG/DL (ref 65–100)
GLUCOSE BLD STRIP.AUTO-MCNC: 137 MG/DL (ref 65–100)
SERVICE CMNT-IMP: ABNORMAL

## 2025-03-31 PROCEDURE — 99183 HYPERBARIC OXYGEN THERAPY: CPT | Performed by: FAMILY MEDICINE

## 2025-03-31 PROCEDURE — 82962 GLUCOSE BLOOD TEST: CPT

## 2025-03-31 PROCEDURE — 11042 DBRDMT SUBQ TIS 1ST 20SQCM/<: CPT

## 2025-03-31 PROCEDURE — G0277 HBOT, FULL BODY CHAMBER, 30M: HCPCS

## 2025-03-31 RX ORDER — GENTAMICIN SULFATE 1 MG/G
OINTMENT TOPICAL ONCE
OUTPATIENT
Start: 2025-03-31 | End: 2025-03-31

## 2025-03-31 RX ORDER — GINSENG 100 MG
CAPSULE ORAL ONCE
OUTPATIENT
Start: 2025-03-31 | End: 2025-03-31

## 2025-03-31 RX ORDER — SILVER SULFADIAZINE 10 MG/G
CREAM TOPICAL ONCE
OUTPATIENT
Start: 2025-03-31 | End: 2025-03-31

## 2025-03-31 RX ORDER — TRIAMCINOLONE ACETONIDE 1 MG/G
OINTMENT TOPICAL ONCE
OUTPATIENT
Start: 2025-03-31 | End: 2025-03-31

## 2025-03-31 RX ORDER — CLOBETASOL PROPIONATE 0.5 MG/G
OINTMENT TOPICAL ONCE
OUTPATIENT
Start: 2025-03-31 | End: 2025-03-31

## 2025-03-31 RX ORDER — SODIUM CHLOR/HYPOCHLOROUS ACID 0.033 %
SOLUTION, IRRIGATION IRRIGATION ONCE
OUTPATIENT
Start: 2025-03-31 | End: 2025-03-31

## 2025-03-31 RX ORDER — BETAMETHASONE DIPROPIONATE 0.5 MG/G
CREAM TOPICAL ONCE
OUTPATIENT
Start: 2025-03-31 | End: 2025-03-31

## 2025-03-31 RX ORDER — NEOMYCIN/BACITRACIN/POLYMYXINB 3.5-400-5K
OINTMENT (GRAM) TOPICAL ONCE
OUTPATIENT
Start: 2025-03-31 | End: 2025-03-31

## 2025-03-31 RX ORDER — BACITRACIN ZINC AND POLYMYXIN B SULFATE 500; 1000 [USP'U]/G; [USP'U]/G
OINTMENT TOPICAL ONCE
OUTPATIENT
Start: 2025-03-31 | End: 2025-03-31

## 2025-03-31 RX ORDER — MUPIROCIN 20 MG/G
OINTMENT TOPICAL ONCE
OUTPATIENT
Start: 2025-03-31 | End: 2025-03-31

## 2025-03-31 ASSESSMENT — PAIN SCALES - GENERAL
PAINLEVEL_OUTOF10: 0
PAINLEVEL_OUTOF10: 0

## 2025-03-31 NOTE — DISCHARGE INSTRUCTIONS
Right 3rd Toe:  Cleanse with normal saline  Allow Vashe Wound Solution moistened gauze to sit on wound bed for 60 seconds  Apply Xeroform to wound bed  Secure with rolled gauze and tape  Change 3 times a week.        Left Great Toe:  Apply Vashe wound solution to wound bed.   Apply Xeroform over wound bed.   Cover with gauze and rolled gauze.  Apply TCC EZ 3\" (Cast) to be applied on left foot weekly.      Wear tubigrip from knees to toes on bilateral legs.      Make Appt with [facility] for custom inserts & order shoes online.  Continue HBO.  Continue taking antibiotics as prescribed.      Patient to offload wound with DARCO SHOE WITH PEG ASSIST INSERT while standing or weight bearing.     Elevate legs when sitting.  Avoid prolonged standing or sitting with legs in dependent position.  Increase protein intake to promote wound healing. Brandon and Ensure are examples of protein supplements.  Wound healing is greatly slowed when glucose levels are greater than 200. Monitor glucose levels to ensure tight glucose control.     Protein:   Egg ~ 6g  Yogurt ~ 15g  Half cup of cottage cheese ~ 15g  Chicken breast ~ 30g

## 2025-03-31 NOTE — PROGRESS NOTES
[facility]  Procedure Note    Jacqueline Tay  MEDICAL RECORD NUMBER: [id number]  AGE: 74 y.o.     GENDER: female    : 1950  EPISODE DATE: 3/31/2025    Problem List Items Addressed This Visit          Endocrine    * (Principal) Diabetic ulcer of toe of right foot associated with type 2 diabetes mellitus, with necrosis of bone (HCC) - Primary (Chronic)      Assessment  Right 3rd toe wound size is unchanged but depth is improved  Remains on Keflex  She is tolerating HBOT  Plan  Excisional debridement to remove devitalized tissue and promote wound healing   Continue Vashe, Hydrofera blue, Darco to right foot; change 3x weekly  Continue HBOT and abx  RTC 1 week         Relevant Orders    Initiate Outpatient Wound Care Protocol    Diabetic polyneuropathy associated with type 2 diabetes mellitus (HCC) (Chronic)    Relevant Orders    Initiate Outpatient Wound Care Protocol    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  Performed by: DORON Patrick NP  Consent obtained: Yes  Time out taken: Yes  Pain control:     Wound #: 1  Diabetic/pressure/chronic non-pressure ulcers only: diabetic ulcer, fat layer exposed was/were debrided using curette and tissue nippers. The wound(s) was/were debrided down through/to subcutaneous tissue. Devitalized tissue debrided: biofilm, exudate, and callus. Pre and

## 2025-03-31 NOTE — ASSESSMENT & PLAN NOTE
Assessment  Right 3rd toe wound size is unchanged but depth is improved  Remains on Keflex  She is tolerating HBOT  Plan  Excisional debridement to remove devitalized tissue and promote wound healing   Continue Vashe, Hydrofera blue, Darco to right foot; change 3x weekly  Continue HBOT and abx  RTC 1 week

## 2025-03-31 NOTE — FLOWSHEET NOTE
03/31/25 1418   Wound 01/14/25 Toe (Comment  which one) Right #1 3rd toe   Date First Assessed/Time First Assessed: 01/14/25 1038   Present on Original Admission: Yes  Wound Approximate Age at First Assessment (Weeks): 50 weeks  Primary Wound Type: Diabetic Ulcer  Location: (c) Toe (Comment  which one)  Wound Location [place]...   Wound Image     Wound Etiology Diabetic Mota 3   Dressing Status Old drainage noted   Wound Cleansed Cleansed with saline   Dressing/Treatment Hydrofera blue   Offloading for Diabetic Foot Ulcers Offloading ordered   Wound Length (cm) 0.5 cm   Wound Width (cm) 0.4 cm   Wound Depth (cm) 0.1 cm   Wound Surface Area (cm^2) 0.2 cm^2   Change in Wound Size % (l*w) -122.22   Wound Volume (cm^3) 0.02 cm^3   Wound Healing % 26   Post-Procedure Length (cm) 0.5 cm   Post-Procedure Width (cm) 0.5 cm   Post-Procedure Depth (cm) 0.1 cm   Post-Procedure Surface Area (cm^2) 0.25 cm^2   Post-Procedure Volume (cm^3) 0.025 cm^3   Wound Assessment Pink/red   Drainage Amount Small (< 25%)   Drainage Description Serosanguinous   Odor None   Jennifer-wound Assessment Hyperkeratosis (callous)   Wound Thickness Description not for Pressure Injury Full thickness   Wound 01/14/25 Toe (Comment  which one) Left #2 Left Great toe   Date First Assessed/Time First Assessed: 01/14/25 1038   Present on Original Admission: Yes  Wound Approximate Age at First Assessment (Weeks): 50 weeks  Primary Wound Type: Diabetic Ulcer  Location: (c) Toe (Comment  which one)  Wound Location [place]...   Wound Image     Wound Etiology Diabetic Mota 3   Dressing Status Old drainage noted   Wound Cleansed Cleansed with saline   Dressing/Treatment Xeroform   Offloading for Diabetic Foot Ulcers Total contact cast   Wound Length (cm) 0.1 cm   Wound Width (cm) 0.1 cm   Wound Depth (cm) 0.1 cm   Wound Surface Area (cm^2) 0.01 cm^2   Change in Wound Size % (l*w) 93.33   Wound Volume (cm^3) 0.001 cm^3   Wound Healing % 99   Post-Procedure Length

## 2025-03-31 NOTE — WOUND CARE
Total Contact Cast    NAME:  Jacqueline Tay  YOB: 1950  MEDICAL RECORD NUMBER:  [id number]  DATE:  3/31/2025    Goal:  Patient will maintain integrity of cast, avoid mobility hazards, and report complications that may occur (foul odor, pain, numbness, cracked cast).    Removed old contact cast if indicated and wash extremity with soap and water  Applied ordered dressing over wound(s)   Applied cast padding  Applied foam padding  Applied per Orquidea Cabral NP  Total Contact Cast was applied in the Wound Care Center to left lower leg  Applied cast material fiberglass  Allow cast to dry   Instructed patient to report to health care provider, including wound care center, any back pain, hip pain, or leg pain, numbness of toes, or any odor coming from the cast.   Instructed patient not to stick any foreign objects down into cast.  Instructed patient to utilize assistive devices(crutches, cane or walker) as ordered.  Instructed patient to continue offloading as directed.     Applied cast per  Guidelines    Electronically signed by Hollie BHAT RN on 3/31/2025 at 2:23 PM

## 2025-03-31 NOTE — WOUND CARE
Discharge Instructions for  [facility]  [address]  [address] [place] [address]  Phone [phone]   Fax [phone]      NAME:  Jacqueline Tay  YOB: 1950  MEDICAL RECORD NUMBER:  [id number]  DATE:  3/31/2025    Return Appointment:   1 week with Orquidea Cabral NP      Instructions:   Right 3rd Toe:  Cleanse with normal saline  Allow Vashe Wound Solution moistened gauze to sit on wound bed for 60 seconds  Apply Xeroform to wound bed  Secure with rolled gauze and tape  Change 3 times a week.        Left Great Toe:  Apply Vashe wound solution to wound bed.   Apply Xeroform over wound bed.   Cover with gauze and rolled gauze.  Apply TCC EZ 3\" (Cast) to be applied on left foot weekly.      Wear tubigrip from knees to toes on bilateral legs.      Make Appt with [facility] for custom inserts & order shoes online.  Continue HBO.  Continue taking antibiotics as prescribed.      Patient to offload wound with DARCO SHOE WITH PEG ASSIST INSERT while standing or weight bearing.     Elevate legs when sitting.  Avoid prolonged standing or sitting with legs in dependent position.  Increase protein intake to promote wound healing. Brandon and Ensure are examples of protein supplements.  Wound healing is greatly slowed when glucose levels are greater than 200. Monitor glucose levels to ensure tight glucose control.     Protein:   Egg ~ 6g  Yogurt ~ 15g  Half cup of cottage cheese ~ 15g  Chicken breast ~ 30g        Should you experience increased redness, swelling, pain, foul odor, size of wound(s), or have a temperature over 101 degrees please contact the Wound Healing Center at [phone] or if after hours contact your primary care physician or go to the hospital emergency department.    PLEASE NOTE: IF YOU ARE UNABLE TO OBTAIN WOUND SUPPLIES, CONTINUE TO USE THE SUPPLIES YOU HAVE AVAILABLE UNTIL YOU ARE ABLE TO REACH US. IT IS MOST IMPORTANT TO KEEP THE

## 2025-03-31 NOTE — ASSESSMENT & PLAN NOTE
Assessment  Heavy callus but no open wound to left great toe  Remains on Keflex  She is tolerating HBOT  Plan  No open wound after callus pared  She is agreeable to continue TCC; she will make an appt to get fitted for custom insoles  Continue Vashe, Xeroform gauze, TCC; change weekly  Continue HBOT and abx  RTC 1 week

## 2025-04-01 ENCOUNTER — HOSPITAL ENCOUNTER (OUTPATIENT)
Dept: WOUND CARE | Age: 75
Discharge: HOME | End: 2025-04-01
Payer: MEDICARE

## 2025-04-01 VITALS
HEART RATE: 51 BPM | DIASTOLIC BLOOD PRESSURE: 66 MMHG | OXYGEN SATURATION: 97 % | RESPIRATION RATE: 17 BRPM | TEMPERATURE: 97.7 F | SYSTOLIC BLOOD PRESSURE: 150 MMHG

## 2025-04-01 DIAGNOSIS — E11.621 DIABETIC ULCER OF TOE OF LEFT FOOT ASSOCIATED WITH TYPE 2 DIABETES MELLITUS, WITH NECROSIS OF BONE: ICD-10-CM

## 2025-04-01 DIAGNOSIS — E11.621 DIABETIC ULCER OF TOE OF RIGHT FOOT ASSOCIATED WITH TYPE 2 DIABETES MELLITUS, WITH NECROSIS OF BONE: ICD-10-CM

## 2025-04-01 DIAGNOSIS — L97.514 DIABETIC ULCER OF TOE OF RIGHT FOOT ASSOCIATED WITH TYPE 2 DIABETES MELLITUS, WITH NECROSIS OF BONE: ICD-10-CM

## 2025-04-01 DIAGNOSIS — L97.524 DIABETIC ULCER OF TOE OF LEFT FOOT ASSOCIATED WITH TYPE 2 DIABETES MELLITUS, WITH NECROSIS OF BONE: ICD-10-CM

## 2025-04-01 DIAGNOSIS — M86.10 ACUTE OSTEOMYELITIS (HCC): Primary | ICD-10-CM

## 2025-04-01 LAB
GLUCOSE BLD STRIP.AUTO-MCNC: 119 MG/DL (ref 65–100)
GLUCOSE BLD STRIP.AUTO-MCNC: 151 MG/DL (ref 65–100)
SERVICE CMNT-IMP: ABNORMAL
SERVICE CMNT-IMP: ABNORMAL

## 2025-04-01 PROCEDURE — 99183 HYPERBARIC OXYGEN THERAPY: CPT | Performed by: FAMILY MEDICINE

## 2025-04-01 PROCEDURE — 82962 GLUCOSE BLOOD TEST: CPT

## 2025-04-01 PROCEDURE — G0277 HBOT, FULL BODY CHAMBER, 30M: HCPCS

## 2025-04-01 ASSESSMENT — PAIN SCALES - GENERAL: PAINLEVEL_OUTOF10: 0

## 2025-04-01 NOTE — PROGRESS NOTES
HBO PROGRESS NOTE      NAME: Jacqueline Tay  MEDICAL RECORD NUMBER:  [id number]  AGE: 74 y.o.   GENDER: female  : 1950  EPISODE DATE:  2025     Subjective     HBO Treatment Number: 17 out of Total Treatments: 40    HBO Diagnosis:             Indications: Lower Extremity Diabetic Wound ___(site) (Left Foot)    Safety checks performed prior to treatment.  See doc flowsheets for documentation.    Objective        Recent Labs     25  1032 25  1225   POCGLU 151* 119*     Pre treatment Vital Signs       Temp: 97.7 °F (36.5 °C)     Pulse: 51     Respirations: 18     BP: (!) 127/49       Post treatment Vital Signs  Temp: 97.7 °F (36.5 °C)  Pulse: 51  Respirations: 17  BP: (!) 150/66    Assessment        HBO Diagnosis:   Problem List Items Addressed This Visit          Endocrine    * (Principal) Diabetic ulcer of toe of right foot associated with type 2 diabetes mellitus, with necrosis of bone (Chronic)    Relevant Orders    Notify physician (specify)    Hyperbaric Oxygen Therapy    Hypoglycemial protocol    POCT glucose    Care order/instruction    Care order/instruction    Care order/instruction    Care order/instruction    Care order/instruction    Care order/instruction    Care order/instruction    Care order/instruction    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 order/instruction

## 2025-04-01 NOTE — PROGRESS NOTES
[facility]  Utilization Review Note    Jacqueline Tay  MEDICAL RECORD NUMBER: [id number]  AGE: 74 y.o.     GENDER: female    : 1950  EPISODE DATE: 3/31/2025    HBO Diagnosis:     Problem List Items Addressed This Visit          Endocrine    * (Principal) Diabetic ulcer of toe of right foot associated with type 2 diabetes mellitus, with necrosis of bone - Primary (Chronic)      Assessment  Right 3rd toe wound size is unchanged but depth is improved  Remains on Keflex  She is tolerating HBOT  Plan  Excisional debridement to remove devitalized tissue and promote wound healing   Continue Vashe, Hydrofera blue, Darco to right foot; change 3x weekly  Continue HBOT and abx  RTC 1 week         Diabetic polyneuropathy associated with type 2 diabetes mellitus (Chronic)    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 female who is currently receiving hyperbaric oxygen therapy at [facility].    Ms. Tay has currently received: 16 out of 40 treatments.    In my clinical opinion, ongoing HBO therapy is necessary at this time. Ms. Tay has shown improvement with hyperbaric oxygen therapy as evidenced by wound healing.            She verbalized understanding of the Utilization Review for HBOT and has agreed to the plan.     Electronically signed by DORON Patrick NP on 3/31/2025 at 1:23 PM.

## 2025-04-02 ENCOUNTER — TELEPHONE (OUTPATIENT)
Dept: FAMILY MEDICINE CLINIC | Facility: CLINIC | Age: 75
End: 2025-04-02

## 2025-04-02 ENCOUNTER — HOSPITAL ENCOUNTER (OUTPATIENT)
Dept: WOUND CARE | Age: 75
Discharge: HOME | End: 2025-04-02
Payer: MEDICARE

## 2025-04-02 VITALS
SYSTOLIC BLOOD PRESSURE: 134 MMHG | HEART RATE: 55 BPM | OXYGEN SATURATION: 95 % | DIASTOLIC BLOOD PRESSURE: 67 MMHG | TEMPERATURE: 98.3 F | RESPIRATION RATE: 18 BRPM

## 2025-04-02 DIAGNOSIS — L97.524 DIABETIC ULCER OF TOE OF LEFT FOOT ASSOCIATED WITH TYPE 2 DIABETES MELLITUS, WITH NECROSIS OF BONE: ICD-10-CM

## 2025-04-02 DIAGNOSIS — E11.621 DIABETIC ULCER OF TOE OF RIGHT FOOT ASSOCIATED WITH TYPE 2 DIABETES MELLITUS, WITH NECROSIS OF BONE: ICD-10-CM

## 2025-04-02 DIAGNOSIS — E11.621 DIABETIC ULCER OF TOE OF LEFT FOOT ASSOCIATED WITH TYPE 2 DIABETES MELLITUS, WITH NECROSIS OF BONE: ICD-10-CM

## 2025-04-02 DIAGNOSIS — L97.514 DIABETIC ULCER OF TOE OF RIGHT FOOT ASSOCIATED WITH TYPE 2 DIABETES MELLITUS, WITH NECROSIS OF BONE: ICD-10-CM

## 2025-04-02 DIAGNOSIS — M86.10 ACUTE OSTEOMYELITIS (HCC): Primary | ICD-10-CM

## 2025-04-02 LAB
GLUCOSE BLD STRIP.AUTO-MCNC: 119 MG/DL (ref 65–100)
GLUCOSE BLD STRIP.AUTO-MCNC: 128 MG/DL (ref 65–100)
SERVICE CMNT-IMP: ABNORMAL
SERVICE CMNT-IMP: ABNORMAL

## 2025-04-02 PROCEDURE — 99183 HYPERBARIC OXYGEN THERAPY: CPT | Performed by: FAMILY MEDICINE

## 2025-04-02 PROCEDURE — G0277 HBOT, FULL BODY CHAMBER, 30M: HCPCS

## 2025-04-02 PROCEDURE — 82962 GLUCOSE BLOOD TEST: CPT

## 2025-04-02 ASSESSMENT — PAIN SCALES - GENERAL
PAINLEVEL_OUTOF10: 0
PAINLEVEL_OUTOF10: 0

## 2025-04-02 NOTE — PROGRESS NOTES
HBO PROGRESS NOTE      NAME: Jacqueline Tay  MEDICAL RECORD NUMBER:  [id number]  AGE: 74 y.o.   GENDER: female  : 1950  EPISODE DATE:  2025     Subjective     HBO Treatment Number: 18 out of Total Treatments: 40    HBO Diagnosis:             Indications: Lower Extremity Diabetic Wound ___(site) (Left Foot)    Safety checks performed prior to treatment.  See doc flowsheets for documentation.    Objective        Recent Labs     25  1036 25  1235   POCGLU 128* 119*     Pre treatment Vital Signs       Temp: 98.3 °F (36.8 °C)     Pulse: 55     Respirations: 17     BP: 133/71       Post treatment Vital Signs  Temp: 98.3 °F (36.8 °C)  Pulse: 55  Respirations: 18  BP: 134/67    Assessment        HBO Diagnosis:   Problem List Items Addressed This Visit          Endocrine    * (Principal) Diabetic ulcer of toe of right foot associated with type 2 diabetes mellitus, with necrosis of bone (Chronic)    Relevant Orders    Notify physician (specify)    Hyperbaric Oxygen Therapy    Hypoglycemial protocol    POCT glucose    Care order/instruction    Care order/instruction    Care order/instruction    Care order/instruction    Care order/instruction    Care order/instruction    Care order/instruction    Care order/instruction    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 order/instruction    Care

## 2025-04-03 ENCOUNTER — HOSPITAL ENCOUNTER (OUTPATIENT)
Dept: WOUND CARE | Age: 75
Discharge: HOME | End: 2025-04-03
Payer: MEDICARE

## 2025-04-03 VITALS
SYSTOLIC BLOOD PRESSURE: 133 MMHG | DIASTOLIC BLOOD PRESSURE: 61 MMHG | HEART RATE: 48 BPM | RESPIRATION RATE: 18 BRPM | OXYGEN SATURATION: 96 % | TEMPERATURE: 97.7 F

## 2025-04-03 DIAGNOSIS — E11.621 DIABETIC ULCER OF TOE OF LEFT FOOT ASSOCIATED WITH TYPE 2 DIABETES MELLITUS, WITH NECROSIS OF BONE: ICD-10-CM

## 2025-04-03 DIAGNOSIS — L97.514 DIABETIC ULCER OF TOE OF RIGHT FOOT ASSOCIATED WITH TYPE 2 DIABETES MELLITUS, WITH NECROSIS OF BONE: ICD-10-CM

## 2025-04-03 DIAGNOSIS — L97.524 DIABETIC ULCER OF TOE OF LEFT FOOT ASSOCIATED WITH TYPE 2 DIABETES MELLITUS, WITH NECROSIS OF BONE: ICD-10-CM

## 2025-04-03 DIAGNOSIS — E11.621 DIABETIC ULCER OF TOE OF RIGHT FOOT ASSOCIATED WITH TYPE 2 DIABETES MELLITUS, WITH NECROSIS OF BONE: ICD-10-CM

## 2025-04-03 DIAGNOSIS — M86.10 ACUTE OSTEOMYELITIS (HCC): Primary | ICD-10-CM

## 2025-04-03 LAB
GLUCOSE BLD STRIP.AUTO-MCNC: 110 MG/DL (ref 65–100)
GLUCOSE BLD STRIP.AUTO-MCNC: 142 MG/DL (ref 65–100)
SERVICE CMNT-IMP: ABNORMAL
SERVICE CMNT-IMP: ABNORMAL

## 2025-04-03 PROCEDURE — 82962 GLUCOSE BLOOD TEST: CPT

## 2025-04-03 PROCEDURE — 99183 HYPERBARIC OXYGEN THERAPY: CPT | Performed by: FAMILY MEDICINE

## 2025-04-03 PROCEDURE — G0277 HBOT, FULL BODY CHAMBER, 30M: HCPCS

## 2025-04-03 ASSESSMENT — PAIN SCALES - GENERAL
PAINLEVEL_OUTOF10: 0
PAINLEVEL_OUTOF10: 0

## 2025-04-03 NOTE — PROGRESS NOTES
HBO PROGRESS NOTE      NAME: Jacqueline Tay  MEDICAL RECORD NUMBER:  [id number]  AGE: 74 y.o.   GENDER: female  : 1950  EPISODE DATE:  4/3/2025     Subjective     HBO Treatment Number: 19 out of Total Treatments: 40    HBO Diagnosis:             Indications: Lower Extremity Diabetic Wound ___(site) (Left Foot)    Safety checks performed prior to treatment.  See doc flowsheets for documentation.    Objective        Recent Labs     25  1017 25  1209   POCGLU 110* 142*     Pre treatment Vital Signs       Temp: 97.7 °F (36.5 °C)     Pulse: 53     Respirations: 18     BP: (!) 125/53       Post treatment Vital Signs  Temp: 97.7 °F (36.5 °C)  Pulse: (!) 48  Respirations: 18  BP: 133/61    Assessment        HBO Diagnosis:   Problem List Items Addressed This Visit          Endocrine    * (Principal) Diabetic ulcer of toe of right foot associated with type 2 diabetes mellitus, with necrosis of bone (Chronic)    Relevant Orders    Notify physician (specify)    Hyperbaric Oxygen Therapy    Hypoglycemial protocol    POCT glucose    Care order/instruction    Care order/instruction    Care order/instruction    Care order/instruction    Care order/instruction    Care order/instruction    Care order/instruction    Care order/instruction    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 order/instruction

## 2025-04-04 NOTE — TELEPHONE ENCOUNTER
CMN received from [facility], needs completed and faxed back. Placed in nurse box.   
Orders/ paperwork in CMS office to sign.     
Pt is having hyperbaric treatments for a wound on her L great toe and they don't want to remove her cast until she has orthotics. Needs these forms completed and faxed as soon as possible, please.  
No

## 2025-04-07 ENCOUNTER — HOSPITAL ENCOUNTER (OUTPATIENT)
Dept: WOUND CARE | Age: 75
Discharge: HOME | End: 2025-04-07
Payer: MEDICARE

## 2025-04-07 VITALS
HEART RATE: 58 BPM | OXYGEN SATURATION: 97 % | TEMPERATURE: 97.9 F | SYSTOLIC BLOOD PRESSURE: 147 MMHG | HEART RATE: 58 BPM | DIASTOLIC BLOOD PRESSURE: 70 MMHG | RESPIRATION RATE: 18 BRPM | RESPIRATION RATE: 18 BRPM | TEMPERATURE: 97.9 F | OXYGEN SATURATION: 97 % | DIASTOLIC BLOOD PRESSURE: 70 MMHG | SYSTOLIC BLOOD PRESSURE: 147 MMHG

## 2025-04-07 DIAGNOSIS — L97.514 DIABETIC ULCER OF TOE OF RIGHT FOOT ASSOCIATED WITH TYPE 2 DIABETES MELLITUS, WITH NECROSIS OF BONE: ICD-10-CM

## 2025-04-07 DIAGNOSIS — E11.621 DIABETIC ULCER OF TOE OF RIGHT FOOT ASSOCIATED WITH TYPE 2 DIABETES MELLITUS, WITH NECROSIS OF BONE: ICD-10-CM

## 2025-04-07 DIAGNOSIS — M86.10 ACUTE OSTEOMYELITIS (HCC): Primary | ICD-10-CM

## 2025-04-07 DIAGNOSIS — E11.621 DIABETIC ULCER OF TOE OF LEFT FOOT ASSOCIATED WITH TYPE 2 DIABETES MELLITUS, WITH NECROSIS OF BONE: ICD-10-CM

## 2025-04-07 DIAGNOSIS — E11.42 DIABETIC POLYNEUROPATHY ASSOCIATED WITH TYPE 2 DIABETES MELLITUS: Primary | ICD-10-CM

## 2025-04-07 DIAGNOSIS — L97.524 DIABETIC ULCER OF TOE OF LEFT FOOT ASSOCIATED WITH TYPE 2 DIABETES MELLITUS, WITH NECROSIS OF BONE: ICD-10-CM

## 2025-04-07 LAB
GLUCOSE BLD STRIP.AUTO-MCNC: 125 MG/DL (ref 65–100)
GLUCOSE BLD STRIP.AUTO-MCNC: 159 MG/DL (ref 65–100)
SERVICE CMNT-IMP: ABNORMAL
SERVICE CMNT-IMP: ABNORMAL

## 2025-04-07 PROCEDURE — 82962 GLUCOSE BLOOD TEST: CPT

## 2025-04-07 PROCEDURE — 11042 DBRDMT SUBQ TIS 1ST 20SQCM/<: CPT

## 2025-04-07 PROCEDURE — 99183 HYPERBARIC OXYGEN THERAPY: CPT | Performed by: FAMILY MEDICINE

## 2025-04-07 PROCEDURE — G0277 HBOT, FULL BODY CHAMBER, 30M: HCPCS

## 2025-04-07 RX ORDER — SILVER SULFADIAZINE 10 MG/G
CREAM TOPICAL ONCE
OUTPATIENT
Start: 2025-04-07 | End: 2025-04-07

## 2025-04-07 RX ORDER — BETAMETHASONE DIPROPIONATE 0.5 MG/G
CREAM TOPICAL ONCE
OUTPATIENT
Start: 2025-04-07 | End: 2025-04-07

## 2025-04-07 RX ORDER — CLOBETASOL PROPIONATE 0.5 MG/G
OINTMENT TOPICAL ONCE
OUTPATIENT
Start: 2025-04-07 | End: 2025-04-07

## 2025-04-07 RX ORDER — BACITRACIN ZINC AND POLYMYXIN B SULFATE 500; 1000 [USP'U]/G; [USP'U]/G
OINTMENT TOPICAL ONCE
OUTPATIENT
Start: 2025-04-07 | End: 2025-04-07

## 2025-04-07 RX ORDER — MUPIROCIN 20 MG/G
OINTMENT TOPICAL ONCE
OUTPATIENT
Start: 2025-04-07 | End: 2025-04-07

## 2025-04-07 RX ORDER — GINSENG 100 MG
CAPSULE ORAL ONCE
OUTPATIENT
Start: 2025-04-07 | End: 2025-04-07

## 2025-04-07 RX ORDER — SODIUM CHLOR/HYPOCHLOROUS ACID 0.033 %
SOLUTION, IRRIGATION IRRIGATION ONCE
OUTPATIENT
Start: 2025-04-07 | End: 2025-04-07

## 2025-04-07 RX ORDER — NEOMYCIN/BACITRACIN/POLYMYXINB 3.5-400-5K
OINTMENT (GRAM) TOPICAL ONCE
OUTPATIENT
Start: 2025-04-07 | End: 2025-04-07

## 2025-04-07 RX ORDER — SODIUM CHLOR/HYPOCHLOROUS ACID 0.033 %
SOLUTION, IRRIGATION IRRIGATION ONCE
Status: COMPLETED | OUTPATIENT
Start: 2025-04-07 | End: 2025-04-07

## 2025-04-07 RX ORDER — GENTAMICIN SULFATE 1 MG/G
OINTMENT TOPICAL ONCE
OUTPATIENT
Start: 2025-04-07 | End: 2025-04-07

## 2025-04-07 RX ORDER — TRIAMCINOLONE ACETONIDE 1 MG/G
OINTMENT TOPICAL ONCE
OUTPATIENT
Start: 2025-04-07 | End: 2025-04-07

## 2025-04-07 RX ADMIN — Medication: at 14:03

## 2025-04-07 ASSESSMENT — PAIN SCALES - GENERAL
PAINLEVEL_OUTOF10: 0

## 2025-04-07 NOTE — FLOWSHEET NOTE
Dressing/Treatment Xeroform   Offloading for Diabetic Foot Ulcers Total contact cast   Wound Length (cm) 0 cm   Wound Width (cm) 0 cm   Wound Depth (cm) 0 cm   Wound Surface Area (cm^2) 0 cm^2   Change in Wound Size % (l*w) 100   Wound Volume (cm^3) 0 cm^3   Wound Healing % 100   Wound Assessment Epithelialization   Drainage Amount None (dry)   Pain Assessment   Pain Assessment None - Denies Pain   Pain Level 0     Patient taking Eliquis

## 2025-04-07 NOTE — WOUND CARE
Total Contact Cast    NAME:  Jacqueline Tay  YOB: 1950  MEDICAL RECORD NUMBER:  [id number]  DATE:  4/7/2025    Goal:  Patient will maintain integrity of cast, avoid mobility hazards, and report complications that may occur (foul odor, pain, numbness, cracked cast).    Removed old contact cast if indicated and wash extremity with soap and water.  Applied ordered dressing.  Applied foam padding  Applied cast padding included in the kit   Applied per nursing and Thang Larry DO  Applied to: [x] Left Lower Leg [] Right Lower Leg  Allow cast to dry.   Instructed patient to report to health care provider, including wound care center, any back pain, hip pain, or leg pain, numbness of toes, or any odor coming from the cast.   Instructed patient not to stick any foreign objects down into cast.   Instructed patient to utilize assistive devices(crutches, cane or walker) as ordered   Instructed patient to continue offloading as directed.     Applied cast per  Guidelines    Electronically signed by JOHAN PAULA RN on 4/7/2025 at 2:13 PM    
SUPPLIES YOU HAVE AVAILABLE UNTIL YOU ARE ABLE TO REACH US. IT IS MOST IMPORTANT TO KEEP THE WOUND COVERED AT ALL TIMES.    Electronically signed JOHAN PAULA RN on 4/7/2025 at 1:20 PM

## 2025-04-08 ENCOUNTER — HOSPITAL ENCOUNTER (OUTPATIENT)
Dept: WOUND CARE | Age: 75
Discharge: HOME | End: 2025-04-08
Payer: MEDICARE

## 2025-04-08 VITALS
HEART RATE: 45 BPM | TEMPERATURE: 97.3 F | OXYGEN SATURATION: 98 % | SYSTOLIC BLOOD PRESSURE: 129 MMHG | DIASTOLIC BLOOD PRESSURE: 52 MMHG | RESPIRATION RATE: 18 BRPM

## 2025-04-08 DIAGNOSIS — E11.621 DIABETIC ULCER OF TOE OF LEFT FOOT ASSOCIATED WITH TYPE 2 DIABETES MELLITUS, WITH NECROSIS OF BONE: ICD-10-CM

## 2025-04-08 DIAGNOSIS — E11.621 DIABETIC ULCER OF TOE OF RIGHT FOOT ASSOCIATED WITH TYPE 2 DIABETES MELLITUS, WITH NECROSIS OF BONE: ICD-10-CM

## 2025-04-08 DIAGNOSIS — M86.10 ACUTE OSTEOMYELITIS (HCC): Primary | ICD-10-CM

## 2025-04-08 DIAGNOSIS — L97.524 DIABETIC ULCER OF TOE OF LEFT FOOT ASSOCIATED WITH TYPE 2 DIABETES MELLITUS, WITH NECROSIS OF BONE: ICD-10-CM

## 2025-04-08 DIAGNOSIS — L97.514 DIABETIC ULCER OF TOE OF RIGHT FOOT ASSOCIATED WITH TYPE 2 DIABETES MELLITUS, WITH NECROSIS OF BONE: ICD-10-CM

## 2025-04-08 LAB
GLUCOSE BLD STRIP.AUTO-MCNC: 120 MG/DL (ref 65–100)
GLUCOSE BLD STRIP.AUTO-MCNC: 156 MG/DL (ref 65–100)
SERVICE CMNT-IMP: ABNORMAL
SERVICE CMNT-IMP: ABNORMAL

## 2025-04-08 PROCEDURE — 82962 GLUCOSE BLOOD TEST: CPT

## 2025-04-08 PROCEDURE — 99202 OFFICE O/P NEW SF 15 MIN: CPT

## 2025-04-08 PROCEDURE — G0277 HBOT, FULL BODY CHAMBER, 30M: HCPCS

## 2025-04-08 ASSESSMENT — PAIN SCALES - GENERAL: PAINLEVEL_OUTOF10: 0

## 2025-04-08 NOTE — PROGRESS NOTES
HBO PROGRESS NOTE      NAME: Jacqueline Tay  MEDICAL RECORD NUMBER:  [id number]  AGE: 74 y.o.   GENDER: female  : 1950  EPISODE DATE:  2025     Subjective     HBO Treatment Number: 20 out of Total Treatments: 40    HBO Diagnosis:             Indications: Lower Extremity Diabetic Wound ___(site) (left foot)    Safety checks performed prior to treatment.  See doc flowsheets for documentation.    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)    Relevant Orders    POCT glucose       Musculoskeletal and Integument    Acute osteomyelitis (HCC) - Primary (Chronic)    Relevant Orders    POCT glucose       Physical Exam        General Appearance:  alert and oriented to person, place and time, well-developed and well-nourished, in no acute distress    Pre Tympanic Membrane Assessment:  Right: Normal  Left: Normal    Post Tympanic Membrane Assessment:  Left: Normal  Right: Normal    Pulmonary/Chest:  clear to auscultation bilaterally- no wheezes, rales or rhonchi, normal air movement, no respiratory distress    Cardiovascular:  normal    Plan        Patient placed in a full body Monoplace Chamber #: [id number]  Treatment Start Time: 1044     Pressure Reached Time: 1053  CHRISTOPHER : 2  Number of Air Breaks:        Decompression Time: 1224   Treatment End Time: 1231  Length of Treatment: 90 Minutes  Symptoms Noted During Treatment: None  Total

## 2025-04-09 ENCOUNTER — HOSPITAL ENCOUNTER (OUTPATIENT)
Dept: WOUND CARE | Age: 75
Discharge: HOME | End: 2025-04-09
Payer: MEDICARE

## 2025-04-09 VITALS
HEART RATE: 43 BPM | DIASTOLIC BLOOD PRESSURE: 61 MMHG | OXYGEN SATURATION: 98 % | SYSTOLIC BLOOD PRESSURE: 132 MMHG | TEMPERATURE: 97.6 F | RESPIRATION RATE: 18 BRPM

## 2025-04-09 DIAGNOSIS — E11.621 DIABETIC ULCER OF TOE OF RIGHT FOOT ASSOCIATED WITH TYPE 2 DIABETES MELLITUS, WITH NECROSIS OF BONE: ICD-10-CM

## 2025-04-09 DIAGNOSIS — E11.621 DIABETIC ULCER OF TOE OF LEFT FOOT ASSOCIATED WITH TYPE 2 DIABETES MELLITUS, WITH NECROSIS OF BONE: ICD-10-CM

## 2025-04-09 DIAGNOSIS — M86.10 ACUTE OSTEOMYELITIS (HCC): Primary | ICD-10-CM

## 2025-04-09 DIAGNOSIS — L97.524 DIABETIC ULCER OF TOE OF LEFT FOOT ASSOCIATED WITH TYPE 2 DIABETES MELLITUS, WITH NECROSIS OF BONE: ICD-10-CM

## 2025-04-09 DIAGNOSIS — L97.514 DIABETIC ULCER OF TOE OF RIGHT FOOT ASSOCIATED WITH TYPE 2 DIABETES MELLITUS, WITH NECROSIS OF BONE: ICD-10-CM

## 2025-04-09 LAB
GLUCOSE BLD STRIP.AUTO-MCNC: 122 MG/DL (ref 65–100)
GLUCOSE BLD STRIP.AUTO-MCNC: 127 MG/DL (ref 65–100)
SERVICE CMNT-IMP: ABNORMAL
SERVICE CMNT-IMP: ABNORMAL

## 2025-04-09 PROCEDURE — 99183 HYPERBARIC OXYGEN THERAPY: CPT | Performed by: FAMILY MEDICINE

## 2025-04-09 PROCEDURE — G0277 HBOT, FULL BODY CHAMBER, 30M: HCPCS

## 2025-04-09 PROCEDURE — 82962 GLUCOSE BLOOD TEST: CPT

## 2025-04-09 ASSESSMENT — PAIN SCALES - GENERAL
PAINLEVEL_OUTOF10: 0
PAINLEVEL_OUTOF10: 0

## 2025-04-09 NOTE — PROGRESS NOTES
HBO PROGRESS NOTE      NAME: Jacqueline Tay  MEDICAL RECORD NUMBER:  [id number]  AGE: 74 y.o.   GENDER: female  : 1950  EPISODE DATE:  2025     Subjective     HBO Treatment Number: 22 out of Total Treatments: 40    HBO Diagnosis:             Indications: Lower Extremity Diabetic Wound ___(site) (Left Foot)    Safety checks performed prior to treatment.  See doc flowsheets for documentation.    Objective        Recent Labs     25  1028 25  1221   POCGLU 127* 122*     Pre treatment Vital Signs       Temp: 97.6 °F (36.4 °C)     Pulse: (!) 47     Respirations: 18     BP: (!) 121/56       Post treatment Vital Signs  Temp: 97.6 °F (36.4 °C)  Pulse: (!) 43  Respirations: 18  BP: 132/61    Assessment        HBO Diagnosis:   Problem List Items Addressed This Visit          Endocrine    Diabetic ulcer of toe of right foot associated with type 2 diabetes mellitus, with necrosis of bone (Chronic)    Relevant Orders    Notify physician (specify)    Hyperbaric Oxygen Therapy    Hypoglycemial protocol    POCT glucose    Care order/instruction    Care order/instruction    Care order/instruction    Care order/instruction    Care order/instruction    Care order/instruction    Care order/instruction    Care order/instruction    * (Principal) 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

## 2025-04-10 ENCOUNTER — HOSPITAL ENCOUNTER (OUTPATIENT)
Dept: WOUND CARE | Age: 75
Discharge: HOME | End: 2025-04-10
Payer: MEDICARE

## 2025-04-10 VITALS
SYSTOLIC BLOOD PRESSURE: 127 MMHG | RESPIRATION RATE: 18 BRPM | DIASTOLIC BLOOD PRESSURE: 72 MMHG | HEART RATE: 49 BPM | TEMPERATURE: 98.1 F | OXYGEN SATURATION: 97 %

## 2025-04-10 DIAGNOSIS — E11.621 DIABETIC ULCER OF TOE OF RIGHT FOOT ASSOCIATED WITH TYPE 2 DIABETES MELLITUS, WITH NECROSIS OF BONE: ICD-10-CM

## 2025-04-10 DIAGNOSIS — L97.514 DIABETIC ULCER OF TOE OF RIGHT FOOT ASSOCIATED WITH TYPE 2 DIABETES MELLITUS, WITH NECROSIS OF BONE: ICD-10-CM

## 2025-04-10 DIAGNOSIS — M86.10 ACUTE OSTEOMYELITIS (HCC): Primary | ICD-10-CM

## 2025-04-10 DIAGNOSIS — E11.621 DIABETIC ULCER OF TOE OF LEFT FOOT ASSOCIATED WITH TYPE 2 DIABETES MELLITUS, WITH NECROSIS OF BONE: ICD-10-CM

## 2025-04-10 DIAGNOSIS — L97.524 DIABETIC ULCER OF TOE OF LEFT FOOT ASSOCIATED WITH TYPE 2 DIABETES MELLITUS, WITH NECROSIS OF BONE: ICD-10-CM

## 2025-04-10 LAB
GLUCOSE BLD STRIP.AUTO-MCNC: 104 MG/DL (ref 65–100)
GLUCOSE BLD STRIP.AUTO-MCNC: 137 MG/DL (ref 65–100)
SERVICE CMNT-IMP: ABNORMAL
SERVICE CMNT-IMP: ABNORMAL

## 2025-04-10 PROCEDURE — 82962 GLUCOSE BLOOD TEST: CPT

## 2025-04-10 PROCEDURE — G0277 HBOT, FULL BODY CHAMBER, 30M: HCPCS

## 2025-04-10 PROCEDURE — 99183 HYPERBARIC OXYGEN THERAPY: CPT | Performed by: FAMILY MEDICINE

## 2025-04-10 ASSESSMENT — PAIN SCALES - GENERAL
PAINLEVEL_OUTOF10: 0
PAINLEVEL_OUTOF10: 0

## 2025-04-11 ENCOUNTER — HOSPITAL ENCOUNTER (OUTPATIENT)
Dept: WOUND CARE | Age: 75
Discharge: HOME | End: 2025-04-11
Payer: MEDICARE

## 2025-04-11 VITALS
RESPIRATION RATE: 18 BRPM | DIASTOLIC BLOOD PRESSURE: 62 MMHG | HEART RATE: 50 BPM | OXYGEN SATURATION: 99 % | TEMPERATURE: 97.3 F | SYSTOLIC BLOOD PRESSURE: 144 MMHG

## 2025-04-11 DIAGNOSIS — L97.514 DIABETIC ULCER OF TOE OF RIGHT FOOT ASSOCIATED WITH TYPE 2 DIABETES MELLITUS, WITH NECROSIS OF BONE: ICD-10-CM

## 2025-04-11 DIAGNOSIS — M86.10 ACUTE OSTEOMYELITIS (HCC): Primary | ICD-10-CM

## 2025-04-11 DIAGNOSIS — L97.524 DIABETIC ULCER OF TOE OF LEFT FOOT ASSOCIATED WITH TYPE 2 DIABETES MELLITUS, WITH NECROSIS OF BONE: ICD-10-CM

## 2025-04-11 DIAGNOSIS — E11.621 DIABETIC ULCER OF TOE OF LEFT FOOT ASSOCIATED WITH TYPE 2 DIABETES MELLITUS, WITH NECROSIS OF BONE: ICD-10-CM

## 2025-04-11 DIAGNOSIS — E11.621 DIABETIC ULCER OF TOE OF RIGHT FOOT ASSOCIATED WITH TYPE 2 DIABETES MELLITUS, WITH NECROSIS OF BONE: ICD-10-CM

## 2025-04-11 LAB
GLUCOSE BLD STRIP.AUTO-MCNC: 120 MG/DL (ref 65–100)
GLUCOSE BLD STRIP.AUTO-MCNC: 147 MG/DL (ref 65–100)
SERVICE CMNT-IMP: ABNORMAL
SERVICE CMNT-IMP: ABNORMAL

## 2025-04-11 PROCEDURE — G0277 HBOT, FULL BODY CHAMBER, 30M: HCPCS

## 2025-04-11 PROCEDURE — 82962 GLUCOSE BLOOD TEST: CPT

## 2025-04-11 ASSESSMENT — PAIN SCALES - GENERAL
PAINLEVEL_OUTOF10: 0
PAINLEVEL_OUTOF10: 0

## 2025-04-11 NOTE — WOUND CARE
concentrating.  [x]Clumsiness, difficulty walking or numbness and tingling of your arms and legs.  [x]If you are on prescription medicines from your personal doctor, you may continue to take these as directed.      Hyperbaric Medicine Department Information:    If you have questions or develop any of the symptoms mentioned, please contact the Hyperbaric Department at [phone]  MONDAY - FRIDAY 8:30 am - 4:30 pm.  If you need help outside these hours and cannot wait until we are again available, contact your PCP or go to the hospital emergency room.     Additional Instructions:    No    Patient Signature:_______________________Date:_________Time:________    [] Patient unable to sign Discharge Instructions given to ECF/Transportation/POA    The information contained in the After Visit Summary has been reviewed with me, the patient and/or responsible adult, by my health care provider(s).  I had the opportunity to ask questions regarding this information.  I have elected to receive;  []  After Visit Summary  [x]  Comprehensive Discharge Instruction      Nurse Signature:_______________________Date:_________Time:_________    Electronically signed by ZENA VALVERDE on 4/11/2025 at 12:37 PM

## 2025-04-11 NOTE — PROGRESS NOTES
HBO PROGRESS NOTE      NAME: Jacqueline Tay  MEDICAL RECORD NUMBER:  [id number]  AGE: 74 y.o.   GENDER: female  : 1950  EPISODE DATE:  4/10/2025     Subjective     HBO Treatment Number: 23 out of Total Treatments: 40    HBO Diagnosis:             Indications: Lower Extremity Diabetic Wound ___(site) (Left Foot)    Safety checks performed prior to treatment.  See doc flowsheets for documentation.    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)    Relevant Orders    POCT glucose       Musculoskeletal and Integument    Acute osteomyelitis (HCC) - Primary (Chronic)    Relevant Orders    POCT glucose       Physical Exam        General Appearance:  alert and oriented to person, place and time, well-developed and well-nourished, in no acute distress    Pre Tympanic Membrane Assessment:  Right: Normal  Left: Normal    Post Tympanic Membrane Assessment:  Left: Normal  Right: Normal    Pulmonary/Chest:  clear to auscultation bilaterally- no wheezes, rales or rhonchi, normal air movement, no respiratory distress    Cardiovascular:  normal    Plan        Patient placed in a full body Monoplace Chamber #: [id number]  Treatment Start Time: 1059     Pressure Reached Time: 1108  CHRISTOPHER : 2  Number of Air Breaks:        Decompression Time: 1239   Treatment End Time: 1246  Length of Treatment: 90 Minutes  Symptoms Noted During Treatment: None  Total

## 2025-04-12 NOTE — PROGRESS NOTES
HBO PROGRESS NOTE      NAME: Jacqueline Tay  MEDICAL RECORD NUMBER:  [id number]  AGE: 74 y.o.   GENDER: female  : 1950  EPISODE DATE:  2025     Subjective     HBO Treatment Number: 24 out of Total Treatments: 40    HBO Diagnosis:             Indications: Lower Extremity Diabetic Wound ___(site) (left foot)    Safety checks performed prior to treatment.  See doc flowsheets for documentation.    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)    Relevant Orders    POCT glucose       Musculoskeletal and Integument    Acute osteomyelitis (HCC) - Primary (Chronic)    Relevant Orders    POCT glucose       Physical Exam        General Appearance:  alert and oriented to person, place and time, well-developed and well-nourished, in no acute distress    Pre Tympanic Membrane Assessment:  Right: Normal  Left: Normal    Post Tympanic Membrane Assessment:  Left: Normal  Right: Normal    Pulmonary/Chest:  clear to auscultation bilaterally- no wheezes, rales or rhonchi, normal air movement, no respiratory distress    Cardiovascular:  normal    Plan        Patient placed in a full body Monoplace Chamber #: [id number]  Treatment Start Time: 1058     Pressure Reached Time: 1107  CHRISTOPHER : 2  Number of Air Breaks:        Decompression Time: 1238   Treatment End Time: 1245  Length of Treatment: 90 Minutes  Symptoms Noted During Treatment: None  Total

## 2025-04-14 ENCOUNTER — HOSPITAL ENCOUNTER (OUTPATIENT)
Dept: WOUND CARE | Age: 75
Discharge: HOME | End: 2025-04-14
Payer: MEDICARE

## 2025-04-14 VITALS
DIASTOLIC BLOOD PRESSURE: 73 MMHG | SYSTOLIC BLOOD PRESSURE: 131 MMHG | TEMPERATURE: 97.7 F | HEIGHT: 62 IN | HEART RATE: 73 BPM | WEIGHT: 179 LBS | BODY MASS INDEX: 32.94 KG/M2 | RESPIRATION RATE: 18 BRPM

## 2025-04-14 DIAGNOSIS — E11.621 DIABETIC ULCER OF TOE OF LEFT FOOT ASSOCIATED WITH TYPE 2 DIABETES MELLITUS, WITH NECROSIS OF BONE: Chronic | ICD-10-CM

## 2025-04-14 DIAGNOSIS — L97.524 DIABETIC ULCER OF TOE OF LEFT FOOT ASSOCIATED WITH TYPE 2 DIABETES MELLITUS, WITH NECROSIS OF BONE: Chronic | ICD-10-CM

## 2025-04-14 DIAGNOSIS — L97.514 DIABETIC ULCER OF TOE OF RIGHT FOOT ASSOCIATED WITH TYPE 2 DIABETES MELLITUS, WITH NECROSIS OF BONE: Primary | Chronic | ICD-10-CM

## 2025-04-14 DIAGNOSIS — E11.621 DIABETIC ULCER OF TOE OF RIGHT FOOT ASSOCIATED WITH TYPE 2 DIABETES MELLITUS, WITH NECROSIS OF BONE: Primary | Chronic | ICD-10-CM

## 2025-04-14 DIAGNOSIS — E11.42 DIABETIC POLYNEUROPATHY ASSOCIATED WITH TYPE 2 DIABETES MELLITUS: Chronic | ICD-10-CM

## 2025-04-14 PROCEDURE — 11042 DBRDMT SUBQ TIS 1ST 20SQCM/<: CPT

## 2025-04-14 RX ORDER — GINSENG 100 MG
CAPSULE ORAL ONCE
OUTPATIENT
Start: 2025-04-14 | End: 2025-04-14

## 2025-04-14 RX ORDER — BACITRACIN ZINC AND POLYMYXIN B SULFATE 500; 1000 [USP'U]/G; [USP'U]/G
OINTMENT TOPICAL ONCE
Status: DISCONTINUED | OUTPATIENT
Start: 2025-04-14 | End: 2025-04-15 | Stop reason: HOSPADM

## 2025-04-14 RX ORDER — SILVER SULFADIAZINE 10 MG/G
CREAM TOPICAL ONCE
OUTPATIENT
Start: 2025-04-14 | End: 2025-04-14

## 2025-04-14 RX ORDER — SODIUM CHLOR/HYPOCHLOROUS ACID 0.033 %
SOLUTION, IRRIGATION IRRIGATION ONCE
Status: COMPLETED | OUTPATIENT
Start: 2025-04-14 | End: 2025-04-14

## 2025-04-14 RX ORDER — BETAMETHASONE DIPROPIONATE 0.5 MG/G
CREAM TOPICAL ONCE
OUTPATIENT
Start: 2025-04-14 | End: 2025-04-14

## 2025-04-14 RX ORDER — BACITRACIN ZINC AND POLYMYXIN B SULFATE 500; 1000 [USP'U]/G; [USP'U]/G
OINTMENT TOPICAL ONCE
OUTPATIENT
Start: 2025-04-14 | End: 2025-04-14

## 2025-04-14 RX ORDER — MUPIROCIN 20 MG/G
OINTMENT TOPICAL ONCE
OUTPATIENT
Start: 2025-04-14 | End: 2025-04-14

## 2025-04-14 RX ORDER — GENTAMICIN SULFATE 1 MG/G
OINTMENT TOPICAL ONCE
OUTPATIENT
Start: 2025-04-14 | End: 2025-04-14

## 2025-04-14 RX ORDER — CLOBETASOL PROPIONATE 0.5 MG/G
OINTMENT TOPICAL ONCE
OUTPATIENT
Start: 2025-04-14 | End: 2025-04-14

## 2025-04-14 RX ORDER — NEOMYCIN/BACITRACIN/POLYMYXINB 3.5-400-5K
OINTMENT (GRAM) TOPICAL ONCE
OUTPATIENT
Start: 2025-04-14 | End: 2025-04-14

## 2025-04-14 RX ORDER — SODIUM CHLOR/HYPOCHLOROUS ACID 0.033 %
SOLUTION, IRRIGATION IRRIGATION ONCE
OUTPATIENT
Start: 2025-04-14 | End: 2025-04-14

## 2025-04-14 RX ORDER — TRIAMCINOLONE ACETONIDE 1 MG/G
OINTMENT TOPICAL ONCE
OUTPATIENT
Start: 2025-04-14 | End: 2025-04-14

## 2025-04-14 RX ADMIN — Medication: at 13:43

## 2025-04-14 NOTE — WOUND CARE
Discharge Instructions for  [facility]  [address]  [address]  [place] [address]  Phone [phone]   Fax [phone]      NAME:  Jacqueline Tay  YOB: 1950  MEDICAL RECORD NUMBER:  [id number]  DATE:  4/14/2025    Return Appointment:   1 week with Orquidea Cabral NP      Instructions:   Right 3rd Toe and left great toe:   Cleanse with normal saline  Allow Vashe Wound Solution moistened gauze to sit on wound bed for 60 seconds  Apply Xeroform to wound bed  Secure with rolled gauze and tape  Change daily     Left great toe healed at this time, but the goal is to keep area soft with Xeroform  Wear tubigrip from knees to toes on bilateral legs.        Make Appt with [facility] for custom inserts & order shoes online    Continue taking antibiotics as prescribed.   Bring orthotic shoe to next wound appointment     Patient to offload wound with defender boot while standing or weight bearing.     Elevate legs when sitting.  Avoid prolonged standing or sitting with legs in dependent position.  Increase protein intake to promote wound healing. Brandon and Ensure are examples of protein supplements.  Wound healing is greatly slowed when glucose levels are greater than 200. Monitor glucose levels to ensure tight glucose control.     Protein:   Egg ~ 6g  Yogurt ~ 15g  Half cup of cottage cheese ~ 15g  Chicken breast ~ 30g        Should you experience increased redness, swelling, pain, foul odor, size of wound(s), or have a temperature over 101 degrees please contact the Wound Healing Center at [phone] or if after hours contact your primary care physician or go to the hospital emergency department.    PLEASE NOTE: IF YOU ARE UNABLE TO OBTAIN WOUND SUPPLIES, CONTINUE TO USE THE SUPPLIES YOU HAVE AVAILABLE UNTIL YOU ARE ABLE TO REACH US. IT IS MOST IMPORTANT TO KEEP THE WOUND COVERED AT ALL TIMES.    Electronically signed ANGEL LANCE RN on 4/14/2025 at

## 2025-04-14 NOTE — PROGRESS NOTES
[facility]  Procedure Note    Jacqueline Tay  MEDICAL RECORD NUMBER: [id number]  AGE: 74 y.o.     GENDER: female    : 1950  EPISODE DATE: 2025    Problem List Items Addressed This Visit          Endocrine    Diabetic ulcer of toe of right foot associated with type 2 diabetes mellitus, with necrosis of bone - Primary (Chronic)      Assessment  Right 3rd toe wound size is slightly smaller; overall less callus buildup noted  Remains on Keflex  Has completed HBOT  Plan  Excisional debridement to remove devitalized tissue and promote wound healing   Continue Vashe, Hydrofera blue, Darco to right foot; change 3x weekly  Continue abx to EOT  RTC 1 week         Relevant Medications    bacitracin-polymyxin b (POLYSPORIN) ointment    Other Relevant Orders    Initiate Outpatient Wound Care Protocol    Diabetic polyneuropathy associated with type 2 diabetes mellitus (Chronic)    Relevant Medications    bacitracin-polymyxin b (POLYSPORIN) ointment    Other Relevant Orders    Initiate Outpatient Wound Care Protocol    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  Performed by: DORON Patrick - TIFFANI  Consent obtained: Yes  Time out taken: Yes  Pain control:     Wound #: 1  Diabetic/pressure/chronic non-pressure ulcers only: diabetic ulcer, fat layer exposed was/were debrided using curette. The wound(s) was/were debrided down through/to subcutaneous tissue. Devitalized

## 2025-04-14 NOTE — ASSESSMENT & PLAN NOTE
Assessment  Right 3rd toe wound size is slightly smaller; overall less callus buildup noted  Remains on Keflex  Has completed HBOT  Plan  Excisional debridement to remove devitalized tissue and promote wound healing   Continue Vashe, Hydrofera blue, Darco to right foot; change 3x weekly  Continue abx to EOT  RTC 1 week

## 2025-04-14 NOTE — ASSESSMENT & PLAN NOTE
Assessment  Left great toe remains healed  Remains on Keflex  Has completed HBOT  Plan  Will transition to Defender boot for offloading; she has an appt on 4/18/25 to be fitted for custom shoes and orthotics  Continue abx to EOT (4/24/25)  RTC 1 week

## 2025-04-14 NOTE — FLOWSHEET NOTE
04/14/25 1315   Right Leg Edema Point of Measurement   Leg circumference 32 cm   Ankle circumference 20 cm   Foot circumference 21 cm   Compression Therapy Tubular elastic support bandage   Left Leg Edema Point of Measurement   Leg circumference 33 cm   Ankle circumference 20 cm   Foot circumference 22 cm   Compression Therapy Tubular elastic support bandage   Wound 01/14/25 Toe (Comment  which one) Right #1 3rd toe   Date First Assessed/Time First Assessed: 01/14/25 1038   Present on Original Admission: Yes  Wound Approximate Age at First Assessment (Weeks): 50 weeks  Primary Wound Type: Diabetic Ulcer  Location: (c) Toe (Comment  which one)  Wound Location [place]...   Wound Image     Wound Etiology Diabetic Mota 3   Dressing Status Old drainage noted   Wound Cleansed Cleansed with saline   Dressing/Treatment Hydrofera blue   Offloading for Diabetic Foot Ulcers Offloading ordered   Wound Length (cm) 0.3 cm   Wound Width (cm) 0.3 cm   Wound Depth (cm) 0.1 cm   Wound Surface Area (cm^2) 0.09 cm^2   Change in Wound Size % (l*w) 0   Wound Volume (cm^3) 0.009 cm^3   Wound Healing % 67   Post-Procedure Length (cm) 0.4 cm   Post-Procedure Width (cm) 0.3 cm   Post-Procedure Depth (cm) 0.2 cm   Post-Procedure Surface Area (cm^2) 0.12 cm^2   Post-Procedure Volume (cm^3) 0.024 cm^3   Wound Assessment Pink/red   Drainage Amount Small (< 25%)   Drainage Description Serosanguinous   Odor None   Jennifer-wound Assessment Hyperkeratosis (callous)   Margins Attached edges   Wound Thickness Description not for Pressure Injury Full thickness   Wound 01/14/25 Toe (Comment  which one) Left #2 Left Great toe   Date First Assessed/Time First Assessed: 01/14/25 1038   Present on Original Admission: Yes  Wound Approximate Age at First Assessment (Weeks): 50 weeks  Primary Wound Type: Diabetic Ulcer  Location: (c) Toe (Comment  which one)  Wound Location [place]...   Wound Image    Wound Etiology Diabetic Mota 3   Dressing Status Old

## 2025-04-21 ENCOUNTER — HOSPITAL ENCOUNTER (OUTPATIENT)
Dept: WOUND CARE | Age: 75
Discharge: HOME | End: 2025-04-21
Payer: MEDICARE

## 2025-04-21 VITALS
TEMPERATURE: 97.9 F | SYSTOLIC BLOOD PRESSURE: 122 MMHG | RESPIRATION RATE: 18 BRPM | OXYGEN SATURATION: 95 % | HEIGHT: 62 IN | WEIGHT: 182 LBS | BODY MASS INDEX: 33.49 KG/M2 | HEART RATE: 51 BPM | DIASTOLIC BLOOD PRESSURE: 53 MMHG

## 2025-04-21 DIAGNOSIS — E11.621 DIABETIC ULCER OF TOE OF RIGHT FOOT ASSOCIATED WITH TYPE 2 DIABETES MELLITUS, WITH NECROSIS OF BONE (HCC): Primary | Chronic | ICD-10-CM

## 2025-04-21 DIAGNOSIS — E11.42 DIABETIC POLYNEUROPATHY ASSOCIATED WITH TYPE 2 DIABETES MELLITUS (HCC): Chronic | ICD-10-CM

## 2025-04-21 DIAGNOSIS — L97.514 DIABETIC ULCER OF TOE OF RIGHT FOOT ASSOCIATED WITH TYPE 2 DIABETES MELLITUS, WITH NECROSIS OF BONE (HCC): Primary | Chronic | ICD-10-CM

## 2025-04-21 PROCEDURE — 11042 DBRDMT SUBQ TIS 1ST 20SQCM/<: CPT

## 2025-04-21 RX ORDER — SILVER SULFADIAZINE 10 MG/G
CREAM TOPICAL ONCE
OUTPATIENT
Start: 2025-04-21 | End: 2025-04-21

## 2025-04-21 RX ORDER — SODIUM CHLOR/HYPOCHLOROUS ACID 0.033 %
SOLUTION, IRRIGATION IRRIGATION ONCE
OUTPATIENT
Start: 2025-04-21 | End: 2025-04-21

## 2025-04-21 RX ORDER — BETAMETHASONE DIPROPIONATE 0.5 MG/G
CREAM TOPICAL ONCE
OUTPATIENT
Start: 2025-04-21 | End: 2025-04-21

## 2025-04-21 RX ORDER — GINSENG 100 MG
CAPSULE ORAL ONCE
OUTPATIENT
Start: 2025-04-21 | End: 2025-04-21

## 2025-04-21 RX ORDER — NEOMYCIN/BACITRACIN/POLYMYXINB 3.5-400-5K
OINTMENT (GRAM) TOPICAL ONCE
OUTPATIENT
Start: 2025-04-21 | End: 2025-04-21

## 2025-04-21 RX ORDER — LIDOCAINE HYDROCHLORIDE 20 MG/ML
JELLY TOPICAL PRN
Status: DISCONTINUED | OUTPATIENT
Start: 2025-04-21 | End: 2025-04-22 | Stop reason: HOSPADM

## 2025-04-21 RX ORDER — CLOBETASOL PROPIONATE 0.5 MG/G
OINTMENT TOPICAL ONCE
OUTPATIENT
Start: 2025-04-21 | End: 2025-04-21

## 2025-04-21 RX ORDER — MUPIROCIN 20 MG/G
OINTMENT TOPICAL ONCE
OUTPATIENT
Start: 2025-04-21 | End: 2025-04-21

## 2025-04-21 RX ORDER — GENTAMICIN SULFATE 1 MG/G
OINTMENT TOPICAL ONCE
OUTPATIENT
Start: 2025-04-21 | End: 2025-04-21

## 2025-04-21 RX ORDER — TRIAMCINOLONE ACETONIDE 1 MG/G
OINTMENT TOPICAL ONCE
OUTPATIENT
Start: 2025-04-21 | End: 2025-04-21

## 2025-04-21 RX ORDER — BACITRACIN ZINC AND POLYMYXIN B SULFATE 500; 1000 [USP'U]/G; [USP'U]/G
OINTMENT TOPICAL ONCE
OUTPATIENT
Start: 2025-04-21 | End: 2025-04-21

## 2025-04-21 RX ADMIN — LIDOCAINE HYDROCHLORIDE: 20 JELLY TOPICAL at 14:10

## 2025-04-21 NOTE — PROGRESS NOTES
[facility]  Procedure Note    Jacqueline Tay  MEDICAL RECORD NUMBER: [id number]  AGE: 74 y.o.     GENDER: female    : 1950  EPISODE DATE: 2025    Problem List Items Addressed This Visit          Endocrine    Diabetic ulcer of toe of right foot associated with type 2 diabetes mellitus, with necrosis of bone - Primary (Chronic)      Assessment  Right 3rd toe wound is larger; states she was up on her feet more over the weekend  She has been wearing her old diabetic shoe on the right foot  Remains on Keflex, EOT 25  Has completed HBOT  Plan  Excisional debridement to remove devitalized tissue and promote wound healing   Continue Vashe, Hydrofera blue, Defender boot right foot; change 3x weekly  Continue abx to EOT  RTC 1 week         Relevant Orders    Initiate Outpatient Wound Care Protocol    Diabetic polyneuropathy associated with type 2 diabetes mellitus (Chronic)    Relevant Orders    Initiate Outpatient Wound Care Protocol     Procedure Note: Excisional Debridement  Indications: Based on my examination of the patient's wound(s) today, debridement is required to remove devitalized tissue, evaluate the wound base, and promote wound healing.  Performed by: DORON Patrick NP  Consent obtained: Yes  Time out taken: Yes  Pain control:     Wound #: 1  Diabetic/pressure/chronic non-pressure ulcers only: diabetic ulcer, fat layer exposed was/were debrided using curette. The wound(s) was/were debrided down through/to subcutaneous tissue. Devitalized tissue debrided: biofilm, slough, exudate, and callus. Pre and post debridement measurements: see flow sheet.    Wound 24 Toe (Comment  which one) Left;Anterior \"blister\" per pt scabbed over, inside lateral great toe (Active)   Number of days: 267       Wound 24 Toe (Comment  which one) Right R 3rd toe (Active)   Number of days: 265       Wound 25 Toe (Comment  which one) Right #1 3rd toe

## 2025-04-21 NOTE — ASSESSMENT & PLAN NOTE
Assessment  Right 3rd toe wound is larger; states she was up on her feet more over the weekend  She has been wearing her old diabetic shoe on the right foot  Remains on Keflex, EOT 4/24/25  Has completed HBOT  Plan  Excisional debridement to remove devitalized tissue and promote wound healing   Continue Vashe, Hydrofera blue, Defender boot right foot; change 3x weekly  Continue abx to EOT  RTC 1 week

## 2025-04-21 NOTE — FLOWSHEET NOTE
04/21/25 1339   Wound 01/14/25 Toe (Comment  which one) Right #1 3rd toe   Date First Assessed/Time First Assessed: 01/14/25 1038   Present on Original Admission: Yes  Wound Approximate Age at First Assessment (Weeks): 50 weeks  Primary Wound Type: Diabetic Ulcer  Location: (c) Toe (Comment  which one)  Wound Location [place]...   Wound Image    Wound Etiology Diabetic Mota 3   Dressing Status Old drainage noted   Wound Cleansed Cleansed with saline   Dressing/Treatment Xeroform   Offloading for Diabetic Foot Ulcers Offloading ordered   Wound Length (cm) 1.4 cm   Wound Width (cm) 0.7 cm   Wound Depth (cm) 0.1 cm   Wound Surface Area (cm^2) 0.98 cm^2   Change in Wound Size % (l*w) -988.89   Wound Volume (cm^3) 0.098 cm^3   Wound Healing % -263   Post-Procedure Length (cm) 1.4 cm   Post-Procedure Width (cm) 0.7 cm   Post-Procedure Depth (cm) 0.1 cm   Post-Procedure Surface Area (cm^2) 0.98 cm^2   Post-Procedure Volume (cm^3) 0.098 cm^3   Wound Assessment Pink/red   Drainage Amount Small (< 25%)   Drainage Description Serosanguinous   Odor None   Jennifer-wound Assessment Hyperkeratosis (callous)   Wound Thickness Description not for Pressure Injury Full thickness     Pre- and post-debridement

## 2025-04-21 NOTE — WOUND CARE
Discharge Instructions for  [facility]  [address] [place] [address]  [address] [place] [id number]  Phone [phone]   Fax [phone]      NAME:  Jacqueline Tay  YOB: 1950  MEDICAL RECORD NUMBER:  [id number]  DATE:  4/21/2025    Return Appointment:   1 week with Orquidea Cabral NP      Instructions:   Right 3rd Toe:   Cleanse with normal saline  Allow Vashe Wound Solution moistened gauze to sit on wound bed for 60 seconds  Apply Xeroform to wound bed  Secure with rolled gauze and tape  Change daily     Left great toe healed at this time, but the goal is to keep area soft with Vaseline only  Wear tubigrip from knees to toes on bilateral legs.     Continue taking antibiotics as prescribed.   Once shoes and inserts obtained, begin wearing new shoe on left and start wearing Defender Boot on the right for offloading.     Elevate legs when sitting.  Avoid prolonged standing or sitting with legs in dependent position.  Increase protein intake to promote wound healing. Brandon and Ensure are examples of protein supplements.  Wound healing is greatly slowed when glucose levels are greater than 200. Monitor glucose levels to ensure tight glucose control.     Protein:   Egg ~ 6g  Yogurt ~ 15g  Half cup of cottage cheese ~ 15g  Chicken breast ~ 30g        Should you experience increased redness, swelling, pain, foul odor, size of wound(s), or have a temperature over 101 degrees please contact the Wound Healing Center at [phone] or if after hours contact your primary care physician or go to the hospital emergency department.    PLEASE NOTE: IF YOU ARE UNABLE TO OBTAIN WOUND SUPPLIES, CONTINUE TO USE THE SUPPLIES YOU HAVE AVAILABLE UNTIL YOU ARE ABLE TO REACH US. IT IS MOST IMPORTANT TO KEEP THE WOUND COVERED AT ALL TIMES.    Electronically signed Emily Blanca PT, WCC on 4/21/2025 at 2:15 PM

## 2025-04-21 NOTE — DISCHARGE INSTRUCTIONS
Return Appointment:   1 week with Orquidea Cabral NP        Instructions:   Right 3rd Toe:   Cleanse with normal saline  Allow Vashe Wound Solution moistened gauze to sit on wound bed for 60 seconds  Apply Xeroform to wound bed  Secure with rolled gauze and tape  Change daily     Left great toe healed at this time, but the goal is to keep area soft with Vaseline only  Wear tubigrip from knees to toes on bilateral legs.      Continue taking antibiotics as prescribed.   Once shoes and inserts obtained, begin wearing new shoe on left and start wearing Defender Boot on the right for offloading.     Elevate legs when sitting.  Avoid prolonged standing or sitting with legs in dependent position.  Increase protein intake to promote wound healing. Brandon and Ensure are examples of protein supplements.  Wound healing is greatly slowed when glucose levels are greater than 200. Monitor glucose levels to ensure tight glucose control.     Protein:   Egg ~ 6g  Yogurt ~ 15g  Half cup of cottage cheese ~ 15g  Chicken breast ~ 30g

## 2025-04-28 ENCOUNTER — HOSPITAL ENCOUNTER (OUTPATIENT)
Dept: WOUND CARE | Age: 75
Discharge: HOME | End: 2025-04-28
Payer: MEDICARE

## 2025-04-28 VITALS
HEART RATE: 56 BPM | TEMPERATURE: 98.1 F | RESPIRATION RATE: 16 BRPM | WEIGHT: 184 LBS | BODY MASS INDEX: 33.86 KG/M2 | HEIGHT: 62 IN | SYSTOLIC BLOOD PRESSURE: 111 MMHG | DIASTOLIC BLOOD PRESSURE: 58 MMHG | OXYGEN SATURATION: 94 %

## 2025-04-28 DIAGNOSIS — E11.621 DIABETIC ULCER OF TOE OF LEFT FOOT ASSOCIATED WITH TYPE 2 DIABETES MELLITUS, WITH NECROSIS OF BONE (HCC): Chronic | ICD-10-CM

## 2025-04-28 DIAGNOSIS — E11.42 DIABETIC POLYNEUROPATHY ASSOCIATED WITH TYPE 2 DIABETES MELLITUS (HCC): Chronic | ICD-10-CM

## 2025-04-28 DIAGNOSIS — L97.524 DIABETIC ULCER OF TOE OF LEFT FOOT ASSOCIATED WITH TYPE 2 DIABETES MELLITUS, WITH NECROSIS OF BONE (HCC): Chronic | ICD-10-CM

## 2025-04-28 DIAGNOSIS — L97.514 DIABETIC ULCER OF TOE OF RIGHT FOOT ASSOCIATED WITH TYPE 2 DIABETES MELLITUS, WITH NECROSIS OF BONE (HCC): Primary | Chronic | ICD-10-CM

## 2025-04-28 DIAGNOSIS — E11.621 DIABETIC ULCER OF TOE OF RIGHT FOOT ASSOCIATED WITH TYPE 2 DIABETES MELLITUS, WITH NECROSIS OF BONE (HCC): Primary | Chronic | ICD-10-CM

## 2025-04-28 PROCEDURE — 11042 DBRDMT SUBQ TIS 1ST 20SQCM/<: CPT

## 2025-04-28 RX ORDER — GINSENG 100 MG
CAPSULE ORAL ONCE
OUTPATIENT
Start: 2025-04-28 | End: 2025-04-28

## 2025-04-28 RX ORDER — MUPIROCIN 20 MG/G
OINTMENT TOPICAL ONCE
OUTPATIENT
Start: 2025-04-28 | End: 2025-04-28

## 2025-04-28 RX ORDER — SODIUM CHLOR/HYPOCHLOROUS ACID 0.033 %
SOLUTION, IRRIGATION IRRIGATION ONCE
Status: COMPLETED | OUTPATIENT
Start: 2025-04-28 | End: 2025-04-28

## 2025-04-28 RX ORDER — NEOMYCIN/BACITRACIN/POLYMYXINB 3.5-400-5K
OINTMENT (GRAM) TOPICAL ONCE
OUTPATIENT
Start: 2025-04-28 | End: 2025-04-28

## 2025-04-28 RX ORDER — SILVER SULFADIAZINE 10 MG/G
CREAM TOPICAL ONCE
OUTPATIENT
Start: 2025-04-28 | End: 2025-04-28

## 2025-04-28 RX ORDER — GENTAMICIN SULFATE 1 MG/G
OINTMENT TOPICAL ONCE
OUTPATIENT
Start: 2025-04-28 | End: 2025-04-28

## 2025-04-28 RX ORDER — CLOBETASOL PROPIONATE 0.5 MG/G
OINTMENT TOPICAL ONCE
OUTPATIENT
Start: 2025-04-28 | End: 2025-04-28

## 2025-04-28 RX ORDER — BETAMETHASONE DIPROPIONATE 0.5 MG/G
CREAM TOPICAL ONCE
OUTPATIENT
Start: 2025-04-28 | End: 2025-04-28

## 2025-04-28 RX ORDER — BACITRACIN ZINC AND POLYMYXIN B SULFATE 500; 1000 [USP'U]/G; [USP'U]/G
OINTMENT TOPICAL ONCE
OUTPATIENT
Start: 2025-04-28 | End: 2025-04-28

## 2025-04-28 RX ORDER — SODIUM CHLOR/HYPOCHLOROUS ACID 0.033 %
SOLUTION, IRRIGATION IRRIGATION ONCE
OUTPATIENT
Start: 2025-04-28 | End: 2025-04-28

## 2025-04-28 RX ORDER — TRIAMCINOLONE ACETONIDE 1 MG/G
OINTMENT TOPICAL ONCE
OUTPATIENT
Start: 2025-04-28 | End: 2025-04-28

## 2025-04-28 RX ADMIN — Medication: at 14:13

## 2025-04-28 NOTE — ASSESSMENT & PLAN NOTE
Assessment  Left great and 2nd toes with new hemorrhagic callus; no open wound noted  Custom orthotics are ordered, will take a month to be delivered  Plan  Callus pared, no open wound underneath  Patient to start wearing Darco again; also recommend crest pad (has one at home)  RTC 1 week

## 2025-04-28 NOTE — ASSESSMENT & PLAN NOTE
Assessment  Right 3rd toe wound is smaller today with less periwound callus, negative probe to bone  Has completed abx and HBOT  Plan  Excisional debridement to remove devitalized tissue and promote wound healing   Continue Vashe, Xeroform gauze, Defender boot right foot; change 3x weekly  RTC 1 week

## 2025-04-28 NOTE — DISCHARGE INSTRUCTIONS
Instructions:   Right 3rd Toe:   Cleanse with normal saline  Allow Vashe Wound Solution moistened gauze to sit on wound bed for 60 seconds  Apply Xeroform to wound bed  Secure with rolled gauze and tape  Change daily     CREST PAD to left foot to offload healed toe wounds      Left great toe healed at this time, but the goal is to keep area soft with Vaseline only  Wear tubigrip from knees to toes on bilateral legs.      Finish antibiotics as prescribed.   Defender boot right foot ; darco with PEG insert to left foot.      Elevate legs when sitting.  Avoid prolonged standing or sitting with legs in dependent position.  Increase protein intake to promote wound healing. Brandon and Ensure are examples of protein supplements.  Wound healing is greatly slowed when glucose levels are greater than 200. Monitor glucose levels to ensure tight glucose control.     Protein:   Egg ~ 6g  Yogurt ~ 15g  Half cup of cottage cheese ~ 15g  Chicken breast ~ 30g     Should you experience increased redness, swelling, pain, foul odor, size of wound(s), or have a temperature over 101 degrees please contact the [facility] at [phone] or if after hours contact your primary care physician or go to the hospital emergency department.     PLEASE NOTE: IF YOU ARE UNABLE TO OBTAIN WOUND SUPPLIES, CONTINUE TO USE THE SUPPLIES YOU HAVE AVAILABLE UNTIL YOU ARE ABLE TO REACH US. IT IS MOST IMPORTANT TO KEEP THE WOUND COVERED AT ALL TIMES.

## 2025-04-28 NOTE — PROGRESS NOTES
[facility]  Procedure Note    Jacqueline Tay  MEDICAL RECORD NUMBER: [id number]  AGE: 74 y.o.     GENDER: female    : 1950  EPISODE DATE: 2025    Problem List Items Addressed This Visit          Endocrine    * (Principal) Diabetic ulcer of toe of right foot associated with type 2 diabetes mellitus, with necrosis of bone (HCC) - Primary (Chronic)      Assessment  Right 3rd toe wound is smaller today with less periwound callus, negative probe to bone  Has completed abx and HBOT  Plan  Excisional debridement to remove devitalized tissue and promote wound healing   Continue Vashe, Xeroform gauze, Defender boot right foot; change 3x weekly  RTC 1 week         Relevant Medications    vashe wound therapy external solution (Start on 2025  2:00 PM)    Other Relevant Orders    Initiate Outpatient Wound Care Protocol    Diabetic polyneuropathy associated with type 2 diabetes mellitus (HCC) (Chronic)    Relevant Medications    vashe wound therapy external solution (Start on 2025  2:00 PM)    Other Relevant Orders    Initiate Outpatient Wound Care Protocol    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  Performed by: DORON Patrick NP  Consent obtained: Yes  Time out taken: Yes  Pain control:     Wound #: 1  Diabetic/pressure/chronic non-pressure ulcers only: diabetic ulcer, fat layer exposed was/were debrided using

## 2025-04-28 NOTE — FLOWSHEET NOTE
04/28/25 1321   Right Leg Edema Point of Measurement   Leg circumference 33.5 cm   Ankle circumference 21 cm   Foot circumference 21.5 cm   Compression Therapy Tubular elastic support bandage   Wound 01/14/25 Toe (Comment  which one) Right #1 3rd toe   Date First Assessed/Time First Assessed: 01/14/25 1038   Present on Original Admission: Yes  Wound Approximate Age at First Assessment (Weeks): 50 weeks  Primary Wound Type: Diabetic Ulcer  Location: (c) Toe (Comment  which one)  Wound Location [place]...   Wound Image     Wound Etiology Diabetic Mota 3   Dressing Status Old drainage noted   Wound Cleansed Cleansed with saline   Dressing/Treatment Xeroform   Offloading for Diabetic Foot Ulcers Offloading ordered   Wound Length (cm) 0.9 cm   Wound Width (cm) 0.3 cm   Wound Depth (cm) 0.1 cm   Wound Surface Area (cm^2) 0.27 cm^2   Change in Wound Size % (l*w) -200   Wound Volume (cm^3) 0.027 cm^3   Wound Healing % 0   Post-Procedure Length (cm) 0.9 cm   Post-Procedure Width (cm) 0.5 cm   Post-Procedure Depth (cm) 0.2 cm   Post-Procedure Surface Area (cm^2) 0.45 cm^2   Post-Procedure Volume (cm^3) 0.09 cm^3   Wound Assessment Pink/red   Drainage Amount Scant (moist but unmeasurable)   Drainage Description Serosanguinous   Odor None   Jennifer-wound Assessment Hyperkeratosis (callous)   Wound Thickness Description not for Pressure Injury Full thickness   Pain Assessment   Pain Assessment None - Denies Pain

## 2025-04-28 NOTE — WOUND CARE
Discharge Instructions for  [facility]  [address]  [address]  [place] [address]  Phone [phone]   Fax [phone]      NAME:  Jacqueline Tay  YOB: 1950  MEDICAL RECORD NUMBER:  [id number]  DATE:  4/28/2025    Return Appointment:   1 week with Orquidea Cabral NP      Instructions:   Right 3rd Toe:   Cleanse with normal saline  Allow Vashe Wound Solution moistened gauze to sit on wound bed for 60 seconds  Apply Xeroform to wound bed  Secure with rolled gauze and tape  Change daily    CREST PAD to left 2nd/ 3rd toes.      Left great toe healed at this time, but the goal is to keep area soft with Vaseline only  Wear tubigrip from knees to toes on bilateral legs.     Complete antibiotics as prescribed.   Once shoes and inserts obtained, begin wearing new shoe on left and start wearing Defender Defender boot on right foot ;   darco with PEG insert to left foot      Elevate legs when sitting.  Avoid prolonged standing or sitting with legs in dependent position.  Increase protein intake to promote wound healing. Brandon and Ensure are examples of protein supplements.  Wound healing is greatly slowed when glucose levels are greater than 200. Monitor glucose levels to ensure tight glucose control.     Protein:   Egg ~ 6g  Yogurt ~ 15g  Half cup of cottage cheese ~ 15g  Chicken breast ~ 30g    Should you experience increased redness, swelling, pain, foul odor, size of wound(s), or have a temperature over 101 degrees please contact the Wound Healing Center at [phone] or if after hours contact your primary care physician or go to the hospital emergency department.    PLEASE NOTE: IF YOU ARE UNABLE TO OBTAIN WOUND SUPPLIES, CONTINUE TO USE THE SUPPLIES YOU HAVE AVAILABLE UNTIL YOU ARE ABLE TO REACH US. IT IS MOST IMPORTANT TO KEEP THE WOUND COVERED AT ALL TIMES.    Electronically signed ADRIA BE RN, C on 4/28/2025 at 1:45 PM

## 2025-04-28 NOTE — WOUND CARE
Discharge Instructions for  [facility]  [address] [place] [address]  [address]  [place] [address]  Phone [phone]   Fax [phone]      NAME:  Jacqueline Tay  YOB: 1950  MEDICAL RECORD NUMBER:  [id number]  DATE:  4/28/2025    Return Appointment:   1 week with Orquidea Cabral NP      Instructions:   Right 3rd Toe:   Cleanse with normal saline  Allow Vashe Wound Solution moistened gauze to sit on wound bed for 60 seconds  Apply Xeroform to wound bed  Secure with rolled gauze and tape  Change daily    CREST PAD to left foot to offload healed toe wounds      Left great toe healed at this time, but the goal is to keep area soft with Vaseline only  Wear tubigrip from knees to toes on bilateral legs.     Finish antibiotics as prescribed.   Defender boot right foot ; darco with PEG insert to left foot.      Elevate legs when sitting.  Avoid prolonged standing or sitting with legs in dependent position.  Increase protein intake to promote wound healing. Brandon and Ensure are examples of protein supplements.  Wound healing is greatly slowed when glucose levels are greater than 200. Monitor glucose levels to ensure tight glucose control.     Protein:   Egg ~ 6g  Yogurt ~ 15g  Half cup of cottage cheese ~ 15g  Chicken breast ~ 30g    Should you experience increased redness, swelling, pain, foul odor, size of wound(s), or have a temperature over 101 degrees please contact the W[facility] Healing Center at [phone] or if after hours contact your primary care physician or go to the hospital emergency department.    PLEASE NOTE: IF YOU ARE UNABLE TO OBTAIN WOUND SUPPLIES, CONTINUE TO USE THE SUPPLIES YOU HAVE AVAILABLE UNTIL YOU ARE ABLE TO REACH US. IT IS MOST IMPORTANT TO KEEP THE WOUND COVERED AT ALL TIMES.    Electronically signed ADRIA BE RN, [facility] on 4/28/2025 at 2:18 PM

## 2025-05-05 ENCOUNTER — HOSPITAL ENCOUNTER (OUTPATIENT)
Dept: WOUND CARE | Age: 75
Discharge: HOME | End: 2025-05-05
Payer: MEDICARE

## 2025-05-05 VITALS
HEIGHT: 62 IN | WEIGHT: 180 LBS | DIASTOLIC BLOOD PRESSURE: 63 MMHG | SYSTOLIC BLOOD PRESSURE: 123 MMHG | RESPIRATION RATE: 16 BRPM | HEART RATE: 50 BPM | TEMPERATURE: 98.2 F | BODY MASS INDEX: 33.13 KG/M2

## 2025-05-05 DIAGNOSIS — L97.512 DIABETIC ULCER OF TOE OF RIGHT FOOT ASSOCIATED WITH TYPE 2 DIABETES MELLITUS, WITH FAT LAYER EXPOSED (HCC): ICD-10-CM

## 2025-05-05 DIAGNOSIS — E11.621 DIABETIC ULCER OF TOE OF RIGHT FOOT ASSOCIATED WITH TYPE 2 DIABETES MELLITUS, WITH NECROSIS OF BONE (HCC): ICD-10-CM

## 2025-05-05 DIAGNOSIS — L97.514 DIABETIC ULCER OF TOE OF RIGHT FOOT ASSOCIATED WITH TYPE 2 DIABETES MELLITUS, WITH NECROSIS OF BONE (HCC): ICD-10-CM

## 2025-05-05 DIAGNOSIS — E11.621 DIABETIC ULCER OF TOE OF RIGHT FOOT ASSOCIATED WITH TYPE 2 DIABETES MELLITUS, WITH FAT LAYER EXPOSED (HCC): ICD-10-CM

## 2025-05-05 DIAGNOSIS — E11.42 DIABETIC POLYNEUROPATHY ASSOCIATED WITH TYPE 2 DIABETES MELLITUS (HCC): Primary | ICD-10-CM

## 2025-05-05 PROCEDURE — 11042 DBRDMT SUBQ TIS 1ST 20SQCM/<: CPT

## 2025-05-05 RX ORDER — SODIUM CHLOR/HYPOCHLOROUS ACID 0.033 %
SOLUTION, IRRIGATION IRRIGATION ONCE
Status: COMPLETED | OUTPATIENT
Start: 2025-05-05 | End: 2025-05-05

## 2025-05-05 RX ORDER — TRIAMCINOLONE ACETONIDE 1 MG/G
OINTMENT TOPICAL ONCE
OUTPATIENT
Start: 2025-05-05 | End: 2025-05-05

## 2025-05-05 RX ORDER — CLOBETASOL PROPIONATE 0.5 MG/G
OINTMENT TOPICAL ONCE
OUTPATIENT
Start: 2025-05-05 | End: 2025-05-05

## 2025-05-05 RX ORDER — NEOMYCIN/BACITRACIN/POLYMYXINB 3.5-400-5K
OINTMENT (GRAM) TOPICAL ONCE
OUTPATIENT
Start: 2025-05-05 | End: 2025-05-05

## 2025-05-05 RX ORDER — BETAMETHASONE DIPROPIONATE 0.5 MG/G
CREAM TOPICAL ONCE
OUTPATIENT
Start: 2025-05-05 | End: 2025-05-05

## 2025-05-05 RX ORDER — MUPIROCIN 20 MG/G
OINTMENT TOPICAL ONCE
OUTPATIENT
Start: 2025-05-05 | End: 2025-05-05

## 2025-05-05 RX ORDER — GENTAMICIN SULFATE 1 MG/G
OINTMENT TOPICAL ONCE
OUTPATIENT
Start: 2025-05-05 | End: 2025-05-05

## 2025-05-05 RX ORDER — BACITRACIN ZINC AND POLYMYXIN B SULFATE 500; 1000 [USP'U]/G; [USP'U]/G
OINTMENT TOPICAL ONCE
OUTPATIENT
Start: 2025-05-05 | End: 2025-05-05

## 2025-05-05 RX ORDER — GINSENG 100 MG
CAPSULE ORAL ONCE
OUTPATIENT
Start: 2025-05-05 | End: 2025-05-05

## 2025-05-05 RX ORDER — SODIUM CHLOR/HYPOCHLOROUS ACID 0.033 %
SOLUTION, IRRIGATION IRRIGATION ONCE
OUTPATIENT
Start: 2025-05-05 | End: 2025-05-05

## 2025-05-05 RX ORDER — SILVER SULFADIAZINE 10 MG/G
CREAM TOPICAL ONCE
OUTPATIENT
Start: 2025-05-05 | End: 2025-05-05

## 2025-05-05 RX ADMIN — Medication: at 13:44

## 2025-05-05 NOTE — PROGRESS NOTES
[facility]  Procedure Note    Jacqueline Tay  MEDICAL RECORD NUMBER: [id number]  AGE: 74 y.o.     GENDER: female    : 1950  EPISODE DATE: 2025    Problem List Items Addressed This Visit          Endocrine    Diabetic polyneuropathy associated with type 2 diabetes mellitus (HCC) - Primary (Chronic)    Relevant Orders    Initiate Outpatient Wound Care Protocol    * (Principal) Diabetic ulcer of toe of right foot associated with type 2 diabetes mellitus, with fat layer exposed (HCC)      Assessment  Right 3rd toe wound is smaller today with less periwound callus, negative probe to bone  Has completed abx and HBOT  Has not been wearing Defender boot on right; has been wearing an orthotic sandal and a crest pad  Plan  Excisional debridement to remove devitalized tissue and promote wound healing   Continue Vashe, Xeroform gauze, Defender boot right foot; change 3x weekly  Once new inserts are picked up and patient can wear custom shoes/inserts on left, will consider TCC on right  RTC 1 week          Procedure Note: Excisional Debridement  Indications: Based on my examination of the patient's wound(s) today, debridement is required to remove devitalized tissue, evaluate the wound base, and promote wound healing.  Performed by: DORON Patrick - TIFFANI  Consent obtained: Yes  Time out taken: Yes  Pain control:     Wound #: 1  Diabetic/pressure/chronic non-pressure ulcers only: diabetic ulcer, fat layer exposed was/were debrided using curette. The wound(s) was/were debrided down through/to subcutaneous tissue. Devitalized tissue debrided: slough and callus. Pre and post debridement measurements: see flow sheet.    Wound 24 Toe (Comment  which one) Left;Anterior \"blister\" per pt scabbed over, inside lateral great toe (Active)   Number of days: 281       Wound 24 Toe (Comment  which one) Right R 3rd toe (Active)   Number of days: 278       Wound 25

## 2025-05-05 NOTE — ASSESSMENT & PLAN NOTE
Assessment  Right 3rd toe wound is smaller today with less periwound callus, negative probe to bone  Has completed abx and HBOT  Has not been wearing Defender boot on right; has been wearing an orthotic sandal and a crest pad  Plan  Excisional debridement to remove devitalized tissue and promote wound healing   Continue Vashe, Xeroform gauze, Defender boot right foot; change 3x weekly  Once new inserts are picked up and patient can wear custom shoes/inserts on left, will consider TCC on right  RTC 1 week  
30-Oct-2022 19:39
94

## 2025-05-05 NOTE — WOUND CARE
Discharge Instructions for  [facility]  [address] [place] [address]  [address]  [place] [address]  Phone [phone]   Fax [phone]      NAME:  Jacuqeline Tay  YOB: 1950  MEDICAL RECORD NUMBER:  [id number]  DATE:  5/5/2025    Return Appointment:   1 week with Orquidea Cabral NP      Instructions:   Right 3rd Toe:   Cleanse with normal saline  Allow Vashe Wound Solution moistened gauze to sit on wound bed for 60 seconds  Apply Xeroform to wound bed  Secure with rolled gauze and tape  Change daily    CREST PAD to left foot and right foot to offload healed toe wounds      Left great toe healed at this time, but the goal is to keep area soft with Vaseline only  Wear tubigrip from knees to toes on bilateral legs.       Defender boot right foot ; darco with PEG insert to left foot.      Elevate legs when sitting.  Avoid prolonged standing or sitting with legs in dependent position.  Increase protein intake to promote wound healing. Brandon and Ensure are examples of protein supplements.  Wound healing is greatly slowed when glucose levels are greater than 200. Monitor glucose levels to ensure tight glucose control.     Protein:   Egg ~ 6g  Yogurt ~ 15g  Half cup of cottage cheese ~ 15g  Chicken breast ~ 30g    Should you experience increased redness, swelling, pain, foul odor, size of wound(s), or have a temperature over 101 degrees please contact the Wound Healing Center at [phone] or if after hours contact your primary care physician or go to the hospital emergency department.    PLEASE NOTE: IF YOU ARE UNABLE TO OBTAIN WOUND SUPPLIES, CONTINUE TO USE THE SUPPLIES YOU HAVE AVAILABLE UNTIL YOU ARE ABLE TO REACH US. IT IS MOST IMPORTANT TO KEEP THE WOUND COVERED AT ALL TIMES.    Electronically signed Brittanie Maravilla RN, C on 5/5/2025 at 1:33 PM

## 2025-05-05 NOTE — FLOWSHEET NOTE
05/05/25 1308   Right Leg Edema Point of Measurement   Leg circumference 33 cm   Ankle circumference 21 cm   Foot circumference 21 cm   Compression Therapy Tubular elastic support bandage   Left Leg Edema Point of Measurement   Leg circumference 34 cm   Ankle circumference 21 cm   Foot circumference 22 cm   Compression Therapy Tubular elastic support bandage   Wound 01/14/25 Toe (Comment  which one) Right #1 3rd toe   Date First Assessed/Time First Assessed: 01/14/25 1038   Present on Original Admission: Yes  Wound Approximate Age at First Assessment (Weeks): 50 weeks  Primary Wound Type: Diabetic Ulcer  Location: (c) Toe (Comment  which one)  Wound Location [place]...   Wound Image     Wound Etiology Diabetic Mota 3   Dressing Status Old drainage noted   Wound Cleansed Vashe   Dressing/Treatment Xeroform   Offloading for Diabetic Foot Ulcers Other (comment);Offloading boot  (silicone toe pad)   Wound Length (cm) 0.3 cm   Wound Width (cm) 0.3 cm   Wound Depth (cm) 0.1 cm   Wound Surface Area (cm^2) 0.09 cm^2   Change in Wound Size % (l*w) 0   Wound Volume (cm^3) 0.009 cm^3   Wound Healing % 67   Post-Procedure Length (cm) 0.6 cm   Post-Procedure Width (cm) 0.4 cm   Post-Procedure Depth (cm) 0.2 cm   Post-Procedure Surface Area (cm^2) 0.24 cm^2   Post-Procedure Volume (cm^3) 0.048 cm^3   Wound Assessment Pink/red   Drainage Amount Small (< 25%)   Drainage Description Serosanguinous   Odor None   Jennifer-wound Assessment Hyperkeratosis (callous)   Wound Thickness Description not for Pressure Injury Full thickness     Patient is on eliquis daily

## 2025-05-05 NOTE — DISCHARGE INSTRUCTIONS
NAME:  Jacqueline Tay  YOB: 1950  MEDICAL RECORD NUMBER:  [id number]  DATE:  5/5/2025     Return Appointment:   1 week with Orquidea Cabral NP        Instructions:   Right 3rd Toe:   Cleanse with normal saline  Allow Vashe Wound Solution moistened gauze to sit on wound bed for 60 seconds  Apply Xeroform to wound bed  Secure with rolled gauze and tape  Change daily     CREST PAD to left foot and right foot to offload healed toe wounds      Left great toe healed at this time, but the goal is to keep area soft with Vaseline only  Wear tubigrip from knees to toes on bilateral legs.         Defender boot right foot ; darco with PEG insert to left foot.      Elevate legs when sitting.  Avoid prolonged standing or sitting with legs in dependent position.  Increase protein intake to promote wound healing. Brandon and Ensure are examples of protein supplements.  Wound healing is greatly slowed when glucose levels are greater than 200. Monitor glucose levels to ensure tight glucose control.     Protein:   Egg ~ 6g  Yogurt ~ 15g  Half cup of cottage cheese ~ 15g  Chicken breast ~ 30g

## 2025-05-15 ENCOUNTER — HOSPITAL ENCOUNTER (OUTPATIENT)
Dept: WOUND CARE | Age: 75
Discharge: HOME | End: 2025-05-15
Payer: MEDICARE

## 2025-05-15 VITALS
HEART RATE: 56 BPM | OXYGEN SATURATION: 96 % | DIASTOLIC BLOOD PRESSURE: 60 MMHG | WEIGHT: 182 LBS | SYSTOLIC BLOOD PRESSURE: 127 MMHG | HEIGHT: 62 IN | BODY MASS INDEX: 33.49 KG/M2 | TEMPERATURE: 98.5 F | RESPIRATION RATE: 16 BRPM

## 2025-05-15 DIAGNOSIS — L97.512 DIABETIC ULCER OF TOE OF RIGHT FOOT ASSOCIATED WITH TYPE 2 DIABETES MELLITUS, WITH FAT LAYER EXPOSED (HCC): ICD-10-CM

## 2025-05-15 DIAGNOSIS — E11.42 DIABETIC POLYNEUROPATHY ASSOCIATED WITH TYPE 2 DIABETES MELLITUS (HCC): Primary | ICD-10-CM

## 2025-05-15 DIAGNOSIS — E11.621 DIABETIC ULCER OF TOE OF RIGHT FOOT ASSOCIATED WITH TYPE 2 DIABETES MELLITUS, WITH FAT LAYER EXPOSED (HCC): ICD-10-CM

## 2025-05-15 PROCEDURE — 11042 DBRDMT SUBQ TIS 1ST 20SQCM/<: CPT

## 2025-05-15 RX ORDER — BACITRACIN ZINC AND POLYMYXIN B SULFATE 500; 1000 [USP'U]/G; [USP'U]/G
OINTMENT TOPICAL ONCE
OUTPATIENT
Start: 2025-05-15 | End: 2025-05-15

## 2025-05-15 RX ORDER — GENTAMICIN SULFATE 1 MG/G
OINTMENT TOPICAL ONCE
OUTPATIENT
Start: 2025-05-15 | End: 2025-05-15

## 2025-05-15 RX ORDER — MUPIROCIN 20 MG/G
OINTMENT TOPICAL ONCE
OUTPATIENT
Start: 2025-05-15 | End: 2025-05-15

## 2025-05-15 RX ORDER — CLOBETASOL PROPIONATE 0.5 MG/G
OINTMENT TOPICAL ONCE
OUTPATIENT
Start: 2025-05-15 | End: 2025-05-15

## 2025-05-15 RX ORDER — SILVER SULFADIAZINE 10 MG/G
CREAM TOPICAL ONCE
OUTPATIENT
Start: 2025-05-15 | End: 2025-05-15

## 2025-05-15 RX ORDER — TRIAMCINOLONE ACETONIDE 1 MG/G
OINTMENT TOPICAL ONCE
OUTPATIENT
Start: 2025-05-15 | End: 2025-05-15

## 2025-05-15 RX ORDER — GINSENG 100 MG
CAPSULE ORAL ONCE
OUTPATIENT
Start: 2025-05-15 | End: 2025-05-15

## 2025-05-15 RX ORDER — NEOMYCIN/BACITRACIN/POLYMYXINB 3.5-400-5K
OINTMENT (GRAM) TOPICAL ONCE
OUTPATIENT
Start: 2025-05-15 | End: 2025-05-15

## 2025-05-15 RX ORDER — SODIUM CHLOR/HYPOCHLOROUS ACID 0.033 %
SOLUTION, IRRIGATION IRRIGATION ONCE
OUTPATIENT
Start: 2025-05-15 | End: 2025-05-15

## 2025-05-15 RX ORDER — LIDOCAINE HYDROCHLORIDE 20 MG/ML
JELLY TOPICAL PRN
Status: DISCONTINUED | OUTPATIENT
Start: 2025-05-15 | End: 2025-05-16 | Stop reason: HOSPADM

## 2025-05-15 RX ORDER — BETAMETHASONE DIPROPIONATE 0.5 MG/G
CREAM TOPICAL ONCE
OUTPATIENT
Start: 2025-05-15 | End: 2025-05-15

## 2025-05-15 RX ADMIN — LIDOCAINE HYDROCHLORIDE: 20 JELLY TOPICAL at 14:01

## 2025-05-15 NOTE — WOUND CARE
Discharge Instructions for  [facility]  [address]  [address]  [place] [address]  Phone [phone]   Fax [phone]      NAME:  Jacqueline Tay  YOB: 1950  MEDICAL RECORD NUMBER:  [id number]  DATE:  5/15/2025    Return Appointment:   1 week (Monday) with Orquidea Cabral NP      Instructions:     Right 3rd Toe:   Cleanse with normal saline  Allow Vashe Wound Solution moistened gauze to sit on wound bed for 60 seconds  Apply alginate with silver. Cut product to wound size and apply to wound bed.   Cover with ABD  Secure with rolled gauze and tape  Change daily    CREST PAD to left foot and right foot to offload healed toe wounds      Left great toe healed at this time, but the goal is to keep area soft with Vaseline only  Wear tubigrip from knees to toes on bilateral legs.   Defender boot right foot; Darco shoe with PEG insert to left foot.      Elevate legs when sitting.  Avoid prolonged standing or sitting with legs in dependent position.  Increase protein intake to promote wound healing. Brandon and Ensure are examples of protein supplements.  Wound healing is greatly slowed when glucose levels are greater than 200. Monitor glucose levels to ensure tight glucose control.     Protein:   Egg ~ 6g  Yogurt ~ 15g  Half cup of cottage cheese ~ 15g  Chicken breast ~ 30g    Consider use of TCC (Total Contact Cast) to offload right foot at next visit      Should you experience increased redness, swelling, pain, foul odor, size of wound(s), or have a temperature over 101 degrees please contact the Wound Healing Center at [phone] or if after hours contact your primary care physician or go to the hospital emergency department.    PLEASE NOTE: IF YOU ARE UNABLE TO OBTAIN WOUND SUPPLIES, CONTINUE TO USE THE SUPPLIES YOU HAVE AVAILABLE UNTIL YOU ARE ABLE TO REACH US. IT IS MOST IMPORTANT TO KEEP THE WOUND COVERED AT ALL TIMES.    Electronically signed Emily Blanca PT, [facility]

## 2025-05-15 NOTE — DISCHARGE INSTRUCTIONS
Return Appointment:    10 days  (Monday) with Orquidea Cabral NP        Instructions:      Right 3rd Toe:   Cleanse with normal saline  Allow Vashe Wound Solution moistened gauze to sit on wound bed for 60 seconds  Apply alginate with silver. Cut product to wound size and apply to wound bed.   Cover with ABD  Secure with rolled gauze and tape  Change daily     CREST PAD to left foot and right foot to offload healed toe wounds      Left great toe healed at this time, but the goal is to keep area soft with Vaseline only  Wear tubigrip from knees to toes on bilateral legs.   Defender boot right foot; Darco shoe with PEG insert to left foot.      Elevate legs when sitting.  Avoid prolonged standing or sitting with legs in dependent position.  Increase protein intake to promote wound healing. Brandon and Ensure are examples of protein supplements.  Wound healing is greatly slowed when glucose levels are greater than 200. Monitor glucose levels to ensure tight glucose control.     Protein:   Egg ~ 6g  Yogurt ~ 15g  Half cup of cottage cheese ~ 15g  Chicken breast ~ 30g     Consider use of TCC (Total Contact Cast) to offload right foot at next visit

## 2025-05-27 ENCOUNTER — HOSPITAL ENCOUNTER (OUTPATIENT)
Dept: WOUND CARE | Age: 75
Discharge: HOME | End: 2025-05-27
Payer: MEDICARE

## 2025-05-27 VITALS
HEIGHT: 62 IN | BODY MASS INDEX: 32.76 KG/M2 | SYSTOLIC BLOOD PRESSURE: 135 MMHG | HEART RATE: 56 BPM | WEIGHT: 178 LBS | DIASTOLIC BLOOD PRESSURE: 65 MMHG | TEMPERATURE: 97.8 F | RESPIRATION RATE: 16 BRPM

## 2025-05-27 DIAGNOSIS — E11.621 DIABETIC ULCER OF TOE OF RIGHT FOOT ASSOCIATED WITH TYPE 2 DIABETES MELLITUS, WITH FAT LAYER EXPOSED (HCC): ICD-10-CM

## 2025-05-27 DIAGNOSIS — L97.512 DIABETIC ULCER OF TOE OF RIGHT FOOT ASSOCIATED WITH TYPE 2 DIABETES MELLITUS, WITH FAT LAYER EXPOSED (HCC): ICD-10-CM

## 2025-05-27 DIAGNOSIS — E11.42 DIABETIC POLYNEUROPATHY ASSOCIATED WITH TYPE 2 DIABETES MELLITUS (HCC): ICD-10-CM

## 2025-05-27 DIAGNOSIS — M86.10 ACUTE OSTEOMYELITIS (HCC): Primary | ICD-10-CM

## 2025-05-27 PROCEDURE — 11042 DBRDMT SUBQ TIS 1ST 20SQCM/<: CPT

## 2025-05-27 RX ORDER — BETAMETHASONE DIPROPIONATE 0.5 MG/G
CREAM TOPICAL ONCE
OUTPATIENT
Start: 2025-05-27 | End: 2025-05-27

## 2025-05-27 RX ORDER — GINSENG 100 MG
CAPSULE ORAL ONCE
OUTPATIENT
Start: 2025-05-27 | End: 2025-05-27

## 2025-05-27 RX ORDER — BACITRACIN ZINC AND POLYMYXIN B SULFATE 500; 1000 [USP'U]/G; [USP'U]/G
OINTMENT TOPICAL ONCE
OUTPATIENT
Start: 2025-05-27 | End: 2025-05-27

## 2025-05-27 RX ORDER — ZINC GLUCONATE 50 MG
50 TABLET ORAL DAILY
COMMUNITY

## 2025-05-27 RX ORDER — MUPIROCIN 20 MG/G
OINTMENT TOPICAL ONCE
OUTPATIENT
Start: 2025-05-27 | End: 2025-05-27

## 2025-05-27 RX ORDER — CLOBETASOL PROPIONATE 0.5 MG/G
OINTMENT TOPICAL ONCE
OUTPATIENT
Start: 2025-05-27 | End: 2025-05-27

## 2025-05-27 RX ORDER — NEOMYCIN/BACITRACIN/POLYMYXINB 3.5-400-5K
OINTMENT (GRAM) TOPICAL ONCE
OUTPATIENT
Start: 2025-05-27 | End: 2025-05-27

## 2025-05-27 RX ORDER — TRIAMCINOLONE ACETONIDE 1 MG/G
OINTMENT TOPICAL ONCE
OUTPATIENT
Start: 2025-05-27 | End: 2025-05-27

## 2025-05-27 RX ORDER — ASCORBIC ACID 500 MG
500 TABLET ORAL DAILY
COMMUNITY

## 2025-05-27 RX ORDER — MAGNESIUM 30 MG
30 TABLET ORAL DAILY
COMMUNITY

## 2025-05-27 RX ORDER — SILVER SULFADIAZINE 10 MG/G
CREAM TOPICAL ONCE
OUTPATIENT
Start: 2025-05-27 | End: 2025-05-27

## 2025-05-27 RX ORDER — GENTAMICIN SULFATE 1 MG/G
OINTMENT TOPICAL ONCE
OUTPATIENT
Start: 2025-05-27 | End: 2025-05-27

## 2025-05-27 RX ORDER — SODIUM CHLOR/HYPOCHLOROUS ACID 0.033 %
SOLUTION, IRRIGATION IRRIGATION ONCE
OUTPATIENT
Start: 2025-05-27 | End: 2025-05-27

## 2025-05-27 NOTE — FLOWSHEET NOTE
05/27/25 1122   Wound 01/14/25 Toe (Comment  which one) Right #1 3rd toe   Date First Assessed/Time First Assessed: 01/14/25 1038   Present on Original Admission: Yes  Wound Approximate Age at First Assessment (Weeks): 50 weeks  Primary Wound Type: Diabetic Ulcer  Location: (c) Toe (Comment  which one)  Wound Location [place]...   Wound Image     Wound Etiology Diabetic Mota 3   Dressing Status Dry   Wound Cleansed Vashe   Dressing/Treatment Alginate   Offloading for Diabetic Foot Ulcers Offloading boot   Wound Length (cm) 0.5 cm   Wound Width (cm) 0.7 cm   Wound Depth (cm) 0.1 cm   Wound Surface Area (cm^2) 0.35 cm^2   Change in Wound Size % (l*w) -288.89   Wound Volume (cm^3) 0.035 cm^3   Wound Healing % -30   Post-Procedure Length (cm) 0.5 cm   Post-Procedure Width (cm) 0.6 cm   Post-Procedure Depth (cm) 0.2 cm   Post-Procedure Surface Area (cm^2) 0.3 cm^2   Post-Procedure Volume (cm^3) 0.06 cm^3   Wound Assessment Eschar dry   Drainage Amount None (dry)   Odor None   Wound Thickness Description not for Pressure Injury Full thickness     Patient is on eliquis daily

## 2025-05-27 NOTE — WOUND CARE
10 daysDischarge Instructions for  [facility]  [address]  [address]  [place] [address]  Phone [phone]   Fax [phone]      NAME:  Jacqueline Tay  YOB: 1950  MEDICAL RECORD NUMBER:  [id number]  DATE:  5/27/2025    Return Appointment:  Friday for cast application with Orquidea Cabral NP  Monday for cast change    Instructions:     Right 3rd Toe:   Cleanse with normal saline  Allow Vashe Wound Solution moistened gauze to sit on wound bed for 60 seconds  Apply collagen with silver. Cut product to wound size and apply to wound bed.   Cover with xeroform  Secure with rolled gauze and tape  Change daily    CREST PAD to left foot and right foot to offload healed toe wounds      Left great toe healed at this time, but the goal is to keep area soft with Vaseline only  Wear tubigrip from knees to toes on bilateral legs.   Defender boot right foot; Darco shoe with PEG insert to left foot.      Elevate legs when sitting.  Avoid prolonged standing or sitting with legs in dependent position.  Increase protein intake to promote wound healing. Brandon and Ensure are examples of protein supplements.  Wound healing is greatly slowed when glucose levels are greater than 200. Monitor glucose levels to ensure tight glucose control.     Protein:   Egg ~ 6g  Yogurt ~ 15g  Half cup of cottage cheese ~ 15g  Chicken breast ~ 30g    TCC to be placed on Friday.       Should you experience increased redness, swelling, pain, foul odor, size of wound(s), or have a temperature over 101 degrees please contact the Wound Healing Center at [phone] or if after hours contact your primary care physician or go to the hospital emergency department.    PLEASE NOTE: IF YOU ARE UNABLE TO OBTAIN WOUND SUPPLIES, CONTINUE TO USE THE SUPPLIES YOU HAVE AVAILABLE UNTIL YOU ARE ABLE TO REACH US. IT IS MOST IMPORTANT TO KEEP THE WOUND COVERED AT ALL TIMES.    Electronically signed Brittanie Maravilla RN, WCC on

## 2025-05-27 NOTE — PROGRESS NOTES
[facility]  Procedure Note    Jacqueline Tay  MEDICAL RECORD NUMBER: [id number]  AGE: 74 y.o.     GENDER: female    : 1950  EPISODE DATE: 2025    Problem List Items Addressed This Visit          Endocrine    Diabetic ulcer of toe of right foot associated with type 2 diabetes mellitus, with fat layer exposed (HCC) (Chronic)      Assessment  Right 3rd toe wound is smaller today; continues to build up calls, does not probe to bone  Has completed abx and HBOT  Has been offloading with Defender; gets new inserts on Thursday  Plan  Excisional debridement to remove devitalized tissue and promote wound healing   Continue Vashe, collagen Ag, Xeroform gauze, Defender boot right foot; change 3x weekly  Patient to return on Monday for TCC placement         Relevant Orders    Notify physician (specify)    Hyperbaric Oxygen Therapy    Initiate Outpatient Wound Care Protocol    Diabetic polyneuropathy associated with type 2 diabetes mellitus (HCC) (Chronic)    Relevant Orders    Initiate Outpatient Wound Care Protocol       Musculoskeletal and Integument    Acute osteomyelitis (HCC) - Primary (Chronic)    Relevant Orders    Notify physician (specify)    Hyperbaric Oxygen Therapy     Procedure Note: Excisional Debridement  Indications: Based on my examination of the patient's wound(s) today, debridement is required to remove devitalized tissue, evaluate the wound base, and promote wound healing.  Performed by: DORON Patrick NP  Consent obtained: Yes  Time out taken: Yes  Pain control:     Wound #: 1  Diabetic/pressure/chronic non-pressure ulcers only: diabetic ulcer, fat layer exposed was/were debrided using curette. The wound(s) was/were debrided down through/to subcutaneous tissue. Devitalized tissue debrided: biofilm, slough, exudate, and callus. Pre and post debridement measurements: see flow sheet.    Wound 24 Toe (Comment  which one) Left;Anterior

## 2025-05-27 NOTE — ASSESSMENT & PLAN NOTE
Assessment  Right 3rd toe wound is smaller today; continues to build up calls, does not probe to bone  Has completed abx and HBOT  Has been offloading with Defender; gets new inserts on Thursday  Plan  Excisional debridement to remove devitalized tissue and promote wound healing   Continue Vashe, collagen Ag, Xeroform gauze, Defender boot right foot; change 3x weekly  Patient to return on Monday for TCC placement

## 2025-06-02 ENCOUNTER — HOSPITAL ENCOUNTER (OUTPATIENT)
Dept: WOUND CARE | Age: 75
Discharge: HOME | End: 2025-06-02
Payer: MEDICARE

## 2025-06-02 VITALS
RESPIRATION RATE: 16 BRPM | BODY MASS INDEX: 32.01 KG/M2 | DIASTOLIC BLOOD PRESSURE: 58 MMHG | WEIGHT: 175 LBS | SYSTOLIC BLOOD PRESSURE: 111 MMHG | HEART RATE: 53 BPM | OXYGEN SATURATION: 94 % | TEMPERATURE: 98.4 F

## 2025-06-02 DIAGNOSIS — E11.42 DIABETIC POLYNEUROPATHY ASSOCIATED WITH TYPE 2 DIABETES MELLITUS (HCC): ICD-10-CM

## 2025-06-02 DIAGNOSIS — E11.621 DIABETIC ULCER OF TOE OF RIGHT FOOT ASSOCIATED WITH TYPE 2 DIABETES MELLITUS, WITH FAT LAYER EXPOSED (HCC): Primary | ICD-10-CM

## 2025-06-02 DIAGNOSIS — L97.512 DIABETIC ULCER OF TOE OF RIGHT FOOT ASSOCIATED WITH TYPE 2 DIABETES MELLITUS, WITH FAT LAYER EXPOSED (HCC): Primary | ICD-10-CM

## 2025-06-02 PROCEDURE — 11042 DBRDMT SUBQ TIS 1ST 20SQCM/<: CPT

## 2025-06-02 RX ORDER — GINSENG 100 MG
CAPSULE ORAL ONCE
Status: CANCELLED | OUTPATIENT
Start: 2025-06-02 | End: 2025-06-02

## 2025-06-02 RX ORDER — MUPIROCIN 20 MG/G
OINTMENT TOPICAL ONCE
Status: CANCELLED | OUTPATIENT
Start: 2025-06-02 | End: 2025-06-02

## 2025-06-02 RX ORDER — GENTAMICIN SULFATE 1 MG/G
OINTMENT TOPICAL ONCE
Status: CANCELLED | OUTPATIENT
Start: 2025-06-02 | End: 2025-06-02

## 2025-06-02 RX ORDER — NEOMYCIN/BACITRACIN/POLYMYXINB 3.5-400-5K
OINTMENT (GRAM) TOPICAL ONCE
Status: CANCELLED | OUTPATIENT
Start: 2025-06-02 | End: 2025-06-02

## 2025-06-02 RX ORDER — LIDOCAINE HYDROCHLORIDE 20 MG/ML
JELLY TOPICAL PRN
Status: DISCONTINUED | OUTPATIENT
Start: 2025-06-02 | End: 2025-06-03 | Stop reason: HOSPADM

## 2025-06-02 RX ORDER — CLOBETASOL PROPIONATE 0.5 MG/G
OINTMENT TOPICAL ONCE
Status: CANCELLED | OUTPATIENT
Start: 2025-06-02 | End: 2025-06-02

## 2025-06-02 RX ORDER — BETAMETHASONE DIPROPIONATE 0.5 MG/G
CREAM TOPICAL ONCE
Status: CANCELLED | OUTPATIENT
Start: 2025-06-02 | End: 2025-06-02

## 2025-06-02 RX ORDER — BACITRACIN ZINC AND POLYMYXIN B SULFATE 500; 1000 [USP'U]/G; [USP'U]/G
OINTMENT TOPICAL ONCE
Status: CANCELLED | OUTPATIENT
Start: 2025-06-02 | End: 2025-06-02

## 2025-06-02 RX ORDER — SILVER SULFADIAZINE 10 MG/G
CREAM TOPICAL ONCE
Status: CANCELLED | OUTPATIENT
Start: 2025-06-02 | End: 2025-06-02

## 2025-06-02 RX ORDER — TRIAMCINOLONE ACETONIDE 1 MG/G
OINTMENT TOPICAL ONCE
Status: CANCELLED | OUTPATIENT
Start: 2025-06-02 | End: 2025-06-02

## 2025-06-02 RX ORDER — SODIUM CHLOR/HYPOCHLOROUS ACID 0.033 %
SOLUTION, IRRIGATION IRRIGATION ONCE
Status: CANCELLED | OUTPATIENT
Start: 2025-06-02 | End: 2025-06-02

## 2025-06-02 RX ADMIN — LIDOCAINE HYDROCHLORIDE: 20 JELLY TOPICAL at 14:28

## 2025-06-02 NOTE — WOUND CARE
Total Contact Cast    NAME:  Jacqueline Tay  YOB: 1950  MEDICAL RECORD NUMBER:  [id number]  DATE:  6/2/2025    Goal:  Patient will maintain integrity of cast, avoid mobility hazards, and report complications that may occur (foul odor, pain, numbness, cracked cast).    Removed old contact cast if indicated and wash extremity with soap and water  Applied ordered dressing over wound(s)   Applied cast padding  Applied per Orquidea Cabral NP  Total Contact Cast was applied in the Wound Care Center to right lower leg  Applied cast material fiberglass  Allow cast to dry   Instructed patient to report to health care provider, including wound care center, any back pain, hip pain, or leg pain, numbness of toes, or any odor coming from the cast.   Instructed patient not to stick any foreign objects down into cast.  Instructed patient to utilize assistive devices(crutches, cane or walker) as ordered.  Instructed patient to continue offloading as directed.     Applied cast per  Guidelines    Electronically signed by Emily Blanca PT, WCC on 6/2/2025 at 3:56 PM

## 2025-06-02 NOTE — PROGRESS NOTES
[facility]  Procedure Note    Jacqueline Tay  MEDICAL RECORD NUMBER: [id number]  AGE: 74 y.o.     GENDER: female    : 1950  EPISODE DATE: 2025    Problem List Items Addressed This Visit          Endocrine    Diabetic ulcer of toe of right foot associated with type 2 diabetes mellitus, with fat layer exposed (HCC) - Primary (Chronic)    Assessment  Right 3rd wound is smaller; negative probe to bone, does not appear acutely infected  Plan  Excisional debridement to remove devitalized tissue and promote wound healing   Continue Vashe cleanse, collagen Ag, Xeroform gauze; will apply TCC today, change in 2-3 days  TCC precautions given  RTC in 2-3 days for TCC change         Diabetic polyneuropathy associated with type 2 diabetes mellitus (HCC) (Chronic)     Procedure Note: Excisional Debridement  Indications: Based on my examination of the patient's wound(s) today, debridement is required to remove devitalized tissue, evaluate the wound base, and promote wound healing.  Performed by: DORON Patrick NP  Consent obtained: Yes  Time out taken: Yes  Pain control:     Wound #: 1  Diabetic/pressure/chronic non-pressure ulcers only: diabetic ulcer, fat layer exposed was/were debrided using curette. The wound(s) was/were debrided down through/to subcutaneous tissue. Devitalized tissue debrided: biofilm, slough, exudate, and callus.  Pre and post debridement measurements: see flow sheet  Total surface area debrided: 0.2 sq cm   Estimated blood loss: minimal amount blood loss  Hemostasis achieved: By pressure  Procedural pain: 0 / 10   Post procedural pain: 0 / 10   Response to treatment: Patient tolerated procedure well with no complaints of pain.    Procedure: Total Contact Cast  Performed by: DORON Patrick NP  Consent obtained: yes  Total contact cast was applied to right lower leg.  Wound dressing(s) and cast padding were applied by wound care

## 2025-06-02 NOTE — WOUND CARE
10 daysDischarge Instructions for  [facility]  [address] [facility]  [address] [place] [address]  Phone [phone]   Fax [phone]      NAME:  Jacqueline Tay  YOB: 1950  MEDICAL RECORD NUMBER:  [id number]  DATE:  6/2/2025    Return Appointment:  1 week with Orquidea Cabral NP  Wednesday and Thursday for cast change    Instructions:     Right 3rd Toe:   Cleanse with normal saline  Allow Vashe Wound Solution moistened gauze to sit on wound bed for 60 seconds  Apply collagen with silver. Cut product to wound size and apply to wound bed.   Cover with xeroform  Secure with ABD and rolled gauze  Apply TCC-EZ 3\"; wear walking boot at all times when standing or walking  Change with next TCC change    CREST PAD to offload healed toe wounds      Left great toe healed at this time, but the goal is to keep area soft with Vaseline only  Wear tubigrip from knees to toes on bilateral legs.   Darco shoe with PEG insert to left foot.      Elevate legs when sitting.  Avoid prolonged standing or sitting with legs in dependent position.  Increase protein intake to promote wound healing. Brandon and Ensure are examples of protein supplements.  Wound healing is greatly slowed when glucose levels are greater than 200. Monitor glucose levels to ensure tight glucose control.     Protein:   Egg ~ 6g  Yogurt ~ 15g  Half cup of cottage cheese ~ 15g  Chicken breast ~ 30g        Should you experience increased redness, swelling, pain, foul odor, size of wound(s), or have a temperature over 101 degrees please contact the Wound Healing Center at [phone] or if after hours contact your primary care physician or go to the hospital emergency department.    PLEASE NOTE: IF YOU ARE UNABLE TO OBTAIN WOUND SUPPLIES, CONTINUE TO USE THE SUPPLIES YOU HAVE AVAILABLE UNTIL YOU ARE ABLE TO REACH US. IT IS MOST IMPORTANT TO KEEP THE WOUND COVERED AT ALL TIMES.    Electronically signed Emily Blanca PT, WCC on

## 2025-06-02 NOTE — ASSESSMENT & PLAN NOTE
Assessment  Right 3rd wound is smaller; negative probe to bone, does not appear acutely infected  Plan  Excisional debridement to remove devitalized tissue and promote wound healing   Continue Vashe cleanse, collagen Ag, Xeroform gauze; will apply TCC today, change in 2-3 days  TCC precautions given  RTC in 2-3 days for TCC change

## 2025-06-02 NOTE — FLOWSHEET NOTE
06/02/25 1337   Wound 01/14/25 Toe (Comment  which one) Right #1 3rd toe   Date First Assessed/Time First Assessed: 01/14/25 1038   Present on Original Admission: Yes  Wound Approximate Age at First Assessment (Weeks): 50 weeks  Primary Wound Type: Diabetic Ulcer  Location: (c) Toe (Comment  which one)  Wound Location [place]...   Wound Image     Wound Etiology Diabetic Mota 2   Dressing Status Dry   Wound Cleansed Cleansed with saline   Dressing/Treatment Collagen with Ag;Xeroform   Offloading for Diabetic Foot Ulcers Offloading boot   Wound Length (cm) 0.1 cm   Wound Width (cm) 0.1 cm   Wound Depth (cm) 0.1 cm   Wound Surface Area (cm^2) 0.01 cm^2   Change in Wound Size % (l*w) 88.89   Wound Volume (cm^3) 0.001 cm^3   Wound Healing % 96   Post-Procedure Length (cm) 0.4 cm   Post-Procedure Width (cm) 0.5 cm   Post-Procedure Depth (cm) 0.1 cm   Post-Procedure Surface Area (cm^2) 0.2 cm^2   Post-Procedure Volume (cm^3) 0.02 cm^3   Wound Assessment Eschar dry   Drainage Amount None (dry)   Odor None   Jennifer-wound Assessment Dry/flaky   Wound Thickness Description not for Pressure Injury Full thickness   Pain Assessment   Pain Assessment None - Denies Pain     Patient is currently taking Eliquis. Pre- and post-debridement

## 2025-06-02 NOTE — DISCHARGE INSTRUCTIONS
Return Appointment:  1 week with Orquidea Cabral NP  Wednesday and Thursday for cast change     Instructions:      Right 3rd Toe:   Cleanse with normal saline  Allow Vashe Wound Solution moistened gauze to sit on wound bed for 60 seconds  Apply collagen with silver. Cut product to wound size and apply to wound bed.   Cover with xeroform  Secure with ABD and rolled gauze  Apply TCC-EZ 3\"; wear walking boot at all times when standing or walking  Change with next TCC change     CREST PAD to offload healed toe wounds      Left great toe healed at this time, but the goal is to keep area soft with Vaseline only  Wear tubigrip from knees to toes on bilateral legs.   Darco shoe with PEG insert to left foot.      Elevate legs when sitting.  Avoid prolonged standing or sitting with legs in dependent position.  Increase protein intake to promote wound healing. Brandon and Ensure are examples of protein supplements.  Wound healing is greatly slowed when glucose levels are greater than 200. Monitor glucose levels to ensure tight glucose control.     Protein:   Egg ~ 6g  Yogurt ~ 15g  Half cup of cottage cheese ~ 15g  Chicken breast ~ 30g

## 2025-06-04 ENCOUNTER — HOSPITAL ENCOUNTER (OUTPATIENT)
Dept: WOUND CARE | Age: 75
Discharge: HOME | End: 2025-06-04
Payer: MEDICARE

## 2025-06-04 VITALS
SYSTOLIC BLOOD PRESSURE: 129 MMHG | OXYGEN SATURATION: 92 % | WEIGHT: 180 LBS | TEMPERATURE: 97.6 F | HEART RATE: 60 BPM | RESPIRATION RATE: 18 BRPM | BODY MASS INDEX: 32.92 KG/M2 | DIASTOLIC BLOOD PRESSURE: 72 MMHG

## 2025-06-04 DIAGNOSIS — L97.512 DIABETIC ULCER OF TOE OF RIGHT FOOT ASSOCIATED WITH TYPE 2 DIABETES MELLITUS, WITH FAT LAYER EXPOSED (HCC): Primary | Chronic | ICD-10-CM

## 2025-06-04 DIAGNOSIS — E11.621 DIABETIC ULCER OF TOE OF RIGHT FOOT ASSOCIATED WITH TYPE 2 DIABETES MELLITUS, WITH FAT LAYER EXPOSED (HCC): Primary | Chronic | ICD-10-CM

## 2025-06-04 DIAGNOSIS — E11.42 DIABETIC POLYNEUROPATHY ASSOCIATED WITH TYPE 2 DIABETES MELLITUS (HCC): Chronic | ICD-10-CM

## 2025-06-04 PROCEDURE — 29445 APPL RIGID TOT CNTC LEG CAST: CPT

## 2025-06-04 RX ORDER — BACITRACIN ZINC AND POLYMYXIN B SULFATE 500; 1000 [USP'U]/G; [USP'U]/G
OINTMENT TOPICAL ONCE
OUTPATIENT
Start: 2025-06-04 | End: 2025-06-04

## 2025-06-04 RX ORDER — GENTAMICIN SULFATE 1 MG/G
OINTMENT TOPICAL ONCE
OUTPATIENT
Start: 2025-06-04 | End: 2025-06-04

## 2025-06-04 RX ORDER — NEOMYCIN/BACITRACIN/POLYMYXINB 3.5-400-5K
OINTMENT (GRAM) TOPICAL ONCE
OUTPATIENT
Start: 2025-06-04 | End: 2025-06-04

## 2025-06-04 RX ORDER — CLOBETASOL PROPIONATE 0.5 MG/G
OINTMENT TOPICAL ONCE
OUTPATIENT
Start: 2025-06-04 | End: 2025-06-04

## 2025-06-04 RX ORDER — TRIAMCINOLONE ACETONIDE 1 MG/G
OINTMENT TOPICAL ONCE
OUTPATIENT
Start: 2025-06-04 | End: 2025-06-04

## 2025-06-04 RX ORDER — SODIUM CHLOR/HYPOCHLOROUS ACID 0.033 %
SOLUTION, IRRIGATION IRRIGATION ONCE
OUTPATIENT
Start: 2025-06-04 | End: 2025-06-04

## 2025-06-04 RX ORDER — BETAMETHASONE DIPROPIONATE 0.5 MG/G
CREAM TOPICAL ONCE
OUTPATIENT
Start: 2025-06-04 | End: 2025-06-04

## 2025-06-04 RX ORDER — SILVER SULFADIAZINE 10 MG/G
CREAM TOPICAL ONCE
OUTPATIENT
Start: 2025-06-04 | End: 2025-06-04

## 2025-06-04 RX ORDER — GINSENG 100 MG
CAPSULE ORAL ONCE
OUTPATIENT
Start: 2025-06-04 | End: 2025-06-04

## 2025-06-04 RX ORDER — MUPIROCIN 20 MG/G
OINTMENT TOPICAL ONCE
OUTPATIENT
Start: 2025-06-04 | End: 2025-06-04

## 2025-06-04 NOTE — WOUND CARE
Total Contact Cast    NAME:  Jacqueline Tay  YOB: 1950  MEDICAL RECORD NUMBER:  [id number]  DATE:  6/4/2025    Goal:  Patient will maintain integrity of cast, avoid mobility hazards, and report complications that may occur (foul odor, pain, numbness, cracked cast).    Removed old contact cast if indicated and wash extremity with soap and water.  Applied ordered dressing.  Applied foam padding  Applied cast padding included in the kit   Applied per nursing and Orquidea Cabral NP  Applied to: [] Left Lower Leg [x] Right Lower Leg  Allow cast to dry.   Instructed patient to report to health care provider, including wound care center, any back pain, hip pain, or leg pain, numbness of toes, or any odor coming from the cast.   Instructed patient not to stick any foreign objects down into cast.   Instructed patient to utilize assistive devices(crutches, cane or walker) as ordered   Instructed patient to continue offloading as directed.     Applied cast per  Guidelines    Electronically signed by Kd Sinclair RN on 6/4/2025 at 2:35 PM

## 2025-06-04 NOTE — PROGRESS NOTES
[facility]  Procedure Note    Jacqueline Tay  MEDICAL RECORD NUMBER: [id number]  AGE: 74 y.o.     GENDER: female    : 1950  EPISODE DATE: 2025    Problem List Items Addressed This Visit          Endocrine    Diabetic ulcer of toe of right foot associated with type 2 diabetes mellitus, with fat layer exposed (HCC) - Primary (Chronic)    Relevant Orders    Initiate Outpatient Wound Care Protocol    Diabetic polyneuropathy associated with type 2 diabetes mellitus (HCC) (Chronic)    Relevant Orders    Initiate Outpatient Wound Care Protocol     Procedure: Total Contact Cast  Performed by: DORON Patrick - TIFFANI  Consent obtained: yes  - Total contact cast was applied to right lower leg.  - Wound dressing(s) and cast padding were applied by wound care nurse.  - I, as the provider, applied the final layer of fiberglass casting material.  - Patient instructed to report any new back, hip, or leg pain, rubbing or discomfort, numbness of toes, or odor coming from the cast.  - Patient instructed to use TCC boot for ambulation. Advised cast may crack and cause injury if boot not used.  - Patient instructed to keep cast clean and dry; instructed to report to clinic or ED immediately for removal if cast becomes wet.  - Instructed patient to use assistive devices (crutches, cane, walker, knee scooter) as ordered.    This dictation may have been generated in part by voice recognition computer software. Although all attempts are made to edit the dictation for accuracy, there may be errors in the transcription that are not intended.

## 2025-06-05 NOTE — WOUND CARE
10 daysDischarge Instructions for  [facility]  [address] [facility]  [address] [place] [address]  Phone [phone]   Fax [phone]      NAME:  Jacqueline Tay  YOB: 1950  MEDICAL RECORD NUMBER:  [id number]  DATE:  6/4/2025    Return Appointment:  1 week with Orquidea Cabral NP    Instructions:     Right 3rd Toe:   Cleanse with normal saline  Allow Vashe Wound Solution moistened gauze to sit on wound bed for 60 seconds  Apply collagen with silver. Cut product to wound size and apply to wound bed.   Cover with xeroform  Secure with ABD and rolled gauze  Apply TCC-EZ 3\"; wear walking boot at all times when standing or walking  Change with next TCC change    CREST PAD to offload healed toe wounds      Left great toe healed at this time, but the goal is to keep area soft with Vaseline only  Wear tubigrip from knees to toes on bilateral legs.   Darco shoe with PEG insert to left foot.      Elevate legs when sitting.  Avoid prolonged standing or sitting with legs in dependent position.  Increase protein intake to promote wound healing. Brandon and Ensure are examples of protein supplements.  Wound healing is greatly slowed when glucose levels are greater than 200. Monitor glucose levels to ensure tight glucose control.     Protein:   Egg ~ 6g  Yogurt ~ 15g  Half cup of cottage cheese ~ 15g  Chicken breast ~ 30g        Should you experience increased redness, swelling, pain, foul odor, size of wound(s), or have a temperature over 101 degrees please contact the Wound Healing Center at [phone] or if after hours contact your primary care physician or go to the hospital emergency department.    PLEASE NOTE: IF YOU ARE UNABLE TO OBTAIN WOUND SUPPLIES, CONTINUE TO USE THE SUPPLIES YOU HAVE AVAILABLE UNTIL YOU ARE ABLE TO REACH US. IT IS MOST IMPORTANT TO KEEP THE WOUND COVERED AT ALL TIMES.    Electronically signed Kd Sinclair RN, C on 6/5/2025 at 4:12 PM

## 2025-06-09 ENCOUNTER — HOSPITAL ENCOUNTER (OUTPATIENT)
Dept: WOUND CARE | Age: 75
Discharge: HOME | End: 2025-06-09
Payer: MEDICARE

## 2025-06-09 VITALS
RESPIRATION RATE: 18 BRPM | TEMPERATURE: 98.2 F | HEART RATE: 76 BPM | DIASTOLIC BLOOD PRESSURE: 70 MMHG | SYSTOLIC BLOOD PRESSURE: 116 MMHG

## 2025-06-09 DIAGNOSIS — E11.42 DIABETIC POLYNEUROPATHY ASSOCIATED WITH TYPE 2 DIABETES MELLITUS (HCC): Chronic | ICD-10-CM

## 2025-06-09 DIAGNOSIS — L97.512 DIABETIC ULCER OF TOE OF RIGHT FOOT ASSOCIATED WITH TYPE 2 DIABETES MELLITUS, WITH FAT LAYER EXPOSED (HCC): Primary | Chronic | ICD-10-CM

## 2025-06-09 DIAGNOSIS — E11.621 DIABETIC ULCER OF TOE OF RIGHT FOOT ASSOCIATED WITH TYPE 2 DIABETES MELLITUS, WITH FAT LAYER EXPOSED (HCC): Primary | Chronic | ICD-10-CM

## 2025-06-09 PROCEDURE — 29445 APPL RIGID TOT CNTC LEG CAST: CPT

## 2025-06-09 RX ORDER — CLOBETASOL PROPIONATE 0.5 MG/G
OINTMENT TOPICAL ONCE
OUTPATIENT
Start: 2025-06-09 | End: 2025-06-09

## 2025-06-09 RX ORDER — GENTAMICIN SULFATE 1 MG/G
OINTMENT TOPICAL ONCE
OUTPATIENT
Start: 2025-06-09 | End: 2025-06-09

## 2025-06-09 RX ORDER — MUPIROCIN 2 %
OINTMENT (GRAM) TOPICAL ONCE
OUTPATIENT
Start: 2025-06-09 | End: 2025-06-09

## 2025-06-09 RX ORDER — SODIUM CHLOR/HYPOCHLOROUS ACID 0.033 %
SOLUTION, IRRIGATION IRRIGATION ONCE
OUTPATIENT
Start: 2025-06-09 | End: 2025-06-09

## 2025-06-09 RX ORDER — SILVER SULFADIAZINE 10 MG/G
CREAM TOPICAL ONCE
OUTPATIENT
Start: 2025-06-09 | End: 2025-06-09

## 2025-06-09 RX ORDER — SODIUM CHLOR/HYPOCHLOROUS ACID 0.033 %
SOLUTION, IRRIGATION IRRIGATION ONCE
Status: COMPLETED | OUTPATIENT
Start: 2025-06-09 | End: 2025-06-09

## 2025-06-09 RX ORDER — BETAMETHASONE DIPROPIONATE 0.5 MG/G
CREAM TOPICAL ONCE
OUTPATIENT
Start: 2025-06-09 | End: 2025-06-09

## 2025-06-09 RX ORDER — NEOMYCIN/BACITRACIN/POLYMYXINB 3.5-400-5K
OINTMENT (GRAM) TOPICAL ONCE
OUTPATIENT
Start: 2025-06-09 | End: 2025-06-09

## 2025-06-09 RX ORDER — TRIAMCINOLONE ACETONIDE 1 MG/G
OINTMENT TOPICAL ONCE
OUTPATIENT
Start: 2025-06-09 | End: 2025-06-09

## 2025-06-09 RX ORDER — GINSENG 100 MG
CAPSULE ORAL ONCE
OUTPATIENT
Start: 2025-06-09 | End: 2025-06-09

## 2025-06-09 RX ORDER — BACITRACIN ZINC AND POLYMYXIN B SULFATE 500; 1000 [USP'U]/G; [USP'U]/G
OINTMENT TOPICAL ONCE
OUTPATIENT
Start: 2025-06-09 | End: 2025-06-09

## 2025-06-09 RX ADMIN — Medication: at 13:49

## 2025-06-09 SDOH — ECONOMIC STABILITY: TRANSPORTATION INSECURITY
IN THE PAST 12 MONTHS, HAS THE LACK OF TRANSPORTATION KEPT YOU FROM MEDICAL APPOINTMENTS OR FROM GETTING MEDICATIONS?: NO

## 2025-06-09 SDOH — ECONOMIC STABILITY: FOOD INSECURITY: WITHIN THE PAST 12 MONTHS, YOU WORRIED THAT YOUR FOOD WOULD RUN OUT BEFORE YOU GOT MONEY TO BUY MORE.: NEVER TRUE

## 2025-06-09 SDOH — ECONOMIC STABILITY: INCOME INSECURITY: IN THE LAST 12 MONTHS, WAS THERE A TIME WHEN YOU WERE NOT ABLE TO PAY THE MORTGAGE OR RENT ON TIME?: NO

## 2025-06-09 SDOH — HEALTH STABILITY: PHYSICAL HEALTH
ON AVERAGE, HOW MANY DAYS PER WEEK DO YOU ENGAGE IN MODERATE TO STRENUOUS EXERCISE (LIKE A BRISK WALK)?: PATIENT DECLINED

## 2025-06-09 SDOH — ECONOMIC STABILITY: FOOD INSECURITY: WITHIN THE PAST 12 MONTHS, THE FOOD YOU BOUGHT JUST DIDN'T LAST AND YOU DIDN'T HAVE MONEY TO GET MORE.: NEVER TRUE

## 2025-06-09 SDOH — ECONOMIC STABILITY: TRANSPORTATION INSECURITY
IN THE PAST 12 MONTHS, HAS LACK OF TRANSPORTATION KEPT YOU FROM MEETINGS, WORK, OR FROM GETTING THINGS NEEDED FOR DAILY LIVING?: NO

## 2025-06-09 ASSESSMENT — LIFESTYLE VARIABLES
HOW OFTEN DO YOU HAVE A DRINK CONTAINING ALCOHOL: 1
HOW OFTEN DO YOU HAVE SIX OR MORE DRINKS ON ONE OCCASION: 1
HOW MANY STANDARD DRINKS CONTAINING ALCOHOL DO YOU HAVE ON A TYPICAL DAY: 0
HOW OFTEN DO YOU HAVE A DRINK CONTAINING ALCOHOL: NEVER
HOW MANY STANDARD DRINKS CONTAINING ALCOHOL DO YOU HAVE ON A TYPICAL DAY: PATIENT DOES NOT DRINK

## 2025-06-09 ASSESSMENT — PATIENT HEALTH QUESTIONNAIRE - PHQ9
SUM OF ALL RESPONSES TO PHQ QUESTIONS 1-9: 0
2. FEELING DOWN, DEPRESSED OR HOPELESS: NOT AT ALL
1. LITTLE INTEREST OR PLEASURE IN DOING THINGS: NOT AT ALL
SUM OF ALL RESPONSES TO PHQ QUESTIONS 1-9: 0

## 2025-06-09 NOTE — WOUND CARE
Total Contact Cast    NAME:  Jacqueline Tay  YOB: 1950  MEDICAL RECORD NUMBER:  [id number]  DATE:  6/9/2025    Goal:  Patient will maintain integrity of cast, avoid mobility hazards, and report complications that may occur (foul odor, pain, numbness, cracked cast).    Removed old contact cast if indicated and wash extremity with soap and water.  Applied ordered dressing.  Applied foam padding  Applied cast padding included in the kit   Applied per nursing and Marti Pfeiffer RN  Applied to: [] Left Lower Leg [x] Right Lower Leg  Allow cast to dry.   Instructed patient to report to health care provider, including wound care center, any back pain, hip pain, or leg pain, numbness of toes, or any odor coming from the cast.   Instructed patient not to stick any foreign objects down into cast.   Instructed patient to utilize assistive devices(crutches, cane or walker) as ordered   Instructed patient to continue offloading as directed.     Applied cast per  Guidelines    Electronically signed by ANGEL LANCE RN on 6/9/2025 at 1:53 PM

## 2025-06-09 NOTE — FLOWSHEET NOTE
06/09/25 1315   Right Leg Edema Point of Measurement   Leg circumference 32 cm   Ankle circumference 21 cm   Foot circumference 22 cm   Compression Therapy Other  (TCC)   Wound 01/14/25 Toe (Comment  which one) Right #1 3rd toe   Date First Assessed/Time First Assessed: 01/14/25 1038   Present on Original Admission: Yes  Wound Approximate Age at First Assessment (Weeks): 50 weeks  Primary Wound Type: Diabetic Ulcer  Location: (c) Toe (Comment  which one)  Wound Location [place]...   Wound Image    Wound Etiology Diabetic Mota 2   Dressing Status Dry   Wound Cleansed Soap and water   Dressing/Treatment Collagen with Ag;Xeroform   Offloading for Diabetic Foot Ulcers Total contact cast   Wound Length (cm) 0 cm  (callous)   Wound Width (cm) 0 cm   Wound Depth (cm) 0 cm   Wound Surface Area (cm^2) 0 cm^2   Change in Wound Size % (l*w) 100   Wound Volume (cm^3) 0 cm^3   Wound Healing % 100   Wound Assessment Dry   Drainage Amount None (dry)   Odor None   Jennifer-wound Assessment Hyperkeratosis (callous)   Margins Attached edges   Wound Thickness Description not for Pressure Injury Full thickness   Pain Assessment   Pain Assessment None - Denies Pain

## 2025-06-09 NOTE — PROGRESS NOTES
3rd toe (Active)   Wound Image   06/09/25 1315   Wound Etiology Diabetic Mota 2 06/09/25 1315   Dressing Status Dry 06/09/25 1315   Wound Cleansed Soap and water 06/09/25 1315   Dressing/Treatment Collagen with Ag;Xeroform 06/09/25 1315   Offloading for Diabetic Foot Ulcers Total contact cast 06/09/25 1315   Wound Length (cm) 0 cm 06/09/25 1315   Wound Width (cm) 0 cm 06/09/25 1315   Wound Depth (cm) 0 cm 06/09/25 1315   Wound Surface Area (cm^2) 0 cm^2 06/09/25 1315   Change in Wound Size % (l*w) 100 06/09/25 1315   Wound Volume (cm^3) 0 cm^3 06/09/25 1315   Wound Healing % 100 06/09/25 1315   Post-Procedure Length (cm) 0.4 cm 06/02/25 1337   Post-Procedure Width (cm) 0.5 cm 06/02/25 1337   Post-Procedure Depth (cm) 0.1 cm 06/02/25 1337   Post-Procedure Surface Area (cm^2) 0.2 cm^2 06/02/25 1337   Post-Procedure Volume (cm^3) 0.02 cm^3 06/02/25 1337   Undermining Starts ___ O'Clock 10 04/07/25 1248   Undermining Ends___ O'Clock 12 04/07/25 1248   Undermining Maxium Distance (cm) 0.2 04/07/25 1248   Wound Assessment Dry 06/09/25 1315   Drainage Amount None (dry) 06/09/25 1315   Drainage Description Serosanguinous 05/15/25 1312   Odor None 06/09/25 1315   Jennifer-wound Assessment Hyperkeratosis (callous) 06/09/25 1315   Margins Attached edges 06/09/25 1315   Wound Thickness Description not for Pressure Injury Full thickness 06/09/25 1315   Number of days: 146      This dictation may have been generated in part by voice recognition computer software. Although all attempts are made to edit the dictation for accuracy, there may be errors in the transcription that are not intended.

## 2025-06-09 NOTE — WOUND CARE
10 daysDischarge Instructions for  [facility]  [address] [place] [address]  [address] [place] [address]  Phone [phone]   Fax [phone]      NAME:  Jacqueline Tay  YOB: 1950  MEDICAL RECORD NUMBER:  [id number]  DATE:  6/9/2025    Return Appointment:  1 week with Orquidea Cabral NP for cast change    Instructions:     Right 3rd Toe:   Cleanse with normal saline  Allow Vashe Wound Solution moistened gauze to sit on wound bed for 60 seconds  Paint with betadine  Apply TCC-EZ 3\"; wear walking boot at all times when standing or walking  Change weekly    CREST PAD to offload healed toe wounds      Left great toe healed at this time, but the goal is to keep area soft with Vaseline only  Wear tubigrip from knees to toes on bilateral legs.   Darco shoe with PEG insert to left foot.      Elevate legs when sitting.  Avoid prolonged standing or sitting with legs in dependent position.  Increase protein intake to promote wound healing. Brandon and Ensure are examples of protein supplements.  Wound healing is greatly slowed when glucose levels are greater than 200. Monitor glucose levels to ensure tight glucose control.     Protein:   Egg ~ 6g  Yogurt ~ 15g  Half cup of cottage cheese ~ 15g  Chicken breast ~ 30g        Should you experience increased redness, swelling, pain, foul odor, size of wound(s), or have a temperature over 101 degrees please contact the Wound Healing Center at [phone] or if after hours contact your primary care physician or go to the hospital emergency department.    PLEASE NOTE: IF YOU ARE UNABLE TO OBTAIN WOUND SUPPLIES, CONTINUE TO USE THE SUPPLIES YOU HAVE AVAILABLE UNTIL YOU ARE ABLE TO REACH US. IT IS MOST IMPORTANT TO KEEP THE WOUND COVERED AT ALL TIMES.    Electronically signed ANGEL LANCE RN, [facility] on 6/9/2025 at 1:51 PM

## 2025-06-09 NOTE — ASSESSMENT & PLAN NOTE
Assessment  Right 3rd wound is callus covered  Plan  No open wound after callus pared  Will continue with TCC for one more week; if wound remains healed we will transition into custom shoes/orthotics  TCC precautions given  RTC 1 week

## 2025-06-10 ENCOUNTER — OFFICE VISIT (OUTPATIENT)
Dept: INTERNAL MEDICINE CLINIC | Facility: CLINIC | Age: 75
End: 2025-06-10
Payer: MEDICARE

## 2025-06-10 VITALS
TEMPERATURE: 97.9 F | DIASTOLIC BLOOD PRESSURE: 59 MMHG | BODY MASS INDEX: 33.13 KG/M2 | HEIGHT: 62 IN | HEART RATE: 60 BPM | SYSTOLIC BLOOD PRESSURE: 133 MMHG | WEIGHT: 180 LBS | RESPIRATION RATE: 16 BRPM

## 2025-06-10 DIAGNOSIS — I10 PRIMARY HYPERTENSION: ICD-10-CM

## 2025-06-10 DIAGNOSIS — E11.40 TYPE 2 DIABETES MELLITUS WITH DIABETIC NEUROPATHY, WITHOUT LONG-TERM CURRENT USE OF INSULIN (HCC): Primary | ICD-10-CM

## 2025-06-10 DIAGNOSIS — E55.9 VITAMIN D DEFICIENCY: ICD-10-CM

## 2025-06-10 DIAGNOSIS — E11.40 TYPE 2 DIABETES MELLITUS WITH DIABETIC NEUROPATHY, WITHOUT LONG-TERM CURRENT USE OF INSULIN (HCC): ICD-10-CM

## 2025-06-10 DIAGNOSIS — R53.82 CHRONIC FATIGUE: ICD-10-CM

## 2025-06-10 DIAGNOSIS — R68.89 OTHER GENERAL SYMPTOMS AND SIGNS: ICD-10-CM

## 2025-06-10 DIAGNOSIS — Z00.00 MEDICARE ANNUAL WELLNESS VISIT, SUBSEQUENT: ICD-10-CM

## 2025-06-10 DIAGNOSIS — I48.91 ATRIAL FIBRILLATION, UNSPECIFIED TYPE (HCC): ICD-10-CM

## 2025-06-10 DIAGNOSIS — I10 ESSENTIAL (PRIMARY) HYPERTENSION: ICD-10-CM

## 2025-06-10 DIAGNOSIS — F33.1 MAJOR DEPRESSIVE DISORDER, RECURRENT, MODERATE (HCC): ICD-10-CM

## 2025-06-10 DIAGNOSIS — E11.42 DIABETIC POLYNEUROPATHY ASSOCIATED WITH TYPE 2 DIABETES MELLITUS (HCC): Chronic | ICD-10-CM

## 2025-06-10 DIAGNOSIS — G47.33 OBSTRUCTIVE SLEEP APNEA: ICD-10-CM

## 2025-06-10 DIAGNOSIS — Z12.11 COLON CANCER SCREENING: ICD-10-CM

## 2025-06-10 DIAGNOSIS — I25.118 CORONARY ARTERY DISEASE OF NATIVE ARTERY OF NATIVE HEART WITH STABLE ANGINA PECTORIS: ICD-10-CM

## 2025-06-10 LAB
25(OH)D3 SERPL-MCNC: 56.2 NG/ML (ref 30–100)
ALBUMIN SERPL-MCNC: 3.3 G/DL (ref 3.2–4.6)
ALBUMIN/GLOB SERPL: 0.9 (ref 1–1.9)
ALP SERPL-CCNC: 62 U/L (ref 35–104)
ALT SERPL-CCNC: 18 U/L (ref 8–45)
ANION GAP SERPL CALC-SCNC: 14 MMOL/L (ref 7–16)
AST SERPL-CCNC: 20 U/L (ref 15–37)
BASOPHILS # BLD: 0.04 K/UL (ref 0–0.2)
BASOPHILS NFR BLD: 0.5 % (ref 0–2)
BILIRUB SERPL-MCNC: 0.5 MG/DL (ref 0–1.2)
BUN SERPL-MCNC: 24 MG/DL (ref 8–23)
CALCIUM SERPL-MCNC: 9 MG/DL (ref 8.8–10.2)
CHLORIDE SERPL-SCNC: 107 MMOL/L (ref 98–107)
CHOLEST SERPL-MCNC: 190 MG/DL (ref 0–200)
CO2 SERPL-SCNC: 22 MMOL/L (ref 20–29)
CREAT SERPL-MCNC: 0.72 MG/DL (ref 0.6–1.1)
CREAT UR-MCNC: 32 MG/DL (ref 28–217)
DIFFERENTIAL METHOD BLD: ABNORMAL
EOSINOPHIL # BLD: 0.07 K/UL (ref 0–0.8)
EOSINOPHIL NFR BLD: 0.9 % (ref 0.5–7.8)
ERYTHROCYTE [DISTWIDTH] IN BLOOD BY AUTOMATED COUNT: 13.7 % (ref 11.9–14.6)
EST. AVERAGE GLUCOSE BLD GHB EST-MCNC: 133 MG/DL
GLOBULIN SER CALC-MCNC: 3.5 G/DL (ref 2.3–3.5)
GLUCOSE SERPL-MCNC: 102 MG/DL (ref 70–99)
HBA1C MFR BLD: 6.3 % (ref 0–5.6)
HCT VFR BLD AUTO: 42 % (ref 35.8–46.3)
HDLC SERPL-MCNC: 50 MG/DL (ref 40–60)
HDLC SERPL: 3.8 (ref 0–5)
HGB BLD-MCNC: 14.7 G/DL (ref 11.7–15.4)
IMM GRANULOCYTES # BLD AUTO: 0.03 K/UL (ref 0–0.5)
IMM GRANULOCYTES NFR BLD AUTO: 0.4 % (ref 0–5)
LDLC SERPL CALC-MCNC: 107 MG/DL (ref 0–100)
LYMPHOCYTES # BLD: 1.98 K/UL (ref 0.5–4.6)
LYMPHOCYTES NFR BLD: 25.5 % (ref 13–44)
MCH RBC QN AUTO: 31.5 PG (ref 26.1–32.9)
MCHC RBC AUTO-ENTMCNC: 35 G/DL (ref 31.4–35)
MCV RBC AUTO: 90.1 FL (ref 82–102)
MICROALBUMIN UR-MCNC: <1.2 MG/DL (ref 0–20)
MICROALBUMIN/CREAT UR-RTO: NORMAL MG/G (ref 0–30)
MONOCYTES # BLD: 0.55 K/UL (ref 0.1–1.3)
MONOCYTES NFR BLD: 7.1 % (ref 4–12)
NEUTS SEG # BLD: 5.09 K/UL (ref 1.7–8.2)
NEUTS SEG NFR BLD: 65.6 % (ref 43–78)
NRBC # BLD: 0 K/UL (ref 0–0.2)
PLATELET # BLD AUTO: 39 K/UL (ref 150–450)
PMV BLD AUTO: 12.1 FL (ref 9.4–12.3)
POTASSIUM SERPL-SCNC: 4 MMOL/L (ref 3.5–5.1)
PROT SERPL-MCNC: 6.7 G/DL (ref 6.3–8.2)
RBC # BLD AUTO: 4.66 M/UL (ref 4.05–5.2)
SODIUM SERPL-SCNC: 142 MMOL/L (ref 136–145)
TRIGL SERPL-MCNC: 167 MG/DL (ref 0–150)
VIT B12 SERPL-MCNC: 743 PG/ML (ref 193–986)
VLDLC SERPL CALC-MCNC: 33 MG/DL (ref 6–23)
WBC # BLD AUTO: 7.8 K/UL (ref 4.3–11.1)

## 2025-06-10 PROCEDURE — 1159F MED LIST DOCD IN RCRD: CPT | Performed by: INTERNAL MEDICINE

## 2025-06-10 PROCEDURE — G8427 DOCREV CUR MEDS BY ELIG CLIN: HCPCS | Performed by: INTERNAL MEDICINE

## 2025-06-10 PROCEDURE — 99214 OFFICE O/P EST MOD 30 MIN: CPT | Performed by: INTERNAL MEDICINE

## 2025-06-10 PROCEDURE — 1123F ACP DISCUSS/DSCN MKR DOCD: CPT | Performed by: INTERNAL MEDICINE

## 2025-06-10 PROCEDURE — 1036F TOBACCO NON-USER: CPT | Performed by: INTERNAL MEDICINE

## 2025-06-10 PROCEDURE — 3075F SYST BP GE 130 - 139MM HG: CPT | Performed by: INTERNAL MEDICINE

## 2025-06-10 PROCEDURE — 1160F RVW MEDS BY RX/DR IN RCRD: CPT | Performed by: INTERNAL MEDICINE

## 2025-06-10 PROCEDURE — 2022F DILAT RTA XM EVC RTNOPTHY: CPT | Performed by: INTERNAL MEDICINE

## 2025-06-10 PROCEDURE — 3078F DIAST BP <80 MM HG: CPT | Performed by: INTERNAL MEDICINE

## 2025-06-10 PROCEDURE — G8399 PT W/DXA RESULTS DOCUMENT: HCPCS | Performed by: INTERNAL MEDICINE

## 2025-06-10 PROCEDURE — 3017F COLORECTAL CA SCREEN DOC REV: CPT | Performed by: INTERNAL MEDICINE

## 2025-06-10 PROCEDURE — G2211 COMPLEX E/M VISIT ADD ON: HCPCS | Performed by: INTERNAL MEDICINE

## 2025-06-10 PROCEDURE — G8417 CALC BMI ABV UP PARAM F/U: HCPCS | Performed by: INTERNAL MEDICINE

## 2025-06-10 PROCEDURE — G0439 PPPS, SUBSEQ VISIT: HCPCS | Performed by: INTERNAL MEDICINE

## 2025-06-10 PROCEDURE — 1090F PRES/ABSN URINE INCON ASSESS: CPT | Performed by: INTERNAL MEDICINE

## 2025-06-10 PROCEDURE — 3046F HEMOGLOBIN A1C LEVEL >9.0%: CPT | Performed by: INTERNAL MEDICINE

## 2025-06-10 PROCEDURE — 1126F AMNT PAIN NOTED NONE PRSNT: CPT | Performed by: INTERNAL MEDICINE

## 2025-06-10 RX ORDER — CITALOPRAM HYDROBROMIDE 20 MG/1
20 TABLET ORAL DAILY
Qty: 90 TABLET | Refills: 3 | Status: SHIPPED | OUTPATIENT
Start: 2025-06-10

## 2025-06-10 RX ORDER — VITAMIN B COMPLEX
1 CAPSULE ORAL DAILY
COMMUNITY

## 2025-06-10 RX ORDER — BENAZEPRIL HYDROCHLORIDE 20 MG/1
20 TABLET ORAL DAILY
Qty: 90 TABLET | Refills: 3 | Status: SHIPPED | OUTPATIENT
Start: 2025-06-10

## 2025-06-10 NOTE — PATIENT INSTRUCTIONS

## 2025-06-10 NOTE — PROGRESS NOTES
6/10/2025 4:44 PM  Location:[facility] PHYSICIAN SERVICES  [facility] [place]NAL MEDICINE  [place]  Patient #:  [id number]  YOB: 1950      History of Present Illness  74-year-old female presents for routine follow-up.    Sleep Apnea Management  Managing sleep apnea with CPAP for several years, improving daytime functioning.  - Duration: Several years.  - Character: Improved daytime functioning.    Cardiac Health  No cardiac issues or chest pain. On Eliquis, no bleeding issues except minor scrapes.    Fasting Blood Glucose  Fasting blood glucose 131 this morning. Active lifestyle.  - Timing: Fasting blood glucose measured this morning.  - Severity: Fasting blood glucose level of 131.    Colonoscopy Concerns  Uncertain about next colonoscopy; last in 2015. Family history of colon cancer in mother.  - Onset: Last colonoscopy in 2015.  - Character: Uncertainty about next colonoscopy.  - Severity: Family history of colon cancer in mother.    Itching and Tingling  Itching and tingling on the back of neck, likely due to CPAP mask.  - Location: Back of neck.  - Character: Itching and tingling.  - Alleviating/Aggravating Factors: Likely due to CPAP mask.    Toe Wound  Wearing boot for slow-healing toe wound. Under Wound Center care with daily hyperbaric oxygen therapy for 6 weeks, excluding weekends. Previously treated with Neosporin by Dr. Chanel, advised against its use beyond one week. Seen at [facility] by Orquieda Cutler NP, and Dr. Sorto. Recommended to continue boot for another week.  - Onset: Slow-healing.  - Location: Toe.  - Duration: Under Wound Center care for 6 weeks, excluding weekends.  - Character: Slow-healing wound.  - Alleviating/Aggravating Factors: Wearing boot, daily hyperbaric oxygen therapy, previously treated with Neosporin.  - Timing: Recommended to continue boot for another week.    FAMILY HISTORY  Her mother had colon cancer, which was contained and did not require

## 2025-06-11 ENCOUNTER — TELEPHONE (OUTPATIENT)
Dept: ONCOLOGY | Age: 75
End: 2025-06-11

## 2025-06-11 ENCOUNTER — RESULTS FOLLOW-UP (OUTPATIENT)
Dept: INTERNAL MEDICINE CLINIC | Facility: CLINIC | Age: 75
End: 2025-06-11

## 2025-06-11 DIAGNOSIS — D69.6 THROMBOCYTOPENIA: Primary | ICD-10-CM

## 2025-06-11 NOTE — TELEPHONE ENCOUNTER
Patient is an established patient with Dr. Wallace. She was last seen 08/12/2024.    Per Dr. Ned Love - schedule follow up with Dr. Wallace for Thrombocytopenia .    Once patient is scheduled please assign referral # [id number]  to appointment. Thank you!

## 2025-06-16 ENCOUNTER — HOSPITAL ENCOUNTER (OUTPATIENT)
Dept: WOUND CARE | Age: 75
Discharge: HOME | End: 2025-06-16
Payer: MEDICARE

## 2025-06-16 DIAGNOSIS — E11.621 DIABETIC ULCER OF TOE OF RIGHT FOOT ASSOCIATED WITH TYPE 2 DIABETES MELLITUS, WITH FAT LAYER EXPOSED (HCC): ICD-10-CM

## 2025-06-16 DIAGNOSIS — L97.512 DIABETIC ULCER OF TOE OF RIGHT FOOT ASSOCIATED WITH TYPE 2 DIABETES MELLITUS, WITH FAT LAYER EXPOSED (HCC): ICD-10-CM

## 2025-06-16 DIAGNOSIS — E11.42 DIABETIC POLYNEUROPATHY ASSOCIATED WITH TYPE 2 DIABETES MELLITUS (HCC): Primary | ICD-10-CM

## 2025-06-16 PROCEDURE — 99212 OFFICE O/P EST SF 10 MIN: CPT

## 2025-06-16 RX ORDER — NEOMYCIN/BACITRACIN/POLYMYXINB 3.5-400-5K
OINTMENT (GRAM) TOPICAL ONCE
Status: CANCELLED | OUTPATIENT
Start: 2025-06-16 | End: 2025-06-16

## 2025-06-16 RX ORDER — TRIAMCINOLONE ACETONIDE 1 MG/G
OINTMENT TOPICAL ONCE
Status: CANCELLED | OUTPATIENT
Start: 2025-06-16 | End: 2025-06-16

## 2025-06-16 RX ORDER — MUPIROCIN 2 %
OINTMENT (GRAM) TOPICAL ONCE
Status: CANCELLED | OUTPATIENT
Start: 2025-06-16 | End: 2025-06-16

## 2025-06-16 RX ORDER — GINSENG 100 MG
CAPSULE ORAL ONCE
Status: CANCELLED | OUTPATIENT
Start: 2025-06-16 | End: 2025-06-16

## 2025-06-16 RX ORDER — BACITRACIN ZINC AND POLYMYXIN B SULFATE 500; 1000 [USP'U]/G; [USP'U]/G
OINTMENT TOPICAL ONCE
Status: CANCELLED | OUTPATIENT
Start: 2025-06-16 | End: 2025-06-16

## 2025-06-16 RX ORDER — SODIUM CHLOR/HYPOCHLOROUS ACID 0.033 %
SOLUTION, IRRIGATION IRRIGATION ONCE
Status: COMPLETED | OUTPATIENT
Start: 2025-06-16 | End: 2025-06-16

## 2025-06-16 RX ORDER — BETAMETHASONE DIPROPIONATE 0.5 MG/G
CREAM TOPICAL ONCE
Status: CANCELLED | OUTPATIENT
Start: 2025-06-16 | End: 2025-06-16

## 2025-06-16 RX ORDER — GENTAMICIN SULFATE 1 MG/G
OINTMENT TOPICAL ONCE
Status: CANCELLED | OUTPATIENT
Start: 2025-06-16 | End: 2025-06-16

## 2025-06-16 RX ORDER — CLOBETASOL PROPIONATE 0.5 MG/G
OINTMENT TOPICAL ONCE
Status: CANCELLED | OUTPATIENT
Start: 2025-06-16 | End: 2025-06-16

## 2025-06-16 RX ORDER — SODIUM CHLOR/HYPOCHLOROUS ACID 0.033 %
SOLUTION, IRRIGATION IRRIGATION ONCE
Status: CANCELLED | OUTPATIENT
Start: 2025-06-16 | End: 2025-06-16

## 2025-06-16 RX ORDER — SILVER SULFADIAZINE 10 MG/G
CREAM TOPICAL ONCE
Status: CANCELLED | OUTPATIENT
Start: 2025-06-16 | End: 2025-06-16

## 2025-06-16 RX ADMIN — Medication: at 13:34

## 2025-06-16 NOTE — WOUND CARE
10 daysDischarge Instructions for  [facility]  [address] [place] [address]  [address] [place] [address]  Phone [phone]   Fax [phone]      NAME:  Jacqueline Tay  YOB: 1950  MEDICAL RECORD NUMBER:  [id number]  DATE:  6/16/2025    Return Appointment:  Discharge with Orquidea Cabral NP for cast change    Instructions:     Right 3rd Toe:   Clean in shower as normal  Apply Vaseline 2 times a day      CREST PAD to offload healed toe wounds      Left great toe healed at this time, but the goal is to keep area soft with Vaseline only  Wear tubigrip from knees to toes on bilateral legs.   Darco shoe with PEG insert to left foot.      Elevate legs when sitting.  Avoid prolonged standing or sitting with legs in dependent position.  Increase protein intake to promote wound healing. Brandon and Ensure are examples of protein supplements.  Wound healing is greatly slowed when glucose levels are greater than 200. Monitor glucose levels to ensure tight glucose control.     Protein:   Egg ~ 6g  Yogurt ~ 15g  Half cup of cottage cheese ~ 15g  Chicken breast ~ 30g      Should you experience increased redness, swelling, pain, foul odor, size of wound(s), or have a temperature over 101 degrees please contact the [facility] at [phone] or if after hours contact your primary care physician or go to the hospital emergency department.    PLEASE NOTE: IF YOU ARE UNABLE TO OBTAIN WOUND SUPPLIES, CONTINUE TO USE THE SUPPLIES YOU HAVE AVAILABLE UNTIL YOU ARE ABLE TO REACH US. IT IS MOST IMPORTANT TO KEEP THE WOUND COVERED AT ALL TIMES.    Electronically signed Josiane Graff RN, [facility] on 6/16/2025 at 1:30 PM

## 2025-06-16 NOTE — FLOWSHEET NOTE
06/16/25 1321   Wound 01/14/25 Toe (Comment  which one) Right #1 3rd toe   Date First Assessed/Time First Assessed: 01/14/25 1038   Present on Original Admission: Yes  Wound Approximate Age at First Assessment (Weeks): 50 weeks  Primary Wound Type: Diabetic Ulcer  Location: (c) Toe (Comment  which one)  Wound Location [place]...   Wound Image    Wound Etiology Diabetic Mota 2   Dressing Status Dry   Wound Cleansed Soap and water   Dressing/Treatment Betadine swabs/povidone iodine   Offloading for Diabetic Foot Ulcers Total contact cast   Wound Length (cm) 0 cm   Wound Width (cm) 0 cm   Wound Depth (cm) 0 cm   Wound Surface Area (cm^2) 0 cm^2   Change in Wound Size % (l*w) 100   Wound Volume (cm^3) 0 cm^3   Wound Healing % 100   Wound Assessment Dry   Drainage Amount None (dry)   Odor None   Jennifer-wound Assessment Hyperkeratosis (callous)   Margins Attached edges   Wound Thickness Description not for Pressure Injury Full thickness     Eliquis daily

## 2025-06-17 DIAGNOSIS — D69.6 THROMBOCYTOPENIA: Primary | ICD-10-CM

## 2025-06-23 ENCOUNTER — TELEPHONE (OUTPATIENT)
Age: 75
End: 2025-06-23

## 2025-06-23 ENCOUNTER — OFFICE VISIT (OUTPATIENT)
Dept: ONCOLOGY | Age: 75
End: 2025-06-23
Payer: MEDICARE

## 2025-06-23 ENCOUNTER — HOSPITAL ENCOUNTER (OUTPATIENT)
Dept: LAB | Age: 75
Discharge: HOME | End: 2025-06-23
Payer: MEDICARE

## 2025-06-23 VITALS
HEIGHT: 62 IN | RESPIRATION RATE: 18 BRPM | OXYGEN SATURATION: 96 % | DIASTOLIC BLOOD PRESSURE: 50 MMHG | TEMPERATURE: 99.1 F | BODY MASS INDEX: 33.03 KG/M2 | WEIGHT: 179.5 LBS | HEART RATE: 47 BPM | SYSTOLIC BLOOD PRESSURE: 94 MMHG

## 2025-06-23 DIAGNOSIS — D69.6 THROMBOCYTOPENIA: ICD-10-CM

## 2025-06-23 DIAGNOSIS — R16.1 SPLENOMEGALY: ICD-10-CM

## 2025-06-23 DIAGNOSIS — D69.6 THROMBOCYTOPENIA: Primary | ICD-10-CM

## 2025-06-23 LAB
ALBUMIN SERPL-MCNC: 3.5 G/DL (ref 3.2–4.6)
ALBUMIN/GLOB SERPL: 1 (ref 1–1.9)
ALP SERPL-CCNC: 66 U/L (ref 35–104)
ALT SERPL-CCNC: 15 U/L (ref 8–45)
ANION GAP SERPL CALC-SCNC: 11 MMOL/L (ref 7–16)
AST SERPL-CCNC: 18 U/L (ref 15–37)
BASOPHILS # BLD: 0.04 K/UL (ref 0–0.2)
BASOPHILS NFR BLD: 0.5 % (ref 0–2)
BILIRUB SERPL-MCNC: 0.6 MG/DL (ref 0–1.2)
BUN SERPL-MCNC: 25 MG/DL (ref 8–23)
CALCIUM SERPL-MCNC: 9.2 MG/DL (ref 8.8–10.2)
CHLORIDE SERPL-SCNC: 105 MMOL/L (ref 98–107)
CO2 SERPL-SCNC: 23 MMOL/L (ref 20–29)
CREAT SERPL-MCNC: 0.67 MG/DL (ref 0.6–1.1)
DIFFERENTIAL METHOD BLD: ABNORMAL
EOSINOPHIL # BLD: 0.09 K/UL (ref 0–0.8)
EOSINOPHIL NFR BLD: 1.1 % (ref 0.5–7.8)
ERYTHROCYTE [DISTWIDTH] IN BLOOD BY AUTOMATED COUNT: 13.5 % (ref 11.9–14.6)
GLOBULIN SER CALC-MCNC: 3.5 G/DL (ref 2.3–3.5)
GLUCOSE SERPL-MCNC: 119 MG/DL (ref 70–99)
HCT VFR BLD AUTO: 47.4 % (ref 35.8–46.3)
HGB BLD-MCNC: 15.9 G/DL (ref 11.7–15.4)
IMM GRANULOCYTES # BLD AUTO: 0.02 K/UL (ref 0–0.5)
IMM GRANULOCYTES NFR BLD AUTO: 0.3 % (ref 0–5)
LYMPHOCYTES # BLD: 1.92 K/UL (ref 0.5–4.6)
LYMPHOCYTES NFR BLD: 24.2 % (ref 13–44)
MCH RBC QN AUTO: 31.2 PG (ref 26.1–32.9)
MCHC RBC AUTO-ENTMCNC: 33.5 G/DL (ref 31.4–35)
MCV RBC AUTO: 93.1 FL (ref 82–102)
MONOCYTES # BLD: 0.59 K/UL (ref 0.1–1.3)
MONOCYTES NFR BLD: 7.4 % (ref 4–12)
NEUTS SEG # BLD: 5.27 K/UL (ref 1.7–8.2)
NEUTS SEG NFR BLD: 66.5 % (ref 43–78)
NRBC # BLD: 0 K/UL (ref 0–0.2)
PLATELET # BLD AUTO: 44 K/UL (ref 150–450)
PMV BLD AUTO: 10.6 FL (ref 9.4–12.3)
POTASSIUM SERPL-SCNC: 4.4 MMOL/L (ref 3.5–5.1)
PROT SERPL-MCNC: 7 G/DL (ref 6.3–8.2)
RBC # BLD AUTO: 5.09 M/UL (ref 4.05–5.2)
SODIUM SERPL-SCNC: 139 MMOL/L (ref 136–145)
WBC # BLD AUTO: 7.9 K/UL (ref 4.3–11.1)

## 2025-06-23 PROCEDURE — 1126F AMNT PAIN NOTED NONE PRSNT: CPT | Performed by: INTERNAL MEDICINE

## 2025-06-23 PROCEDURE — 1036F TOBACCO NON-USER: CPT | Performed by: INTERNAL MEDICINE

## 2025-06-23 PROCEDURE — 1123F ACP DISCUSS/DSCN MKR DOCD: CPT | Performed by: INTERNAL MEDICINE

## 2025-06-23 PROCEDURE — 85025 COMPLETE CBC W/AUTO DIFF WBC: CPT

## 2025-06-23 PROCEDURE — 99214 OFFICE O/P EST MOD 30 MIN: CPT | Performed by: INTERNAL MEDICINE

## 2025-06-23 PROCEDURE — G8417 CALC BMI ABV UP PARAM F/U: HCPCS | Performed by: INTERNAL MEDICINE

## 2025-06-23 PROCEDURE — 1090F PRES/ABSN URINE INCON ASSESS: CPT | Performed by: INTERNAL MEDICINE

## 2025-06-23 PROCEDURE — 36415 COLL VENOUS BLD VENIPUNCTURE: CPT

## 2025-06-23 PROCEDURE — G8399 PT W/DXA RESULTS DOCUMENT: HCPCS | Performed by: INTERNAL MEDICINE

## 2025-06-23 PROCEDURE — G8428 CUR MEDS NOT DOCUMENT: HCPCS | Performed by: INTERNAL MEDICINE

## 2025-06-23 PROCEDURE — 3017F COLORECTAL CA SCREEN DOC REV: CPT | Performed by: INTERNAL MEDICINE

## 2025-06-23 PROCEDURE — 80053 COMPREHEN METABOLIC PANEL: CPT

## 2025-06-23 PROCEDURE — 3074F SYST BP LT 130 MM HG: CPT | Performed by: INTERNAL MEDICINE

## 2025-06-23 PROCEDURE — 3078F DIAST BP <80 MM HG: CPT | Performed by: INTERNAL MEDICINE

## 2025-06-23 ASSESSMENT — PATIENT HEALTH QUESTIONNAIRE - PHQ9
SUM OF ALL RESPONSES TO PHQ QUESTIONS 1-9: 0
2. FEELING DOWN, DEPRESSED OR HOPELESS: NOT AT ALL
SUM OF ALL RESPONSES TO PHQ QUESTIONS 1-9: 0
1. LITTLE INTEREST OR PLEASURE IN DOING THINGS: NOT AT ALL

## 2025-06-23 NOTE — TELEPHONE ENCOUNTER
Tracee w/ Dr Wallace at [facility] calling with a platelet count of 44. They wanted Dr Noel to be aware due to the patient being on blood thinners. Tracee stated they are working up the low blood platets.

## 2025-06-23 NOTE — PROGRESS NOTES
Left message with [facility] letting them know current platelets level below 50k with patient on eliquis per cardiology for a.fib.   
citalopram (CELEXA) 20 MG tablet Take 1 tablet by mouth daily 90 tablet 3    vitamin C (ASCORBIC ACID) 500 MG tablet Take 2 tablets by mouth daily      zinc gluconate 50 MG tablet Take 1 tablet by mouth daily      magnesium 30 MG tablet Take 1 tablet by mouth daily      apixaban (ELIQUIS) 5 MG TABS tablet Take 1 tablet by mouth 2 times daily 180 tablet 3    empagliflozin (JARDIANCE) 25 MG tablet Take 1 tablet by mouth daily 90 tablet 3    Turmeric (QC TUMERIC COMPLEX PO) Take by mouth      potassium chloride (KLOR-CON M20) 20 MEQ extended release tablet Take 1 tablet by mouth daily (Patient taking differently: Take 1 tablet by mouth 2 times daily) 90 tablet 3    isosorbide mononitrate (IMDUR) 30 MG extended release tablet Take 1 tablet by mouth daily 90 tablet 3    dronedarone hcl (MULTAQ) 400 MG TABS Take 1 tablet by mouth 2 times daily (with meals) Take 1 tablet twice daily. 180 tablet 3    CINNAMON PO Take 1,000 mg by mouth Daily      vitamin D3 (CHOLECALCIFEROL) 125 MCG (5000 UT) TABS tablet Take 4,000 Units by mouth daily       No current facility-administered medications for this visit.       Social History     Socioeconomic History    Marital status:      Spouse name: None    Number of children: None    Years of education: None    Highest education level: None   Tobacco Use    Smoking status: Never    Smokeless tobacco: Never   Substance and Sexual Activity    Alcohol use: Never    Drug use: Never     Types: Prescription, OTC    Sexual activity: Yes     Partners: Male     Comment: Spouse only   Social History Narrative    Has PCP Dr Vanegas in [place]  Lives with  at home         Social Drivers of Health     Financial Resource Strain: Low Risk  (8/7/2024)    Overall Financial Resource Strain (CARDIA)     Difficulty of Paying Living Expenses: Not very hard   Food Insecurity: No Food Insecurity (6/9/2025)    Hunger Vital Sign     Worried About Running Out of Food in the Last Year: Never true     Ran

## 2025-06-23 NOTE — PATIENT INSTRUCTIONS
Calcium 06/23/2025 9.2  8.8 - 10.2 MG/DL Final    Total Bilirubin 06/23/2025 0.6  0.0 - 1.2 MG/DL Final    ALT 06/23/2025 15  8 - 45 U/L Final    AST 06/23/2025 18  15 - 37 U/L Final    Alk Phosphatase 06/23/2025 66  35 - 104 U/L Final    Total Protein 06/23/2025 7.0  6.3 - 8.2 g/dL Final    Albumin 06/23/2025 3.5  3.2 - 4.6 g/dL Final    Globulin 06/23/2025 3.5  2.3 - 3.5 g/dL Final    Albumin/Globulin Ratio 06/23/2025 1.0  1.0 - 1.9   Final    WBC 06/23/2025 7.9  4.3 - 11.1 K/uL Final    RBC 06/23/2025 5.09  4.05 - 5.2 M/uL Final    Hemoglobin 06/23/2025 15.9 (H)  11.7 - 15.4 g/dL Final    Hematocrit 06/23/2025 47.4 (H)  35.8 - 46.3 % Final    MCV 06/23/2025 93.1  82.0 - 102.0 FL Final    MCH 06/23/2025 31.2  26.1 - 32.9 PG Final    MCHC 06/23/2025 33.5  31.4 - 35.0 g/dL Final    RDW 06/23/2025 13.5  11.9 - 14.6 % Final    Platelets 06/23/2025 44 (L)  150 - 450 K/uL Final    RESULTS CHECKED X 2    MPV 06/23/2025 10.6  9.4 - 12.3 FL Final    nRBC 06/23/2025 0.00  0.0 - 0.2 K/uL Final    **Note: Absolute NRBC parameter is now reported with Hemogram**    Neutrophils % 06/23/2025 66.5  43.0 - 78.0 % Final    Lymphocytes % 06/23/2025 24.2  13.0 - 44.0 % Final    Monocytes % 06/23/2025 7.4  4.0 - 12.0 % Final    Eosinophils % 06/23/2025 1.1  0.5 - 7.8 % Final    Basophils % 06/23/2025 0.5  0.0 - 2.0 % Final    Immature Granulocytes % 06/23/2025 0.3  0.0 - 5.0 % Final    Neutrophils Absolute 06/23/2025 5.27  1.70 - 8.20 K/UL Final    Lymphocytes Absolute 06/23/2025 1.92  0.50 - 4.60 K/UL Final    Monocytes Absolute 06/23/2025 0.59  0.10 - 1.30 K/UL Final    Eosinophils Absolute 06/23/2025 0.09  0.00 - 0.80 K/UL Final    Basophils Absolute 06/23/2025 0.04  0.00 - 0.20 K/UL Final    Immature Granulocytes Absolute 06/23/2025 0.02  0.00 - 0.50 K/UL Final    Differential Type 06/23/2025 AUTOMATED    Final          Please refer to After Visit Summary or MyChart for upcoming appointment information. Please call our office for

## 2025-06-24 NOTE — TELEPHONE ENCOUNTER
Patient notified of Dr. Noel's recommendation to hold eliquis if platelets are below 50,000. We will recheck platelets in a couple of week day before bone marrow biopsy.

## 2025-06-25 ASSESSMENT — ENCOUNTER SYMPTOMS
VOMITING: 0
NAUSEA: 0

## 2025-06-27 ENCOUNTER — TELEPHONE (OUTPATIENT)
Dept: ONCOLOGY | Age: 75
End: 2025-06-27

## 2025-06-27 NOTE — TELEPHONE ENCOUNTER
Patient needs labs and possible platelet transfusion on 7/7 prior to bone marrow biopsy on 7/8. She will need 1 unit of platelets if plt </= 50k. She is currently scheduled for labs including t&s on 7/7 at 0900 and then come back at 1530 for infusion if needed. LVM to let her know we could also do lab work on 7/5 if she would prefer and then she would not need to come until 1530 on 7/7. Asked her to let us know if she wanted to do it that way so we could rearrange appts.

## 2025-07-01 ENCOUNTER — TELEPHONE (OUTPATIENT)
Dept: INTERVENTIONAL RADIOLOGY/VASCULAR | Age: 75
End: 2025-07-01

## 2025-07-07 ENCOUNTER — HOSPITAL ENCOUNTER (OUTPATIENT)
Dept: INFUSION THERAPY | Age: 75
Setting detail: INFUSION SERIES
Discharge: HOME | End: 2025-07-07
Payer: MEDICARE

## 2025-07-07 DIAGNOSIS — D69.6 THROMBOCYTOPENIA: Primary | ICD-10-CM

## 2025-07-07 DIAGNOSIS — D69.6 THROMBOCYTOPENIA: ICD-10-CM

## 2025-07-07 LAB
ABO + RH BLD: NORMAL
BASOPHILS # BLD: 0.04 K/UL (ref 0–0.2)
BASOPHILS NFR BLD: 0.4 % (ref 0–2)
BLOOD GROUP ANTIBODIES SERPL: NORMAL
DIFFERENTIAL METHOD BLD: ABNORMAL
EOSINOPHIL # BLD: 0.07 K/UL (ref 0–0.8)
EOSINOPHIL NFR BLD: 0.8 % (ref 0.5–7.8)
ERYTHROCYTE [DISTWIDTH] IN BLOOD BY AUTOMATED COUNT: 13.8 % (ref 11.9–14.6)
HCT VFR BLD AUTO: 46 % (ref 35.8–46.3)
HGB BLD-MCNC: 14.9 G/DL (ref 11.7–15.4)
IMM GRANULOCYTES # BLD AUTO: 0.04 K/UL (ref 0–0.5)
IMM GRANULOCYTES NFR BLD AUTO: 0.4 % (ref 0–5)
LYMPHOCYTES # BLD: 2.28 K/UL (ref 0.5–4.6)
LYMPHOCYTES NFR BLD: 24.9 % (ref 13–44)
MCH RBC QN AUTO: 31.3 PG (ref 26.1–32.9)
MCHC RBC AUTO-ENTMCNC: 32.4 G/DL (ref 31.4–35)
MCV RBC AUTO: 96.6 FL (ref 82–102)
MONOCYTES # BLD: 0.65 K/UL (ref 0.1–1.3)
MONOCYTES NFR BLD: 7.1 % (ref 4–12)
NEUTS SEG # BLD: 6.08 K/UL (ref 1.7–8.2)
NEUTS SEG NFR BLD: 66.4 % (ref 43–78)
NRBC # BLD: 0 K/UL (ref 0–0.2)
PLATELET # BLD AUTO: 37 K/UL (ref 150–450)
PMV BLD AUTO: 12.4 FL (ref 9.4–12.3)
RBC # BLD AUTO: 4.76 M/UL (ref 4.05–5.2)
SPECIMEN EXP DATE BLD: NORMAL
WBC # BLD AUTO: 9.2 K/UL (ref 4.3–11.1)

## 2025-07-07 PROCEDURE — 86850 RBC ANTIBODY SCREEN: CPT

## 2025-07-07 PROCEDURE — 86901 BLOOD TYPING SEROLOGIC RH(D): CPT

## 2025-07-07 PROCEDURE — 86900 BLOOD TYPING SEROLOGIC ABO: CPT

## 2025-07-07 PROCEDURE — 36415 COLL VENOUS BLD VENIPUNCTURE: CPT

## 2025-07-07 RX ORDER — SODIUM CHLORIDE 9 MG/ML
20 INJECTION, SOLUTION INTRAVENOUS CONTINUOUS
Status: CANCELLED | OUTPATIENT
Start: 2025-07-08

## 2025-07-07 RX ORDER — SODIUM CHLORIDE 9 MG/ML
25 INJECTION, SOLUTION INTRAVENOUS PRN
Status: CANCELLED | OUTPATIENT
Start: 2025-07-08

## 2025-07-07 RX ORDER — ONDANSETRON 2 MG/ML
8 INJECTION INTRAMUSCULAR; INTRAVENOUS
OUTPATIENT
Start: 2025-07-08

## 2025-07-07 RX ORDER — ALBUTEROL SULFATE 90 UG/1
4 INHALANT RESPIRATORY (INHALATION) PRN
OUTPATIENT
Start: 2025-07-08

## 2025-07-07 RX ORDER — ACETAMINOPHEN 325 MG/1
650 TABLET ORAL
OUTPATIENT
Start: 2025-07-08

## 2025-07-07 RX ORDER — FAMOTIDINE 10 MG/ML
20 INJECTION, SOLUTION INTRAVENOUS
OUTPATIENT
Start: 2025-07-08

## 2025-07-07 RX ORDER — ACETAMINOPHEN 325 MG/1
650 TABLET ORAL ONCE
Status: CANCELLED | OUTPATIENT
Start: 2025-07-08 | End: 2025-07-08

## 2025-07-07 RX ORDER — DIPHENHYDRAMINE HCL 25 MG
25 TABLET ORAL ONCE
Status: CANCELLED | OUTPATIENT
Start: 2025-07-08 | End: 2025-07-08

## 2025-07-07 RX ORDER — EPINEPHRINE 1 MG/ML
0.3 INJECTION, SOLUTION, CONCENTRATE INTRAVENOUS PRN
OUTPATIENT
Start: 2025-07-08

## 2025-07-07 RX ORDER — SODIUM CHLORIDE 9 MG/ML
INJECTION, SOLUTION INTRAVENOUS PRN
OUTPATIENT
Start: 2025-07-08

## 2025-07-07 RX ORDER — HYDROCORTISONE SODIUM SUCCINATE 100 MG/2ML
100 INJECTION INTRAMUSCULAR; INTRAVENOUS
OUTPATIENT
Start: 2025-07-08

## 2025-07-07 RX ORDER — SODIUM CHLORIDE 0.9 % (FLUSH) 0.9 %
5-40 SYRINGE (ML) INJECTION PRN
Status: CANCELLED | OUTPATIENT
Start: 2025-07-08

## 2025-07-07 RX ORDER — DIPHENHYDRAMINE HYDROCHLORIDE 50 MG/ML
50 INJECTION, SOLUTION INTRAMUSCULAR; INTRAVENOUS
OUTPATIENT
Start: 2025-07-08

## 2025-07-08 ENCOUNTER — HOSPITAL ENCOUNTER (OUTPATIENT)
Dept: CT IMAGING | Age: 75
Discharge: HOME | End: 2025-07-10
Attending: INTERNAL MEDICINE
Payer: MEDICARE

## 2025-07-08 ENCOUNTER — HOSPITAL ENCOUNTER (OUTPATIENT)
Dept: INFUSION THERAPY | Age: 75
Setting detail: INFUSION SERIES
Discharge: HOME | End: 2025-07-08
Payer: MEDICARE

## 2025-07-08 VITALS
TEMPERATURE: 97.8 F | RESPIRATION RATE: 14 BRPM | DIASTOLIC BLOOD PRESSURE: 65 MMHG | SYSTOLIC BLOOD PRESSURE: 137 MMHG | HEART RATE: 85 BPM | OXYGEN SATURATION: 93 %

## 2025-07-08 VITALS
SYSTOLIC BLOOD PRESSURE: 146 MMHG | BODY MASS INDEX: 32.74 KG/M2 | HEART RATE: 54 BPM | WEIGHT: 179 LBS | OXYGEN SATURATION: 93 % | RESPIRATION RATE: 22 BRPM | TEMPERATURE: 97.9 F | DIASTOLIC BLOOD PRESSURE: 73 MMHG

## 2025-07-08 DIAGNOSIS — R16.1 SPLENOMEGALY: ICD-10-CM

## 2025-07-08 DIAGNOSIS — D69.6 THROMBOCYTOPENIA: ICD-10-CM

## 2025-07-08 DIAGNOSIS — D69.6 THROMBOCYTOPENIA: Primary | ICD-10-CM

## 2025-07-08 LAB
BASOPHILS # BLD: 0.03 K/UL (ref 0–0.2)
BASOPHILS NFR BLD: 0.4 % (ref 0–2)
BONE MARROW PREP & WRIGHT STAIN: NORMAL
DIFFERENTIAL METHOD BLD: ABNORMAL
EOSINOPHIL # BLD: 0.06 K/UL (ref 0–0.8)
EOSINOPHIL NFR BLD: 0.9 % (ref 0.5–7.8)
ERYTHROCYTE [DISTWIDTH] IN BLOOD BY AUTOMATED COUNT: 13.5 % (ref 11.9–14.6)
HCT VFR BLD AUTO: 43.5 % (ref 35.8–46.3)
HGB BLD-MCNC: 14.7 G/DL (ref 11.7–15.4)
IMM GRANULOCYTES # BLD AUTO: 0.03 K/UL (ref 0–0.5)
IMM GRANULOCYTES NFR BLD AUTO: 0.4 % (ref 0–5)
LYMPHOCYTES # BLD: 1.67 K/UL (ref 0.5–4.6)
LYMPHOCYTES NFR BLD: 23.8 % (ref 13–44)
MCH RBC QN AUTO: 31.9 PG (ref 26.1–32.9)
MCHC RBC AUTO-ENTMCNC: 33.8 G/DL (ref 31.4–35)
MCV RBC AUTO: 94.4 FL (ref 82–102)
MONOCYTES # BLD: 0.43 K/UL (ref 0.1–1.3)
MONOCYTES NFR BLD: 6.1 % (ref 4–12)
NEUTS SEG # BLD: 4.81 K/UL (ref 1.7–8.2)
NEUTS SEG NFR BLD: 68.4 % (ref 43–78)
NRBC # BLD: 0 K/UL (ref 0–0.2)
PLATELET # BLD AUTO: 70 K/UL (ref 150–450)
PMV BLD AUTO: 11.3 FL (ref 9.4–12.3)
RBC # BLD AUTO: 4.61 M/UL (ref 4.05–5.2)
WBC # BLD AUTO: 7 K/UL (ref 4.3–11.1)

## 2025-07-08 PROCEDURE — P9035 PLATELET PHERES LEUKOREDUCED: HCPCS

## 2025-07-08 PROCEDURE — 88313 SPECIAL STAINS GROUP 2: CPT

## 2025-07-08 PROCEDURE — 99152 MOD SED SAME PHYS/QHP 5/>YRS: CPT | Performed by: RADIOLOGY

## 2025-07-08 PROCEDURE — 88305 TISSUE EXAM BY PATHOLOGIST: CPT

## 2025-07-08 PROCEDURE — 85025 COMPLETE CBC W/AUTO DIFF WBC: CPT

## 2025-07-08 PROCEDURE — 6370000000 HC RX 637 (ALT 250 FOR IP): Performed by: INTERNAL MEDICINE

## 2025-07-08 PROCEDURE — 36430 TRANSFUSION BLD/BLD COMPNT: CPT

## 2025-07-08 PROCEDURE — 88341 IMHCHEM/IMCYTCHM EA ADD ANTB: CPT

## 2025-07-08 PROCEDURE — 88311 DECALCIFY TISSUE: CPT

## 2025-07-08 PROCEDURE — 2500000003 HC RX 250 WO HCPCS: Performed by: INTERNAL MEDICINE

## 2025-07-08 PROCEDURE — 2580000003 HC RX 258: Performed by: INTERNAL MEDICINE

## 2025-07-08 PROCEDURE — C1830 POWER BONE MARROW BX NEEDLE: HCPCS

## 2025-07-08 PROCEDURE — 38222 DX BONE MARROW BX & ASPIR: CPT | Performed by: RADIOLOGY

## 2025-07-08 PROCEDURE — 77012 CT SCAN FOR NEEDLE BIOPSY: CPT | Performed by: RADIOLOGY

## 2025-07-08 PROCEDURE — 6360000002 HC RX W HCPCS: Performed by: RADIOLOGY

## 2025-07-08 PROCEDURE — 88342 IMHCHEM/IMCYTCHM 1ST ANTB: CPT

## 2025-07-08 RX ORDER — MIDAZOLAM HYDROCHLORIDE 1 MG/ML
INJECTION, SOLUTION INTRAMUSCULAR; INTRAVENOUS PRN
Status: COMPLETED | OUTPATIENT
Start: 2025-07-08 | End: 2025-07-08

## 2025-07-08 RX ORDER — DIPHENHYDRAMINE HCL 25 MG
25 CAPSULE ORAL ONCE
Status: COMPLETED | OUTPATIENT
Start: 2025-07-08 | End: 2025-07-08

## 2025-07-08 RX ORDER — EPINEPHRINE 1 MG/ML
0.3 INJECTION, SOLUTION, CONCENTRATE INTRAVENOUS PRN
OUTPATIENT
Start: 2025-07-08

## 2025-07-08 RX ORDER — FENTANYL CITRATE 50 UG/ML
INJECTION, SOLUTION INTRAMUSCULAR; INTRAVENOUS PRN
Status: COMPLETED | OUTPATIENT
Start: 2025-07-08 | End: 2025-07-08

## 2025-07-08 RX ORDER — ACETAMINOPHEN 325 MG/1
650 TABLET ORAL ONCE
Status: CANCELLED | OUTPATIENT
Start: 2025-07-08 | End: 2025-07-08

## 2025-07-08 RX ORDER — LIDOCAINE HYDROCHLORIDE 10 MG/ML
INJECTION, SOLUTION EPIDURAL; INFILTRATION; INTRACAUDAL; PERINEURAL PRN
Status: COMPLETED | OUTPATIENT
Start: 2025-07-08 | End: 2025-07-08

## 2025-07-08 RX ORDER — ONDANSETRON 2 MG/ML
8 INJECTION INTRAMUSCULAR; INTRAVENOUS
OUTPATIENT
Start: 2025-07-08

## 2025-07-08 RX ORDER — SODIUM CHLORIDE 9 MG/ML
25 INJECTION, SOLUTION INTRAVENOUS PRN
Status: DISCONTINUED | OUTPATIENT
Start: 2025-07-08 | End: 2025-07-09 | Stop reason: HOSPADM

## 2025-07-08 RX ORDER — SODIUM CHLORIDE 9 MG/ML
20 INJECTION, SOLUTION INTRAVENOUS CONTINUOUS
Status: CANCELLED | OUTPATIENT
Start: 2025-07-08

## 2025-07-08 RX ORDER — HYDROCORTISONE SODIUM SUCCINATE 100 MG/2ML
100 INJECTION INTRAMUSCULAR; INTRAVENOUS
OUTPATIENT
Start: 2025-07-08

## 2025-07-08 RX ORDER — ACETAMINOPHEN 325 MG/1
650 TABLET ORAL ONCE
Status: COMPLETED | OUTPATIENT
Start: 2025-07-08 | End: 2025-07-08

## 2025-07-08 RX ORDER — DIPHENHYDRAMINE HCL 25 MG
25 CAPSULE ORAL ONCE
Status: CANCELLED | OUTPATIENT
Start: 2025-07-08 | End: 2025-07-08

## 2025-07-08 RX ORDER — ACETAMINOPHEN 325 MG/1
650 TABLET ORAL
OUTPATIENT
Start: 2025-07-08

## 2025-07-08 RX ORDER — ALBUTEROL SULFATE 90 UG/1
4 INHALANT RESPIRATORY (INHALATION) PRN
OUTPATIENT
Start: 2025-07-08

## 2025-07-08 RX ORDER — SODIUM CHLORIDE 9 MG/ML
25 INJECTION, SOLUTION INTRAVENOUS PRN
Status: CANCELLED | OUTPATIENT
Start: 2025-07-08

## 2025-07-08 RX ORDER — SODIUM CHLORIDE 0.9 % (FLUSH) 0.9 %
5-40 SYRINGE (ML) INJECTION PRN
Status: CANCELLED | OUTPATIENT
Start: 2025-07-08

## 2025-07-08 RX ORDER — DIPHENHYDRAMINE HYDROCHLORIDE 50 MG/ML
50 INJECTION, SOLUTION INTRAMUSCULAR; INTRAVENOUS
OUTPATIENT
Start: 2025-07-08

## 2025-07-08 RX ORDER — SODIUM CHLORIDE 0.9 % (FLUSH) 0.9 %
5-40 SYRINGE (ML) INJECTION PRN
Status: DISCONTINUED | OUTPATIENT
Start: 2025-07-08 | End: 2025-07-09 | Stop reason: HOSPADM

## 2025-07-08 RX ORDER — SODIUM CHLORIDE 9 MG/ML
INJECTION, SOLUTION INTRAVENOUS PRN
OUTPATIENT
Start: 2025-07-08

## 2025-07-08 RX ORDER — SODIUM CHLORIDE 9 MG/ML
20 INJECTION, SOLUTION INTRAVENOUS CONTINUOUS
Status: DISCONTINUED | OUTPATIENT
Start: 2025-07-08 | End: 2025-07-08 | Stop reason: HOSPADM

## 2025-07-08 RX ADMIN — FENTANYL CITRATE 50 MCG: 50 INJECTION, SOLUTION INTRAMUSCULAR; INTRAVENOUS at 14:39

## 2025-07-08 RX ADMIN — MIDAZOLAM 1 MG: 1 INJECTION INTRAMUSCULAR; INTRAVENOUS at 14:39

## 2025-07-08 RX ADMIN — SODIUM CHLORIDE, PRESERVATIVE FREE 10 ML: 5 INJECTION INTRAVENOUS at 09:40

## 2025-07-08 RX ADMIN — ACETAMINOPHEN 650 MG: 325 TABLET ORAL at 09:25

## 2025-07-08 RX ADMIN — MIDAZOLAM 1 MG: 1 INJECTION INTRAMUSCULAR; INTRAVENOUS at 14:45

## 2025-07-08 RX ADMIN — LIDOCAINE HYDROCHLORIDE 5 ML: 10 INJECTION, SOLUTION EPIDURAL; INFILTRATION; INTRACAUDAL; PERINEURAL at 14:48

## 2025-07-08 RX ADMIN — SODIUM CHLORIDE 25 ML: 0.9 INJECTION, SOLUTION INTRAVENOUS at 09:59

## 2025-07-08 RX ADMIN — DIPHENHYDRAMINE HYDROCHLORIDE 25 MG: 25 CAPSULE ORAL at 09:25

## 2025-07-08 RX ADMIN — FENTANYL CITRATE 50 MCG: 50 INJECTION, SOLUTION INTRAMUSCULAR; INTRAVENOUS at 14:45

## 2025-07-08 ASSESSMENT — PAIN - FUNCTIONAL ASSESSMENT
PAIN_FUNCTIONAL_ASSESSMENT: NONE - DENIES PAIN

## 2025-07-08 NOTE — OP NOTE
[facility]  Department of Interventional Radiology  [phone]        Interventional Radiology Brief Procedure Note    Patient: Jacqueline Tay MRN: [id number]  SSN: [id number]    YOB: 1950  Age: 74 y.o.  Sex: female      Date of Procedure: 7/8/2025    Pre-Procedure Diagnosis: thrombocytopenia    Post-Procedure Diagnosis: SAME    Procedure(s): Image Guided Biopsy    Brief Description of Procedure: bone marrow    Performed By: RONY CUNNINGHAM MD     Assistants: None    Anesthesia:Moderate Sedation    Estimated Blood Loss: Less than 10ml    Specimens:  Pathology    Implants:  None    Findings: left iliac    Complications: None    Recommendations: routine      Follow Up: prn    Signed By: RONY CUNNINGHAM MD     July 8, 2025

## 2025-07-08 NOTE — PRE SEDATION
Sedation Pre-Procedure Note    Patient Name: Jacqueline Tay   YOB: 1950  Room/Bed: Room/bed info not found  Medical Record Number: [id number]  Date: 2025   Time: 2:22 PM       Indication:  thrombocytopenia    Consent: I have discussed with the patient and/or the patient representative the indication, alternatives, and the possible risks and/or complications of the planned procedure and the anesthesia methods. The patient and/or patient representative appear to understand and agree to proceed.    Vital Signs:   Vitals:    25 1313   BP: 129/63   Pulse: 51   Resp: 16   Temp: 97.9 °F (36.6 °C)   SpO2: 91%       Past Medical History:   has a past medical history of Arrhythmia, Arthritis, Atrial fibrillation (HCC), Carpal tunnel syndrome, Cellulitis, Depression, Diabetes (HCC), Diverticulosis of colon (without mention of hemorrhage), Edema extremities, Foot ulcer (HCC), GERD (gastroesophageal reflux disease), Hammer toe, Hypercholesterolemia, Hypertension, Irritable bowel syndrome, Neuropathy, Obese, Obstructive sleep apnea, PUD (peptic ulcer disease), Severe sepsis(995.92), Thromboembolus (HCC), and Type 2 diabetes mellitus without complication ([facility]).    Past Surgical History:   has a past surgical history that includes pr unlisted procedure abdomen peritoneum & omentum (2011); Ovary removal (early );  section (); orthopedic surgery; Tubal ligation; other surgical history (); Appendectomy (); Carpal tunnel release (Right); Cholecystectomy, laparoscopic (); Toe amputation (Right, 2023); eye surgery (2021); and Hand surgery ().    Medications:   Scheduled Meds:   Continuous Infusions:   PRN Meds:   Home Meds:   Prior to Admission medications    Medication Sig Start Date End Date Taking? Authorizing Provider   b complex vitamins capsule Take 1 capsule by mouth daily    Provider, MD Samson   UNABLE TO FIND GLUCOVY  Daily    Provider,

## 2025-07-08 NOTE — PROGRESS NOTES
Pt arrived ambulatory, blood consent obtained, platelets completed, pt tolerated well, ,VSS, 22g PIV left in place per pt request for bone marrow bx appointment today, discharged via wheelchair with spouse

## 2025-07-08 NOTE — DISCHARGE INSTRUCTIONS
If you have any questions about your procedure, please call the Interventional Radiology department at [phone].      After business hours (5pm) and weekends, call the answering service at [phone] and ask for the Interventional Radiologist on call to be paged.

## 2025-07-08 NOTE — H&P
[facility]  Department of Interventional Radiology  [phone]    History and Physical    Patient:  Jacqueline Tay MRN:  [id number]  SSN:  [id number]    YOB: 1950  Age:  74 y.o.  Sex:  female      Primary Care Provider:  Ned Love MD  Referring Physician:  Salud Wallace MD    Subjective:     Chief Complaint: thrombocytopenia    History of the Present Illness:  The patient is a 74 y.o. female who presents with a low platelets needs bm for further evaluation  npo        Past Medical History:   Diagnosis Date    Arrhythmia     episode A.Fib with tachycardia 2014 (on a cruise) Converted back to normal sinus rhythm with Amiodarone    Arthritis     in hands    Atrial fibrillation (HCC) ?    Carpal tunnel syndrome     Cellulitis     history of cellulitis right foot, leg (hosp. 11) and 2nd episode in 2014 (not hospitalized);turned into ulcer on toe     Depression     Diabetes (HCC) dx     type 2, oral med, avg fbs 130-150, notices s/s hypoglycemia at 59 (states has no happened in a long time), last HA1C 6.4    Diverticulosis of colon (without mention of hemorrhage)     Edema extremities     chronic (treated with daily Furosemide)    Foot ulcer (HCC)     GERD (gastroesophageal reflux disease)     resolved per pt    Hammer toe     Hypercholesterolemia     no medication    Hypertension     controlled with meds    Irritable bowel syndrome ?    Neuropathy     in bilateral feet; no medication    Obese 13    BMI 39.1    Obstructive sleep apnea 2023    uses CPAP QHS    PUD (peptic ulcer disease) 1971    Severe sepsis(995.92) 2011    Thromboembolus (HCC)     right leg    Type 2 diabetes mellitus without complication (HCC) 2010     Past Surgical History:   Procedure Laterality Date    APPENDECTOMY  1968    CARPAL TUNNEL RELEASE Right      SECTION  1975    CHOLECYSTECTOMY, LAPAROSCOPIC  1998    EYE SURGERY  2021

## 2025-07-09 LAB
BLD PROD TYP BPU: NORMAL
BLOOD BANK BLOOD PRODUCT EXPIRATION DATE: NORMAL
BLOOD BANK DISPENSE STATUS: NORMAL
BLOOD BANK ISBT PRODUCT BLOOD TYPE: 5100
BLOOD BANK PRODUCT CODE: NORMAL
BLOOD BANK UNIT TYPE AND RH: NORMAL
BPU ID: NORMAL
UNIT DIVISION: 0
UNIT ISSUE DATE/TIME: NORMAL

## 2025-07-15 LAB
FLOW CYTOMETRY RESULTS: NORMAL
SPECIMEN SOURCE: NORMAL
TEST ORDERED: NORMAL

## 2025-07-17 DIAGNOSIS — D69.6 THROMBOCYTOPENIA: Primary | ICD-10-CM

## 2025-07-21 ENCOUNTER — OFFICE VISIT (OUTPATIENT)
Dept: ONCOLOGY | Age: 75
End: 2025-07-21
Payer: MEDICARE

## 2025-07-21 ENCOUNTER — HOSPITAL ENCOUNTER (OUTPATIENT)
Dept: LAB | Age: 75
Discharge: HOME | End: 2025-07-21
Payer: MEDICARE

## 2025-07-21 VITALS
SYSTOLIC BLOOD PRESSURE: 147 MMHG | DIASTOLIC BLOOD PRESSURE: 60 MMHG | HEART RATE: 58 BPM | OXYGEN SATURATION: 98 % | WEIGHT: 183.1 LBS | BODY MASS INDEX: 33.69 KG/M2 | HEIGHT: 62 IN | TEMPERATURE: 98 F | RESPIRATION RATE: 16 BRPM

## 2025-07-21 DIAGNOSIS — D69.6 THROMBOCYTOPENIA: Primary | ICD-10-CM

## 2025-07-21 DIAGNOSIS — R16.1 SPLENOMEGALY: ICD-10-CM

## 2025-07-21 DIAGNOSIS — D69.6 THROMBOCYTOPENIA: ICD-10-CM

## 2025-07-21 LAB
ALBUMIN SERPL-MCNC: 3.4 G/DL (ref 3.2–4.6)
ALBUMIN/GLOB SERPL: 1 (ref 1–1.9)
ALP SERPL-CCNC: 67 U/L (ref 35–104)
ALT SERPL-CCNC: 14 U/L (ref 8–45)
ANION GAP SERPL CALC-SCNC: 13 MMOL/L (ref 7–16)
AST SERPL-CCNC: 16 U/L (ref 15–37)
BASOPHILS # BLD: 0.04 K/UL (ref 0–0.2)
BASOPHILS NFR BLD: 0.5 % (ref 0–2)
BILIRUB SERPL-MCNC: 0.8 MG/DL (ref 0–1.2)
BUN SERPL-MCNC: 24 MG/DL (ref 8–23)
CALCIUM SERPL-MCNC: 9.3 MG/DL (ref 8.8–10.2)
CHLORIDE SERPL-SCNC: 102 MMOL/L (ref 98–107)
CO2 SERPL-SCNC: 24 MMOL/L (ref 20–29)
CREAT SERPL-MCNC: 0.7 MG/DL (ref 0.6–1.1)
DIFFERENTIAL METHOD BLD: ABNORMAL
EOSINOPHIL # BLD: 0.09 K/UL (ref 0–0.8)
EOSINOPHIL NFR BLD: 1.1 % (ref 0.5–7.8)
ERYTHROCYTE [DISTWIDTH] IN BLOOD BY AUTOMATED COUNT: 13.7 % (ref 11.9–14.6)
GLOBULIN SER CALC-MCNC: 3.4 G/DL (ref 2.3–3.5)
GLUCOSE SERPL-MCNC: 109 MG/DL (ref 70–99)
HCT VFR BLD AUTO: 43.9 % (ref 35.8–46.3)
HGB BLD-MCNC: 14.6 G/DL (ref 11.7–15.4)
IMM GRANULOCYTES # BLD AUTO: 0.02 K/UL (ref 0–0.5)
IMM GRANULOCYTES NFR BLD AUTO: 0.3 % (ref 0–5)
LYMPHOCYTES # BLD: 2.06 K/UL (ref 0.5–4.6)
LYMPHOCYTES NFR BLD: 26 % (ref 13–44)
MCH RBC QN AUTO: 31.1 PG (ref 26.1–32.9)
MCHC RBC AUTO-ENTMCNC: 33.3 G/DL (ref 31.4–35)
MCV RBC AUTO: 93.6 FL (ref 82–102)
MONOCYTES # BLD: 0.52 K/UL (ref 0.1–1.3)
MONOCYTES NFR BLD: 6.6 % (ref 4–12)
NEUTS SEG # BLD: 5.18 K/UL (ref 1.7–8.2)
NEUTS SEG NFR BLD: 65.5 % (ref 43–78)
NRBC # BLD: 0 K/UL (ref 0–0.2)
PLATELET # BLD AUTO: 41 K/UL (ref 150–450)
PMV BLD AUTO: 12.2 FL (ref 9.4–12.3)
POTASSIUM SERPL-SCNC: 3.8 MMOL/L (ref 3.5–5.1)
PROT SERPL-MCNC: 6.8 G/DL (ref 6.3–8.2)
RBC # BLD AUTO: 4.69 M/UL (ref 4.05–5.2)
SODIUM SERPL-SCNC: 139 MMOL/L (ref 136–145)
WBC # BLD AUTO: 7.9 K/UL (ref 4.3–11.1)

## 2025-07-21 PROCEDURE — 1036F TOBACCO NON-USER: CPT | Performed by: INTERNAL MEDICINE

## 2025-07-21 PROCEDURE — 1090F PRES/ABSN URINE INCON ASSESS: CPT | Performed by: INTERNAL MEDICINE

## 2025-07-21 PROCEDURE — 99214 OFFICE O/P EST MOD 30 MIN: CPT | Performed by: INTERNAL MEDICINE

## 2025-07-21 PROCEDURE — G8417 CALC BMI ABV UP PARAM F/U: HCPCS | Performed by: INTERNAL MEDICINE

## 2025-07-21 PROCEDURE — 36415 COLL VENOUS BLD VENIPUNCTURE: CPT

## 2025-07-21 PROCEDURE — 3078F DIAST BP <80 MM HG: CPT | Performed by: INTERNAL MEDICINE

## 2025-07-21 PROCEDURE — 80053 COMPREHEN METABOLIC PANEL: CPT

## 2025-07-21 PROCEDURE — G8427 DOCREV CUR MEDS BY ELIG CLIN: HCPCS | Performed by: INTERNAL MEDICINE

## 2025-07-21 PROCEDURE — 3017F COLORECTAL CA SCREEN DOC REV: CPT | Performed by: INTERNAL MEDICINE

## 2025-07-21 PROCEDURE — 1126F AMNT PAIN NOTED NONE PRSNT: CPT | Performed by: INTERNAL MEDICINE

## 2025-07-21 PROCEDURE — 1159F MED LIST DOCD IN RCRD: CPT | Performed by: INTERNAL MEDICINE

## 2025-07-21 PROCEDURE — 1160F RVW MEDS BY RX/DR IN RCRD: CPT | Performed by: INTERNAL MEDICINE

## 2025-07-21 PROCEDURE — 85025 COMPLETE CBC W/AUTO DIFF WBC: CPT

## 2025-07-21 PROCEDURE — 3077F SYST BP >= 140 MM HG: CPT | Performed by: INTERNAL MEDICINE

## 2025-07-21 PROCEDURE — 1123F ACP DISCUSS/DSCN MKR DOCD: CPT | Performed by: INTERNAL MEDICINE

## 2025-07-21 PROCEDURE — G8399 PT W/DXA RESULTS DOCUMENT: HCPCS | Performed by: INTERNAL MEDICINE

## 2025-07-21 ASSESSMENT — PATIENT HEALTH QUESTIONNAIRE - PHQ9
2. FEELING DOWN, DEPRESSED OR HOPELESS: NOT AT ALL
SUM OF ALL RESPONSES TO PHQ QUESTIONS 1-9: 0
1. LITTLE INTEREST OR PLEASURE IN DOING THINGS: NOT AT ALL
SUM OF ALL RESPONSES TO PHQ QUESTIONS 1-9: 0

## 2025-07-21 NOTE — PATIENT INSTRUCTIONS
Patient Information from Today's Visit    The members of your [facility] are listed below:    Physician Provider: Dr. Salud Wallace   Advanced Practice Clinician: Rachel Andrade   Registered Nurse: Sobia BRYANT   Nurse Navigator:   Medical Assistant: Johanny BRYANT   : Rachel \"Abi\" I.   Supportive Care Services: Tammy SCOTTLINK    Diagnosis (Information Sheet Provided on Day of Diagnosis): thrombocytopenia (low platelets)    Follow Up Instructions:   - labs/bone marrow biopsy reviewed  - we want you to stop all of your natural supplements except for vitamin D. Only take what is prescribed for you.   - follow up in one month  - call us with any signs/symptoms of extremely low platelets   - unusual bleeding/bruising   - petechiae rash   - blood blisters in mouth   - go to ER immediately if any uncontrolled bleeding    Has Treatment Plan Been Finalized? N/A     Current Labs:   Hospital Outpatient Visit on 07/21/2025   Component Date Value Ref Range Status    WBC 07/21/2025 7.9  4.3 - 11.1 K/uL Final    RBC 07/21/2025 4.69  4.05 - 5.2 M/uL Final    Hemoglobin 07/21/2025 14.6  11.7 - 15.4 g/dL Final    Hematocrit 07/21/2025 43.9  35.8 - 46.3 % Final    MCV 07/21/2025 93.6  82.0 - 102.0 FL Final    MCH 07/21/2025 31.1  26.1 - 32.9 PG Final    MCHC 07/21/2025 33.3  31.4 - 35.0 g/dL Final    RDW 07/21/2025 13.7  11.9 - 14.6 % Final    Platelets 07/21/2025 41 (L)  150 - 450 K/uL Final    RESULTS CHECKED WITH PREVIOUS RESULTS    MPV 07/21/2025 12.2  9.4 - 12.3 FL Final    nRBC 07/21/2025 0.00  0.0 - 0.2 K/uL Final    **Note: Absolute NRBC parameter is now reported with Hemogram**    Neutrophils % 07/21/2025 65.5  43.0 - 78.0 % Final    Lymphocytes % 07/21/2025 26.0  13.0 - 44.0 % Final    Monocytes % 07/21/2025 6.6  4.0 - 12.0 % Final    Eosinophils % 07/21/2025 1.1  0.5 - 7.8 % Final    Basophils % 07/21/2025 0.5  0.0 - 2.0 % Final    Immature Granulocytes % 07/21/2025 0.3  0.0 - 5.0 % Final    Neutrophils

## 2025-07-21 NOTE — PROGRESS NOTES
Data Source: Patient, [facility] record.    7/21/2025    1:17 PM    Jacqueline Tay [id number]    74 y.o.      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  Per records, patient recently hospitalized with sepsis relating to right ankle and left great toe cellulitis.  Received broad-spectrum antibiotics and blood cultures grew strep.  MRI of left foot was negative for osteomyelitis.  Cardiology consulted and BRAXTON without evidence of valvular vegetation.  ID had recommended cefadroxil as outpatient x 2-week with end of treatment 8/10/2024.  She was also found to have chronic thrombocytopenia dating back to 2022 which ID felt had possibly worsened with antibiotics but hopefully transition to p.o. outpatient regimen will help.  Hematology also consulted.  Peripheral blood smear with true thrombocytopenia but without atypical cells.  Interval History:  7/21/2025: Did not get abdominal MRI, emphasized to do so.  True thrombocytopenia and 40,000 range persists.  Recent bone marrow biopsy without clear evidence of marrow process, flow unremarkable, cytogenetics and myeloid mutation panel unremarkable.  Will await final synoptic report.  In interim, advised to stop all natural supplements including vitamin D, magnesium, cinnamon, turmeric, zinc.  Will check copper and zinc levels today..  We will see her back in a month.    REVIEW OF SYSTEMS:  As mentioned above, all other systems were reviewed in full and are negative.    Past Medical History:   Diagnosis Date    Arrhythmia     episode A.Fib with tachycardia 1/2014 (on a cruise) Converted back to normal sinus rhythm with Amiodarone    Arthritis     in hands    Atrial fibrillation (HCC) 2017?

## 2025-07-29 ENCOUNTER — TELEPHONE (OUTPATIENT)
Dept: ONCOLOGY | Age: 75
End: 2025-07-29

## 2025-07-29 ENCOUNTER — PATIENT MESSAGE (OUTPATIENT)
Age: 75
End: 2025-07-29

## 2025-07-29 DIAGNOSIS — R16.1 SPLENOMEGALY: ICD-10-CM

## 2025-07-29 DIAGNOSIS — D69.6 THROMBOCYTOPENIA: ICD-10-CM

## 2025-08-06 ENCOUNTER — TELEPHONE (OUTPATIENT)
Age: 75
End: 2025-08-06

## 2025-08-15 ENCOUNTER — HOSPITAL ENCOUNTER (OUTPATIENT)
Dept: MRI IMAGING | Age: 75
Discharge: HOME | End: 2025-08-17
Payer: MEDICARE

## 2025-08-15 DIAGNOSIS — R16.1 SPLENOMEGALY: ICD-10-CM

## 2025-08-15 DIAGNOSIS — D69.6 THROMBOCYTOPENIA: ICD-10-CM

## 2025-08-15 PROCEDURE — 6360000004 HC RX CONTRAST MEDICATION: Performed by: INTERNAL MEDICINE

## 2025-08-15 PROCEDURE — 74183 MRI ABD W/O CNTR FLWD CNTR: CPT

## 2025-08-15 PROCEDURE — A9579 GAD-BASE MR CONTRAST NOS,1ML: HCPCS | Performed by: INTERNAL MEDICINE

## 2025-08-15 RX ORDER — GADOTERIDOL 279.3 MG/ML
17 INJECTION INTRAVENOUS
Status: COMPLETED | OUTPATIENT
Start: 2025-08-15 | End: 2025-08-15

## 2025-08-15 RX ADMIN — GADOTERIDOL 17 ML: 279.3 INJECTION, SOLUTION INTRAVENOUS at 11:48

## 2025-08-25 ENCOUNTER — HOSPITAL ENCOUNTER (OUTPATIENT)
Dept: LAB | Age: 75
Discharge: HOME | End: 2025-08-25
Payer: MEDICARE

## 2025-08-25 ENCOUNTER — OFFICE VISIT (OUTPATIENT)
Dept: ONCOLOGY | Age: 75
End: 2025-08-25
Payer: MEDICARE

## 2025-08-25 VITALS
HEART RATE: 57 BPM | HEIGHT: 62 IN | BODY MASS INDEX: 34.61 KG/M2 | WEIGHT: 188.1 LBS | SYSTOLIC BLOOD PRESSURE: 170 MMHG | DIASTOLIC BLOOD PRESSURE: 64 MMHG | RESPIRATION RATE: 18 BRPM | TEMPERATURE: 98.3 F | OXYGEN SATURATION: 96 %

## 2025-08-25 DIAGNOSIS — R16.1 SPLENOMEGALY: ICD-10-CM

## 2025-08-25 DIAGNOSIS — D69.6 THROMBOCYTOPENIA: Primary | ICD-10-CM

## 2025-08-25 DIAGNOSIS — D69.6 THROMBOCYTOPENIA: ICD-10-CM

## 2025-08-25 LAB
ALBUMIN SERPL-MCNC: 3.2 G/DL (ref 3.2–4.6)
ALBUMIN/GLOB SERPL: 0.9 (ref 1–1.9)
ALP SERPL-CCNC: 72 U/L (ref 35–104)
ALT SERPL-CCNC: 14 U/L (ref 8–45)
ANION GAP SERPL CALC-SCNC: 13 MMOL/L (ref 7–16)
AST SERPL-CCNC: 15 U/L (ref 15–37)
BASOPHILS # BLD: 0.02 K/UL (ref 0–0.2)
BASOPHILS NFR BLD: 0.3 % (ref 0–2)
BILIRUB SERPL-MCNC: 0.6 MG/DL (ref 0–1.2)
BUN SERPL-MCNC: 20 MG/DL (ref 8–23)
CALCIUM SERPL-MCNC: 9.1 MG/DL (ref 8.8–10.2)
CHLORIDE SERPL-SCNC: 105 MMOL/L (ref 98–107)
CO2 SERPL-SCNC: 22 MMOL/L (ref 20–29)
CREAT SERPL-MCNC: 0.69 MG/DL (ref 0.6–1.1)
DIFFERENTIAL METHOD BLD: ABNORMAL
EOSINOPHIL # BLD: 0.09 K/UL (ref 0–0.8)
EOSINOPHIL NFR BLD: 1.1 % (ref 0.5–7.8)
ERYTHROCYTE [DISTWIDTH] IN BLOOD BY AUTOMATED COUNT: 13.6 % (ref 11.9–14.6)
GLOBULIN SER CALC-MCNC: 3.4 G/DL (ref 2.3–3.5)
GLUCOSE SERPL-MCNC: 149 MG/DL (ref 70–99)
HCT VFR BLD AUTO: 42.6 % (ref 35.8–46.3)
HGB BLD-MCNC: 14.3 G/DL (ref 11.7–15.4)
IMM GRANULOCYTES # BLD AUTO: 0.02 K/UL (ref 0–0.5)
IMM GRANULOCYTES NFR BLD AUTO: 0.3 % (ref 0–5)
LYMPHOCYTES # BLD: 1.74 K/UL (ref 0.5–4.6)
LYMPHOCYTES NFR BLD: 21.9 % (ref 13–44)
MCH RBC QN AUTO: 31.2 PG (ref 26.1–32.9)
MCHC RBC AUTO-ENTMCNC: 33.6 G/DL (ref 31.4–35)
MCV RBC AUTO: 93 FL (ref 82–102)
MONOCYTES # BLD: 0.51 K/UL (ref 0.1–1.3)
MONOCYTES NFR BLD: 6.4 % (ref 4–12)
NEUTS SEG # BLD: 5.55 K/UL (ref 1.7–8.2)
NEUTS SEG NFR BLD: 70 % (ref 43–78)
NRBC # BLD: 0 K/UL (ref 0–0.2)
PLATELET # BLD AUTO: 37 K/UL (ref 150–450)
PMV BLD AUTO: 12.1 FL (ref 9.4–12.3)
POTASSIUM SERPL-SCNC: 3.8 MMOL/L (ref 3.5–5.1)
PROT SERPL-MCNC: 6.6 G/DL (ref 6.3–8.2)
RBC # BLD AUTO: 4.58 M/UL (ref 4.05–5.2)
SODIUM SERPL-SCNC: 140 MMOL/L (ref 136–145)
WBC # BLD AUTO: 7.9 K/UL (ref 4.3–11.1)

## 2025-08-25 PROCEDURE — 82525 ASSAY OF COPPER: CPT

## 2025-08-25 PROCEDURE — G8427 DOCREV CUR MEDS BY ELIG CLIN: HCPCS | Performed by: INTERNAL MEDICINE

## 2025-08-25 PROCEDURE — 3078F DIAST BP <80 MM HG: CPT | Performed by: INTERNAL MEDICINE

## 2025-08-25 PROCEDURE — 3017F COLORECTAL CA SCREEN DOC REV: CPT | Performed by: INTERNAL MEDICINE

## 2025-08-25 PROCEDURE — 1126F AMNT PAIN NOTED NONE PRSNT: CPT | Performed by: INTERNAL MEDICINE

## 2025-08-25 PROCEDURE — 3077F SYST BP >= 140 MM HG: CPT | Performed by: INTERNAL MEDICINE

## 2025-08-25 PROCEDURE — 80053 COMPREHEN METABOLIC PANEL: CPT

## 2025-08-25 PROCEDURE — 1160F RVW MEDS BY RX/DR IN RCRD: CPT | Performed by: INTERNAL MEDICINE

## 2025-08-25 PROCEDURE — 99214 OFFICE O/P EST MOD 30 MIN: CPT | Performed by: INTERNAL MEDICINE

## 2025-08-25 PROCEDURE — 84630 ASSAY OF ZINC: CPT

## 2025-08-25 PROCEDURE — 36415 COLL VENOUS BLD VENIPUNCTURE: CPT

## 2025-08-25 PROCEDURE — 1123F ACP DISCUSS/DSCN MKR DOCD: CPT | Performed by: INTERNAL MEDICINE

## 2025-08-25 PROCEDURE — G8417 CALC BMI ABV UP PARAM F/U: HCPCS | Performed by: INTERNAL MEDICINE

## 2025-08-25 PROCEDURE — G8399 PT W/DXA RESULTS DOCUMENT: HCPCS | Performed by: INTERNAL MEDICINE

## 2025-08-25 PROCEDURE — 85025 COMPLETE CBC W/AUTO DIFF WBC: CPT

## 2025-08-25 PROCEDURE — 1159F MED LIST DOCD IN RCRD: CPT | Performed by: INTERNAL MEDICINE

## 2025-08-25 PROCEDURE — 1036F TOBACCO NON-USER: CPT | Performed by: INTERNAL MEDICINE

## 2025-08-25 PROCEDURE — 1090F PRES/ABSN URINE INCON ASSESS: CPT | Performed by: INTERNAL MEDICINE

## 2025-08-25 RX ORDER — PREDNISONE 20 MG/1
80 TABLET ORAL EVERY MORNING
Qty: 120 TABLET | Refills: 0 | Status: SHIPPED | OUTPATIENT
Start: 2025-08-25 | End: 2025-09-24

## 2025-08-25 RX ORDER — PANTOPRAZOLE SODIUM 40 MG/1
40 TABLET, DELAYED RELEASE ORAL
Qty: 90 TABLET | Refills: 0 | Status: SHIPPED | OUTPATIENT
Start: 2025-08-25

## 2025-08-27 LAB
COPPER SERPL-MCNC: 97 UG/DL (ref 80–158)
ZINC SERPL-MCNC: 41 UG/DL (ref 44–115)

## 2025-09-03 ENCOUNTER — TELEPHONE (OUTPATIENT)
Dept: ONCOLOGY | Age: 75
End: 2025-09-03

## (undated) DEVICE — GLOVE SURG SZ 65 L12IN FNGR THK79MIL GRN LTX FREE

## (undated) DEVICE — CARDINAL HEALTH FLEXIBLE LIGHT HANDLE COVER: Brand: CARDINAL HEALTH

## (undated) DEVICE — SOLUTION IRRIG 3000ML 0.9% SOD CHL FLX CONT 0797208] ICU MEDICAL INC]

## (undated) DEVICE — PERI-PAD,MODERATE: Brand: CURITY

## (undated) DEVICE — DRESSING PETRO W3XL8IN OIL EMUL N ADH GZ KNIT IMPREG CELOS

## (undated) DEVICE — BANDAGE GZ W2XL75IN ST RAYON POLY CNFRM STRTCH LTWT

## (undated) DEVICE — GLOVE SURG SZ 65 CRM LTX FREE POLYISOPRENE POLYMER BEAD ANTI

## (undated) DEVICE — KENDALL SCD EXPRESS SLEEVES, KNEE LENGTH, MEDIUM: Brand: KENDALL SCD

## (undated) DEVICE — BANDAGE,ELASTIC,ESMARK,STERILE,4"X9',LF: Brand: MEDLINE

## (undated) DEVICE — AMD ANTIMICROBIAL NON-ADHERENT PAD,0.2% POLYHEXAMETHYLENE BIGUANIDE HCI (PHMB): Brand: TELFA

## (undated) DEVICE — GLOVE SURG SZ 8 L12IN FNGR THK79MIL GRN LTX FREE

## (undated) DEVICE — FOOT & ANKLE SOFT DR WOMACK: Brand: MEDLINE INDUSTRIES, INC.

## (undated) DEVICE — BANDAGE COMPR L5YDXW2IN FOAM CO FLX

## (undated) DEVICE — CYSTO: Brand: MEDLINE INDUSTRIES, INC.

## (undated) DEVICE — PAD,ABDOMINAL,5"X9",ST,LF,25/BX: Brand: MEDLINE INDUSTRIES, INC.

## (undated) DEVICE — CONTAINER SPEC FRMLN 120ML --

## (undated) DEVICE — DRAPE TWL SURG 16X26IN BLU ORB04] ALLCARE INC]

## (undated) DEVICE — GOWN,SIRUS,NONRNF,SETINSLV,XL,20/CS: Brand: MEDLINE

## (undated) DEVICE — TRAY PREP DRY W/ PREM GLV 2 APPL 6 SPNG 2 UNDPD 1 OVERWRAP

## (undated) DEVICE — SOLUTION IRRIG 1000ML 0.9% SOD CHL USP POUR PLAS BTL

## (undated) DEVICE — GOWN,REINF,POLY,ECL,PP SLV,XL: Brand: MEDLINE

## (undated) DEVICE — PVC URETHRAL CATHETER: Brand: DOVER

## (undated) DEVICE — DRAPE,UNDERBUTTOCKS,PCH,STERILE: Brand: MEDLINE